# Patient Record
Sex: FEMALE | Race: WHITE | NOT HISPANIC OR LATINO | Employment: OTHER | URBAN - METROPOLITAN AREA
[De-identification: names, ages, dates, MRNs, and addresses within clinical notes are randomized per-mention and may not be internally consistent; named-entity substitution may affect disease eponyms.]

---

## 2020-02-01 ENCOUNTER — HOSPITAL ENCOUNTER (EMERGENCY)
Facility: HOSPITAL | Age: 85
Discharge: HOME | End: 2020-02-01
Attending: EMERGENCY MEDICINE
Payer: COMMERCIAL

## 2020-02-01 ENCOUNTER — APPOINTMENT (EMERGENCY)
Dept: RADIOLOGY | Facility: HOSPITAL | Age: 85
End: 2020-02-01
Payer: COMMERCIAL

## 2020-02-01 VITALS
BODY MASS INDEX: 19.14 KG/M2 | HEART RATE: 81 BPM | RESPIRATION RATE: 24 BRPM | DIASTOLIC BLOOD PRESSURE: 65 MMHG | SYSTOLIC BLOOD PRESSURE: 145 MMHG | WEIGHT: 108 LBS | HEIGHT: 63 IN | OXYGEN SATURATION: 98 %

## 2020-02-01 DIAGNOSIS — R07.89 ATYPICAL CHEST PAIN: Primary | ICD-10-CM

## 2020-02-01 LAB
ALBUMIN SERPL BCP-MCNC: 3.6 G/DL (ref 3.5–5)
ALP SERPL-CCNC: 58 U/L (ref 46–116)
ALT SERPL W P-5'-P-CCNC: 28 U/L (ref 12–78)
ANION GAP SERPL CALCULATED.3IONS-SCNC: 9 MMOL/L (ref 4–13)
AST SERPL W P-5'-P-CCNC: 24 U/L (ref 5–45)
ATRIAL RATE: 78 BPM
BASOPHILS # BLD AUTO: 0.04 THOUSANDS/ΜL (ref 0–0.1)
BASOPHILS NFR BLD AUTO: 1 % (ref 0–1)
BILIRUB SERPL-MCNC: 0.4 MG/DL (ref 0.2–1)
BUN SERPL-MCNC: 31 MG/DL (ref 5–25)
CALCIUM SERPL-MCNC: 8.8 MG/DL (ref 8.3–10.1)
CHLORIDE SERPL-SCNC: 107 MMOL/L (ref 100–108)
CO2 SERPL-SCNC: 27 MMOL/L (ref 21–32)
CREAT SERPL-MCNC: 0.88 MG/DL (ref 0.6–1.3)
EOSINOPHIL # BLD AUTO: 0.08 THOUSAND/ΜL (ref 0–0.61)
EOSINOPHIL NFR BLD AUTO: 1 % (ref 0–6)
ERYTHROCYTE [DISTWIDTH] IN BLOOD BY AUTOMATED COUNT: 12.9 % (ref 11.6–15.1)
GFR SERPL CREATININE-BSD FRML MDRD: 60 ML/MIN/1.73SQ M
GLUCOSE SERPL-MCNC: 105 MG/DL (ref 65–140)
HCT VFR BLD AUTO: 38.7 % (ref 34.8–46.1)
HGB BLD-MCNC: 12.5 G/DL (ref 11.5–15.4)
IMM GRANULOCYTES # BLD AUTO: 0.03 THOUSAND/UL (ref 0–0.2)
IMM GRANULOCYTES NFR BLD AUTO: 0 % (ref 0–2)
LYMPHOCYTES # BLD AUTO: 2.17 THOUSANDS/ΜL (ref 0.6–4.47)
LYMPHOCYTES NFR BLD AUTO: 28 % (ref 14–44)
MCH RBC QN AUTO: 30.9 PG (ref 26.8–34.3)
MCHC RBC AUTO-ENTMCNC: 32.3 G/DL (ref 31.4–37.4)
MCV RBC AUTO: 96 FL (ref 82–98)
MONOCYTES # BLD AUTO: 0.69 THOUSAND/ΜL (ref 0.17–1.22)
MONOCYTES NFR BLD AUTO: 9 % (ref 4–12)
NEUTROPHILS # BLD AUTO: 4.73 THOUSANDS/ΜL (ref 1.85–7.62)
NEUTS SEG NFR BLD AUTO: 61 % (ref 43–75)
NRBC BLD AUTO-RTO: 0 /100 WBCS
P AXIS: 90 DEGREES
PLATELET # BLD AUTO: 190 THOUSANDS/UL (ref 149–390)
PMV BLD AUTO: 8.9 FL (ref 8.9–12.7)
POTASSIUM SERPL-SCNC: 4.2 MMOL/L (ref 3.5–5.3)
PR INTERVAL: 224 MS
PROT SERPL-MCNC: 7 G/DL (ref 6.4–8.2)
QRS AXIS: 87 DEGREES
QRSD INTERVAL: 126 MS
QT INTERVAL: 408 MS
QTC INTERVAL: 465 MS
RBC # BLD AUTO: 4.05 MILLION/UL (ref 3.81–5.12)
SODIUM SERPL-SCNC: 143 MMOL/L (ref 136–145)
T WAVE AXIS: 75 DEGREES
TROPONIN I SERPL-MCNC: <0.02 NG/ML
VENTRICULAR RATE: 78 BPM
WBC # BLD AUTO: 7.74 THOUSAND/UL (ref 4.31–10.16)

## 2020-02-01 PROCEDURE — 93010 ELECTROCARDIOGRAM REPORT: CPT | Performed by: INTERNAL MEDICINE

## 2020-02-01 PROCEDURE — 99285 EMERGENCY DEPT VISIT HI MDM: CPT

## 2020-02-01 PROCEDURE — 93005 ELECTROCARDIOGRAM TRACING: CPT

## 2020-02-01 PROCEDURE — 84484 ASSAY OF TROPONIN QUANT: CPT | Performed by: PHYSICIAN ASSISTANT

## 2020-02-01 PROCEDURE — 71046 X-RAY EXAM CHEST 2 VIEWS: CPT

## 2020-02-01 PROCEDURE — 36415 COLL VENOUS BLD VENIPUNCTURE: CPT | Performed by: PHYSICIAN ASSISTANT

## 2020-02-01 PROCEDURE — 85025 COMPLETE CBC W/AUTO DIFF WBC: CPT | Performed by: PHYSICIAN ASSISTANT

## 2020-02-01 PROCEDURE — 99285 EMERGENCY DEPT VISIT HI MDM: CPT | Performed by: PHYSICIAN ASSISTANT

## 2020-02-01 PROCEDURE — 80053 COMPREHEN METABOLIC PANEL: CPT | Performed by: PHYSICIAN ASSISTANT

## 2020-02-01 RX ORDER — PRIMIDONE 50 MG/1
TABLET ORAL 4 TIMES DAILY
COMMUNITY

## 2020-02-01 RX ORDER — TEMAZEPAM 15 MG/1
15 CAPSULE ORAL
COMMUNITY

## 2020-02-01 RX ORDER — ASPIRIN 81 MG/1
324 TABLET, CHEWABLE ORAL ONCE
Status: COMPLETED | OUTPATIENT
Start: 2020-02-01 | End: 2020-02-01

## 2020-02-01 RX ORDER — ASPIRIN 325 MG
325 TABLET ORAL ONCE
Status: DISCONTINUED | OUTPATIENT
Start: 2020-02-01 | End: 2020-02-01

## 2020-02-01 RX ORDER — LEVOTHYROXINE SODIUM 0.03 MG/1
25 TABLET ORAL DAILY
COMMUNITY

## 2020-02-01 RX ADMIN — ASPIRIN 81 MG 324 MG: 81 TABLET ORAL at 09:40

## 2020-02-01 NOTE — ED NOTES
Pt verbalized understanding of d/c instructions   Ambulated out of ED with daughter towards waiting room      Balwinder Reynolds RN  02/01/20 9037

## 2020-02-01 NOTE — ED PROVIDER NOTES
History  Chief Complaint   Patient presents with    Chest Pain     pt presents to ED c/o chest tightness, right shoulder, and back pain she rates 10/10  Also c/o SOB upon exertion and being unable to catch her breath   Shortness of Breath     17-year-old female with history of hypothyroidism presents emergency department for evaluation chest tightness and shortness of breath beginning last night  The patient is somewhat tangential in vague about the details, at 1 point indicates that she has had this pain for several days, and another point indicating the pain began this morning  She also relays a history of chronic right shoulder and neck pain after a  Shoulder joint replacement x 5 years  She is difficult to redirect regards to discussing her right shoulder pain  Apparently she called her daughter this morning and indicated that she was having left-sided chest tightness radiating to the left arm as well as difficulty taking a deep breath  Symptoms have not worsened since onset  She denies associated fevers, chills, sweats, new neck pain nausea, vomiting, or abdominal pain  She has no history of hypertension or hyperlipidemia, and denies any known family history of cardiovascular disease  Prior to Admission Medications   Prescriptions Last Dose Informant Patient Reported? Taking? Multiple Vitamins-Minerals (CENTRUM SILVER PO)   Yes Yes   Sig: Take by mouth   b complex vitamins tablet   Yes Yes   Sig: Take 1 tablet by mouth daily   levothyroxine 25 mcg tablet   Yes Yes   Sig: Take 25 mcg by mouth daily   primidone (MYSOLINE) 50 mg tablet   Yes Yes   Sig: Take by mouth 4 (four) times a day   temazepam (RESTORIL) 15 mg capsule   Yes Yes   Sig: Take 15 mg by mouth daily at bedtime as needed for sleep      Facility-Administered Medications: None       Past Medical History:   Diagnosis Date    Anxiety     Disease of thyroid gland        History reviewed  No pertinent surgical history      History reviewed  No pertinent family history  I have reviewed and agree with the history as documented  Social History     Tobacco Use    Smoking status: Never Smoker    Smokeless tobacco: Never Used   Substance Use Topics    Alcohol use: Never     Frequency: Never    Drug use: Never        Review of Systems   Constitutional: Negative for chills, diaphoresis and fever  Eyes: Negative for visual disturbance  Respiratory: Positive for chest tightness and shortness of breath  Negative for cough  Cardiovascular: Negative for chest pain, palpitations and leg swelling  Gastrointestinal: Negative for abdominal pain, diarrhea, nausea and vomiting  Genitourinary: Negative for dysuria, flank pain and frequency  Musculoskeletal: Negative for arthralgias and myalgias  Skin: Negative for color change, rash and wound  Allergic/Immunologic: Negative for immunocompromised state  Neurological: Negative for dizziness and light-headedness  Hematological: Does not bruise/bleed easily  Psychiatric/Behavioral: Negative for confusion  The patient is not nervous/anxious  Physical Exam  Physical Exam   Constitutional: She is oriented to person, place, and time  She appears well-developed and well-nourished  No distress  HENT:   Head: Normocephalic and atraumatic  Mouth/Throat: Oropharynx is clear and moist    Eyes: Pupils are equal, round, and reactive to light  No scleral icterus  Neck: No JVD present  Cardiovascular: Normal rate and regular rhythm  Exam reveals no gallop and no friction rub  No murmur heard  Pulses:       Radial pulses are 2+ on the right side, and 2+ on the left side  Pulmonary/Chest: No respiratory distress  She has no wheezes  She has no rhonchi  She has no rales  No reproducible chest wall tenderness to palpation, no bony deformities or ecchymosis   Abdominal: Soft  Bowel sounds are normal  She exhibits no distension and no mass  There is no tenderness   There is no rebound and no guarding  Musculoskeletal: She exhibits no edema  Right lower leg: She exhibits no edema  Left lower leg: She exhibits no edema  Limited range of motion of the right shoulder, reportedly chronic  Range of motion of the left shoulder has no affect on her chest tightness or shortness of breath   Neurological: She is alert and oriented to person, place, and time  Skin: Skin is warm and dry  Capillary refill takes less than 2 seconds  She is not diaphoretic  No pallor  Psychiatric: She has a normal mood and affect  Her behavior is normal    Vitals reviewed        Vital Signs  ED Triage Vitals   Temp Pulse Respirations Blood Pressure SpO2   -- 02/01/20 0934 02/01/20 0934 02/01/20 0934 02/01/20 0934    81 (!) 24 145/65 98 %      Temp src Heart Rate Source Patient Position - Orthostatic VS BP Location FiO2 (%)   -- 02/01/20 0934 -- -- --    Monitor         Pain Score       02/01/20 1016       6           Vitals:    02/01/20 0934   BP: 145/65   Pulse: 81         Visual Acuity  Visual Acuity      Most Recent Value   L Pupil Size (mm)  3   R Pupil Size (mm)  3          ED Medications  Medications   aspirin chewable tablet 324 mg (324 mg Oral Given 2/1/20 0940)       Diagnostic Studies  Results Reviewed     Procedure Component Value Units Date/Time    Troponin I [[id number]]  (Normal) Collected:  02/01/20 0934    Lab Status:  Final result Specimen:  Blood from Arm, Left Updated:  02/01/20 1002     Troponin I <0 02 ng/mL     Comprehensive metabolic panel [[id number]]  (Abnormal) Collected:  02/01/20 0934    Lab Status:  Final result Specimen:  Blood from Arm, Left Updated:  02/01/20 1000     Sodium 143 mmol/L      Potassium 4 2 mmol/L      Chloride 107 mmol/L      CO2 27 mmol/L      ANION GAP 9 mmol/L      BUN 31 mg/dL      Creatinine 0 88 mg/dL      Glucose 105 mg/dL      Calcium 8 8 mg/dL      AST 24 U/L      ALT 28 U/L      Alkaline Phosphatase 58 U/L      Total Protein 7 0 g/dL      Albumin 3 6 g/dL      Total Bilirubin 0 40 mg/dL      eGFR 60 ml/min/1 73sq m     Narrative:       National Kidney Disease Foundation guidelines for Chronic Kidney Disease (CKD):     Stage 1 with normal or high GFR (GFR > 90 mL/min/1 73 square meters)    Stage 2 Mild CKD (GFR = 60-89 mL/min/1 73 square meters)    Stage 3A Moderate CKD (GFR = 45-59 mL/min/1 73 square meters)    Stage 3B Moderate CKD (GFR = 30-44 mL/min/1 73 square meters)    Stage 4 Severe CKD (GFR = 15-29 mL/min/1 73 square meters)    Stage 5 End Stage CKD (GFR <15 mL/min/1 73 square meters)  Note: GFR calculation is accurate only with a steady state creatinine    CBC and differential [[id number]] Collected:  02/01/20 0934    Lab Status:  Final result Specimen:  Blood from Arm, Left Updated:  02/01/20 0942     WBC 7 74 Thousand/uL      RBC 4 05 Million/uL      Hemoglobin 12 5 g/dL      Hematocrit 38 7 %      MCV 96 fL      MCH 30 9 pg      MCHC 32 3 g/dL      RDW 12 9 %      MPV 8 9 fL      Platelets 587 Thousands/uL      nRBC 0 /100 WBCs      Neutrophils Relative 61 %      Immat GRANS % 0 %      Lymphocytes Relative 28 %      Monocytes Relative 9 %      Eosinophils Relative 1 %      Basophils Relative 1 %      Neutrophils Absolute 4 73 Thousands/µL      Immature Grans Absolute 0 03 Thousand/uL      Lymphocytes Absolute 2 17 Thousands/µL      Monocytes Absolute 0 69 Thousand/µL      Eosinophils Absolute 0 08 Thousand/µL      Basophils Absolute 0 04 Thousands/µL                  XR chest 2 views   ED Interpretation by Fang Posey PA-C (02/01 1004)   No acute cardiopulmonary disease                 Procedures  ECG 12 Lead Documentation Only  Date/Time: 2/1/2020 9:40 AM  Performed by: Fang Posey PA-C  Authorized by: Fang Posey PA-C     Indications / Diagnosis:  Chest tightness  ECG reviewed by me, the ED Provider: yes    Patient location:  ED  Previous ECG:     Previous ECG:  Unavailable  Interpretation:     Interpretation: non-specific    Rate:     ECG rate:  78    ECG rate assessment: normal    Rhythm:     Rhythm: sinus rhythm    Ectopy:     Ectopy: none    QRS:     QRS axis:  Normal    QRS intervals: Wide  Conduction:     Conduction: abnormal      Abnormal conduction: complete RBBB and 1st degree    ST segments:     ST segments:  Normal  T waves:     T waves: normal               ED Course         HEART Risk Score      Most Recent Value   History  1 Filed at: 02/01/2020 1004   ECG  0 Filed at: 02/01/2020 1004   Age  2 Filed at: 02/01/2020 1004   Risk Factors  0 Filed at: 02/01/2020 1004   Troponin  0 Filed at: 02/01/2020 1004   Heart Score Risk Calculator   History  1 Filed at: 02/01/2020 1004   ECG  0 Filed at: 02/01/2020 1004   Age  2 Filed at: 02/01/2020 1004   Risk Factors  0 Filed at: 02/01/2020 1004   Troponin  0 Filed at: 02/01/2020 1004   HEART Score  3 Filed at: 02/01/2020 1004   HEART Score  3 Filed at: 02/01/2020 1004                            MDM  Number of Diagnoses or Management Options  Atypical chest pain: new and requires workup  Diagnosis management comments: Labs are reassuring an EKG is nonischemic  The patient's symptoms are quite vague and sound primarily mechanical in nature  She actually has no significant risk factors for coronary artery disease, with exception of her age  She remained stable throughout emergency department stay    Will discharge to outpatient stress test for further quantification of this pain       Amount and/or Complexity of Data Reviewed  Clinical lab tests: ordered and reviewed  Tests in the radiology section of CPT®: ordered and reviewed  Tests in the medicine section of CPT®: ordered and reviewed  Review and summarize past medical records: yes  Independent visualization of images, tracings, or specimens: yes          Disposition  Final diagnoses:   Atypical chest pain     Time reflects when diagnosis was documented in both MDM as applicable and the Disposition within this note     Time User Action Codes Description Comment    2/1/2020 10:11 AM Miguelina Hull Add [R07 [id number]] Atypical chest pain       ED Disposition     ED Disposition Condition Date/Time Comment    Discharge Stable Sat Feb 1, 2020 10:11 AM Elle discharge to home/self care  Follow-up Information     Follow up With Specialties Details Why Contact Info    Nichole Fox MD Family Medicine In 3 days for ER follow up [address]  [address]            Patient's Medications   Discharge Prescriptions    No medications on file     Outpatient Discharge Orders   Echo stress test w contrast if indicated   Standing Status: Future Standing Exp   Date: 02/01/24       ED Provider  Electronically Signed by           Erica Huerta PA-C  02/01/20 1017

## 2020-02-03 ENCOUNTER — TRANSCRIBE ORDERS (OUTPATIENT)
Dept: ADMINISTRATIVE | Facility: HOSPITAL | Age: 85
End: 2020-02-03

## 2020-02-03 DIAGNOSIS — R07.9 CHEST PAIN, UNSPECIFIED TYPE: Primary | ICD-10-CM

## 2020-02-04 ENCOUNTER — HOSPITAL ENCOUNTER (OUTPATIENT)
Dept: NON INVASIVE DIAGNOSTICS | Age: 85
Discharge: HOME | End: 2020-02-04

## 2020-02-04 DIAGNOSIS — R07.9 CHEST PAIN, UNSPECIFIED TYPE: ICD-10-CM

## 2020-02-07 ENCOUNTER — HOSPITAL ENCOUNTER (OUTPATIENT)
Dept: NON INVASIVE DIAGNOSTICS | Age: 85
Discharge: HOME | End: 2020-02-07
Payer: COMMERCIAL

## 2020-02-07 ENCOUNTER — HOSPITAL ENCOUNTER (OUTPATIENT)
Dept: NON INVASIVE DIAGNOSTICS | Age: 85
End: 2020-02-07
Payer: COMMERCIAL

## 2020-02-07 LAB
CHEST PAIN STATEMENT: NORMAL
MAX DIASTOLIC BP: 70 MMHG
MAX HEART RATE: 85 BPM
MAX PREDICTED HEART RATE: 135 BPM
MAX. SYSTOLIC BP: 100 MMHG
PROTOCOL NAME: NORMAL
REASON FOR TERMINATION: NORMAL
TARGET HR FORMULA: NORMAL
TEST INDICATION: NORMAL
TIME IN EXERCISE PHASE: NORMAL

## 2020-02-07 PROCEDURE — 93017 CV STRESS TEST TRACING ONLY: CPT

## 2020-02-07 PROCEDURE — 78452 HT MUSCLE IMAGE SPECT MULT: CPT

## 2020-02-07 PROCEDURE — 78452 HT MUSCLE IMAGE SPECT MULT: CPT | Performed by: INTERNAL MEDICINE

## 2020-02-07 PROCEDURE — 93016 CV STRESS TEST SUPVJ ONLY: CPT | Performed by: INTERNAL MEDICINE

## 2020-02-07 PROCEDURE — A9502 TC99M TETROFOSMIN: HCPCS

## 2020-02-07 PROCEDURE — 93018 CV STRESS TEST I&R ONLY: CPT | Performed by: INTERNAL MEDICINE

## 2020-02-07 RX ADMIN — REGADENOSON 0.4 MG: 0.08 INJECTION, SOLUTION INTRAVENOUS at 10:47

## 2021-03-18 ENCOUNTER — TELEPHONE (OUTPATIENT)
Dept: FAMILY MEDICINE CLINIC | Facility: CLINIC | Age: 86
End: 2021-03-18

## 2021-03-18 ENCOUNTER — HOSPITAL ENCOUNTER (EMERGENCY)
Facility: HOSPITAL | Age: 86
Discharge: HOME | End: 2021-03-18
Attending: EMERGENCY MEDICINE | Admitting: EMERGENCY MEDICINE
Payer: COMMERCIAL

## 2021-03-18 ENCOUNTER — APPOINTMENT (EMERGENCY)
Dept: RADIOLOGY | Facility: HOSPITAL | Age: 86
End: 2021-03-18
Attending: EMERGENCY MEDICINE
Payer: COMMERCIAL

## 2021-03-18 ENCOUNTER — TELEMEDICINE (OUTPATIENT)
Dept: INTERNAL MEDICINE CLINIC | Facility: CLINIC | Age: 86
End: 2021-03-18
Payer: COMMERCIAL

## 2021-03-18 VITALS
BODY MASS INDEX: 18.97 KG/M2 | WEIGHT: 103.06 LBS | OXYGEN SATURATION: 99 % | HEART RATE: 84 BPM | RESPIRATION RATE: 18 BRPM | HEIGHT: 62 IN | TEMPERATURE: 97.8 F | DIASTOLIC BLOOD PRESSURE: 65 MMHG | SYSTOLIC BLOOD PRESSURE: 141 MMHG

## 2021-03-18 DIAGNOSIS — R55 NEAR SYNCOPE: ICD-10-CM

## 2021-03-18 DIAGNOSIS — U07.1 COVID-19: Primary | ICD-10-CM

## 2021-03-18 DIAGNOSIS — U07.1 COVID-19 VIRUS INFECTION: Primary | ICD-10-CM

## 2021-03-18 LAB
ANION GAP SERPL CALCULATED.3IONS-SCNC: 10 MMOL/L (ref 4–13)
BASOPHILS # BLD AUTO: 0.04 THOUSANDS/ΜL (ref 0–0.1)
BASOPHILS NFR BLD AUTO: 1 % (ref 0–1)
BUN SERPL-MCNC: 21 MG/DL (ref 5–25)
CALCIUM SERPL-MCNC: 9 MG/DL (ref 8.3–10.1)
CHLORIDE SERPL-SCNC: 106 MMOL/L (ref 100–108)
CO2 SERPL-SCNC: 26 MMOL/L (ref 21–32)
CREAT SERPL-MCNC: 0.9 MG/DL (ref 0.6–1.3)
EOSINOPHIL # BLD AUTO: 0.02 THOUSAND/ΜL (ref 0–0.61)
EOSINOPHIL NFR BLD AUTO: 1 % (ref 0–6)
ERYTHROCYTE [DISTWIDTH] IN BLOOD BY AUTOMATED COUNT: 13.2 % (ref 11.6–15.1)
FLUAV RNA RESP QL NAA+PROBE: NEGATIVE
FLUBV RNA RESP QL NAA+PROBE: NEGATIVE
GFR SERPL CREATININE-BSD FRML MDRD: 58 ML/MIN/1.73SQ M
GLUCOSE SERPL-MCNC: 85 MG/DL (ref 65–140)
HCT VFR BLD AUTO: 42 % (ref 34.8–46.1)
HGB BLD-MCNC: 13.6 G/DL (ref 11.5–15.4)
IMM GRANULOCYTES # BLD AUTO: 0.01 THOUSAND/UL (ref 0–0.2)
IMM GRANULOCYTES NFR BLD AUTO: 0 % (ref 0–2)
LYMPHOCYTES # BLD AUTO: 1.33 THOUSANDS/ΜL (ref 0.6–4.47)
LYMPHOCYTES NFR BLD AUTO: 32 % (ref 14–44)
MCH RBC QN AUTO: 31.4 PG (ref 26.8–34.3)
MCHC RBC AUTO-ENTMCNC: 32.4 G/DL (ref 31.4–37.4)
MCV RBC AUTO: 97 FL (ref 82–98)
MONOCYTES # BLD AUTO: 0.87 THOUSAND/ΜL (ref 0.17–1.22)
MONOCYTES NFR BLD AUTO: 21 % (ref 4–12)
NEUTROPHILS # BLD AUTO: 1.9 THOUSANDS/ΜL (ref 1.85–7.62)
NEUTS SEG NFR BLD AUTO: 45 % (ref 43–75)
NRBC BLD AUTO-RTO: 0 /100 WBCS
NT-PROBNP SERPL-MCNC: 331 PG/ML
PLATELET # BLD AUTO: 169 THOUSANDS/UL (ref 149–390)
PMV BLD AUTO: 8.6 FL (ref 8.9–12.7)
POTASSIUM SERPL-SCNC: 4 MMOL/L (ref 3.5–5.3)
RBC # BLD AUTO: 4.33 MILLION/UL (ref 3.81–5.12)
RSV RNA RESP QL NAA+PROBE: NEGATIVE
SARS-COV-2 RNA RESP QL NAA+PROBE: POSITIVE
SODIUM SERPL-SCNC: 142 MMOL/L (ref 136–145)
TROPONIN I SERPL-MCNC: <0.02 NG/ML
WBC # BLD AUTO: 4.17 THOUSAND/UL (ref 4.31–10.16)

## 2021-03-18 PROCEDURE — 1036F TOBACCO NON-USER: CPT | Performed by: INTERNAL MEDICINE

## 2021-03-18 PROCEDURE — 1160F RVW MEDS BY RX/DR IN RCRD: CPT | Performed by: INTERNAL MEDICINE

## 2021-03-18 PROCEDURE — 80048 BASIC METABOLIC PNL TOTAL CA: CPT | Performed by: EMERGENCY MEDICINE

## 2021-03-18 PROCEDURE — 0241U HB NFCT DS VIR RESP RNA 4 TRGT: CPT | Performed by: EMERGENCY MEDICINE

## 2021-03-18 PROCEDURE — 36415 COLL VENOUS BLD VENIPUNCTURE: CPT | Performed by: EMERGENCY MEDICINE

## 2021-03-18 PROCEDURE — 83880 ASSAY OF NATRIURETIC PEPTIDE: CPT | Performed by: EMERGENCY MEDICINE

## 2021-03-18 PROCEDURE — 99284 EMERGENCY DEPT VISIT MOD MDM: CPT | Performed by: EMERGENCY MEDICINE

## 2021-03-18 PROCEDURE — 84484 ASSAY OF TROPONIN QUANT: CPT | Performed by: EMERGENCY MEDICINE

## 2021-03-18 PROCEDURE — 99213 OFFICE O/P EST LOW 20 MIN: CPT | Performed by: INTERNAL MEDICINE

## 2021-03-18 PROCEDURE — 85025 COMPLETE CBC W/AUTO DIFF WBC: CPT | Performed by: EMERGENCY MEDICINE

## 2021-03-18 PROCEDURE — 99284 EMERGENCY DEPT VISIT MOD MDM: CPT

## 2021-03-18 PROCEDURE — 71045 X-RAY EXAM CHEST 1 VIEW: CPT

## 2021-03-18 PROCEDURE — 93005 ELECTROCARDIOGRAM TRACING: CPT

## 2021-03-18 NOTE — PROGRESS NOTES
COVID-19 Virtual Visit     Assessment/Plan:    Problem List Items Addressed This Visit     None      Visit Diagnoses     COVID-19 virus infection    -  Primary         Disposition:     I recommended continued isolation until at least 24 hours have passed since recovery defined as resolution of fever without the use of fever-reducing medications AND improvement in COVID symptoms AND 10 days have passed since onset of symptoms (or 10 days have passed since date of first positive viral diagnostic test for asymptomatic patients)  Samaria Stokes is stable and able to manage at home at this time per donna and RINA  We discussed monoclonal ab therapy and a fax was sent to RINA email address on file with information on the Regeneron product and regarding [facility] infusion center  The daughter and RINA would like to think about the treatment and get back to me by tomorrow afternoon  They are aware that Aide's symptoms could get worse and that there is not way to tell at this time who has more severe symptoms and who does not get worse  They are aware of the 7 day window for treatment with this infusion therapy  All questions answered/addressed  Patient meets criteria for Casirivimab/Imdevimab infusion  They were counseled in regards to risks, benefits, and side effects of this infusion  Casirivimab and imdevimab are investigational medicines used to treat mild to moderate symptoms of COVID-19 in non-hospitalized adults and adolescents (15years of age and older who weigh at least 80 pounds (40 kg)), and who are at high risk for developing severe COVID-19 symptoms or the need for hospitalization  Casirivimab and imdevimab are investigational because they are still being studied  There is limited information known about the safety and effectiveness of using casirivimab and imdevimab to treat people with COVID-19       The FDA has authorized the emergency use of casirivimab and imdevimab for the treatment of COVID-19 under an Emergency Use Authorization (EUA)  Possible side effects of casirivimab and imdevimab: Allergic reactions can happen during and after infusion with casirivimab and imdevimab  Possible reactions include: fever, chills, nausea, headache, shortness of breath, low blood pressure, wheezing, swelling of your lips, face, or throat, rash including hives, itching, muscle aches, and dizziness  The side effects of getting any medicine by vein may include brief pain, bleeding, bruising of the skin, soreness, swelling, and possible infection at the infusion site  These are not all the possible side effects of casirivimab and imdevimab  Not a lot of people have been given casirivimab and imdevimab  Serious and unexpected side effects may happen  Casirivimab and imdevimab are still being studied so it is possible that all of the risks are not known at this time  It is possible that casirivimab and imdevimab could interfere with your body's own ability to fight off a future infection of SARS-CoV-2  Similarly, casirivimab and imdevimab may reduce your body's immune response to a vaccine for SARS-CoV-2  Specific studies have not been conducted to address these possible risks  Emergency Use Authorization:    The [facility] FDA has made casirivimab and imdevimab available under an emergency access mechanism called an EUA  The EUA is supported by a  of Health and Human Service (HHS) declaration that circumstances exist to justify the emergency use of drugs and biological products during the COVID-19 pandemic  Casirivimab and imdevimab have not undergone the same type of review as an FDA-approved or cleared product  The FDA may issue an EUA when certain criteria are met, which includes that there are no adequate, approved, available alternatives   In addition, the FDA decision is based on the totality of scientific evidence available showing that it is reasonable to believe that the product meets certain criteria for safety, performance, and labeling and may be effective in treatment of patients during the COVID-19 pandemic  All of these criteria must be met to allow for the product to be used in the treatment of patients during the COVID-19 pandemic  The EUA for casirivimab and imdevimab is in effect for the duration of the COVID-19 declaration justifying emergency use of these products, unless terminated or revoked (after which the products may no longer be used)  Regarding COVID-19 Vaccination:    Currently there is no data or safety or efficacy of COVID-19 vaccination in persons who received monoclonal antibodies as part of COVID-19 treatment  Treatment should be deferred for at least 90 days to avoid interference of the treatment with vaccine-induced immune responses (this is based on estimated half-life of therapies and evidence suggesting reinfection is uncommon within 90 days of initial infection)  The patient consents to proceed with casirivimab and imdevimab infusion  I have spent 20 minutes directly with the patient  Greater than 50% of this time was spent in counseling/coordination of care regarding: prognosis, risks and benefits of treatment options, instructions for management, patient and family education and impressions  Encounter provider Lonnie Valenzuela DO    Provider located at [address] [place] [address]  Via [address]  [phone]    Recent Visits  No visits were found meeting these conditions  Showing recent visits within past 7 days and meeting all other requirements     Today's Visits  Date Type Provider Dept   03/18/21 Telemedicine Tristan Romero [facility]   Showing today's visits and meeting all other requirements     Future Appointments  No visits were found meeting these conditions     Showing future appointments within next 150 days and meeting all other requirements      This virtual check-in was done via [facility] and patient was informed that this is a secure, HIPAA-compliant platform  She agrees to proceed  Patient agrees to participate in a virtual check in via telephone or video visit instead of presenting to the office to address urgent/immediate medical needs  Patient is aware this is a billable service  After connecting through [facility], the patient was identified by name and date of birth  Ozzie Scott was informed that this was a telemedicine visit and that the exam was being conducted confidentially over secure lines  My office door was closed  No one else was in the room  Ozzie Scott acknowledged consent and understanding of privacy and security of the telemedicine visit  I informed the patient that I have reviewed her record in Epic and presented the opportunity for her to ask any questions regarding the visit today  The patient agreed to participate  Subjective:   Ozzie Scott is a 80 y o  female who has been screened for COVID-19  Symptom change since last report: unchanged  Patient's symptoms include fatigue, cough and headache  Patient denies fever, shortness of breath and diarrhea  Emerald Azevedo has been staying home and has isolated themselves in her home  She is taking care to not share personal items and is cleaning all surfaces that are touched often, like counters, tabletops, and doorknobs using household cleaning sprays or wipes  She is wearing a mask when she leaves her room  Date of symptom onset: 3/16/2021  Date of positive COVID-19 PCR: 3/18/2021    Emerald Azevedo is part of COVID-19  program(PCP out of network) and is COVID-19 PCR positive from today, interested in monoclonal ab therapy  Discussion over MS Teams occurred with daugther and son-in-law regarding this therapy  They went to see Emerald Azevedo yesterday and she c/o dizziness and low BP(which she has chronically) and a slight cough/headache  She was feeling tired/fatigue as well  They took her to the ER and she was dx'd as COVID-19 PCR positive and discharged to home  Rebecca Mattehws lives on her own and daughter/RINA live nearby and keep a close monitor on her  Daughter and RINA just rec'd their 2nd vaccine dose last week  They were advised to contact their PCP due to COVID-19 exposure & not 2 weeks post 2nd vaccine dose  We discussed monoclonal ab therapy(Regeneron) and that it is not FDA approved but used under emergency use authorization by the FDA  We discussed possible side effects of monoclonal ab therapy and its risks/benefits, including that there may be adverse effects from the monoclonal ab therapy that are not known & could occur  Rebecca Matthews is doing well at this point per family and stable at home   she is in isolation at this time per CDC guidelines  ROS Otherwise negative, no other complaints  Lab Results   Component Value Date    SARSCOV2 Positive (A) 03/18/2021     Past Medical History:   Diagnosis Date    Anxiety     Disease of thyroid gland      History reviewed  No pertinent surgical history  Current Outpatient Medications   Medication Sig Dispense Refill    b complex vitamins tablet Take 1 tablet by mouth daily      levothyroxine 25 mcg tablet Take 25 mcg by mouth daily      Multiple Vitamins-Minerals (CENTRUM SILVER PO) Take by mouth      primidone (MYSOLINE) 50 mg tablet Take by mouth 4 (four) times a day      temazepam (RESTORIL) 15 mg capsule Take 15 mg by mouth daily at bedtime as needed for sleep      Turmeric (QC TUMERIC COMPLEX PO) Take by mouth       No current facility-administered medications for this visit  No Known Allergies    Review of Systems   Constitutional: Positive for fatigue  Negative for fever  Respiratory: Positive for cough  Negative for shortness of breath  Gastrointestinal: Negative for diarrhea  Allergic/Immunologic: Negative for immunocompromised state     Neurological: Positive for dizziness (chronic, unchanged) and headaches  Objective: There were no vitals filed for this visit  Physical exam unable to be completed as discussion held with daughter and son-in-law on patient's behalf    VIRTUAL VISIT [facility] acknowledges that she has consented to an online visit or consultation  She understands that the online visit is based solely on information provided by her, and that, in the absence of a face-to-face physical evaluation by the physician, the diagnosis she receives is both limited and provisional in terms of accuracy and completeness  This is not intended to replace a full medical face-to-face evaluation by the physician  Karissaide understands and accepts these terms

## 2021-03-18 NOTE — ED PROVIDER NOTES
History  Chief Complaint   Patient presents with    Dizziness     Patient presents to the ER with report of being dizzy/light-headed when standing  66-year-old female with a history of low blood pressure presents for evaluation of lightheadedness that started yesterday  Patient denies associated chest pain or shortness of breath but states as soon she stands up, she started to feel lightheaded  Patient did not actually lose consciousness but symptoms became worse today which prompted her to present to the emergency department  Patient states she is asymptomatic as long as she is lying in the stretcher  Denies fever but states she started with a cough yesterday  Prior to Admission Medications   Prescriptions Last Dose Informant Patient Reported? Taking? Multiple Vitamins-Minerals (CENTRUM SILVER PO) 3/18/2021 at Unknown time  Yes Yes   Sig: Take by mouth   Turmeric (QC TUMERIC COMPLEX PO)   Yes Yes   Sig: Take by mouth   b complex vitamins tablet 3/18/2021 at Unknown time  Yes Yes   Sig: Take 1 tablet by mouth daily   levothyroxine 25 mcg tablet 3/18/2021 at Unknown time  Yes Yes   Sig: Take 25 mcg by mouth daily   primidone (MYSOLINE) 50 mg tablet 3/17/2021 at Unknown time  Yes Yes   Sig: Take by mouth 4 (four) times a day   temazepam (RESTORIL) 15 mg capsule Not Taking at Unknown time  Yes No   Sig: Take 15 mg by mouth daily at bedtime as needed for sleep      Facility-Administered Medications: None       Past Medical History:   Diagnosis Date    Anxiety     Disease of thyroid gland        History reviewed  No pertinent surgical history  History reviewed  No pertinent family history  I have reviewed and agree with the history as documented      E-Cigarette/Vaping     E-Cigarette/Vaping Substances     Social History     Tobacco Use    Smoking status: Never Smoker    Smokeless tobacco: Never Used   Substance Use Topics    Alcohol use: Never     Frequency: Never    Drug use: Never Review of Systems   Constitutional: Negative for fever  Respiratory: Positive for cough  Neurological: Positive for light-headedness  All other systems reviewed and are negative  Physical Exam  Physical Exam  Vitals signs and nursing note reviewed  Constitutional:       Appearance: She is well-developed  HENT:      Head: Normocephalic and atraumatic  Right Ear: External ear normal       Left Ear: External ear normal       Nose: Nose normal    Eyes:      General: No scleral icterus  Neck:      Musculoskeletal: Normal range of motion  Cardiovascular:      Rate and Rhythm: Normal rate  Pulmonary:      Effort: Pulmonary effort is normal  No respiratory distress  Abdominal:      General: There is no distension  Musculoskeletal: Normal range of motion  General: No deformity  Skin:     Findings: No rash  Neurological:      General: No focal deficit present  Mental Status: She is alert and oriented to person, place, and time     Psychiatric:         Mood and Affect: Mood normal          Vital Signs  ED Triage Vitals [03/18/21 1008]   Temperature Pulse Respirations Blood Pressure SpO2   97 8 °F (36 6 °C) 84 18 141/65 99 %      Temp Source Heart Rate Source Patient Position - Orthostatic VS BP Location FiO2 (%)   Oral Monitor Lying Left arm --      Pain Score       No Pain           Vitals:    03/18/21 1008   BP: 141/65   Pulse: 84   Patient Position - Orthostatic VS: Lying         Visual Acuity  Visual Acuity      Most Recent Value   R Pupil Size (mm)  3          ED Medications  Medications - No data to display    Diagnostic Studies  Results Reviewed     Procedure Component Value Units Date/Time    COVID19, Influenza A/B, RSV PCR, [facility] [[id number]]  (Abnormal) Collected: 03/18/21 1034    Lab Status: Final result Specimen: Nares from Nasopharyngeal Swab Updated: 03/18/21 1152     SARS-CoV-2 Positive     INFLUENZA A PCR Negative     INFLUENZA B PCR Negative     RSV PCR Negative    Narrative: This test has been authorized by FDA under an EUA (Emergency Use Assay) for use by authorized laboratories  Clinical caution and judgement should be used with the interpretation of these results with consideration of the clinical impression and other laboratory testing  Testing reported as "Positive" or "Negative" has been proven to be accurate according to standard laboratory validation requirements  All testing is performed with control materials showing appropriate reactivity at standard intervals      Basic metabolic panel [[id number]] Collected: 03/18/21 1032    Lab Status: Final result Specimen: Blood from Arm, Left Updated: 03/18/21 1111     Sodium 142 mmol/L      Potassium 4 0 mmol/L      Chloride 106 mmol/L      CO2 26 mmol/L      ANION GAP 10 mmol/L      BUN 21 mg/dL      Creatinine 0 90 mg/dL      Glucose 85 mg/dL      Calcium 9 0 mg/dL      eGFR 58 ml/min/1 73sq m     Narrative:      [place] guidelines for Chronic Kidney Disease (CKD):     Stage 1 with normal or high GFR (GFR > 90 mL/min/1 73 square meters)    Stage 2 Mild CKD (GFR = 60-89 mL/min/1 73 square meters)    Stage 3A Moderate CKD (GFR = 45-59 mL/min/1 73 square meters)    Stage 3B Moderate CKD (GFR = 30-44 mL/min/1 73 square meters)    Stage 4 Severe CKD (GFR = 15-29 mL/min/1 73 square meters)    Stage 5 End Stage CKD (GFR <15 mL/min/1 73 square meters)  Note: GFR calculation is accurate only with a steady state creatinine    NT-BNP PRO [[id number]]  (Normal) Collected: 03/18/21 1032    Lab Status: Final result Specimen: Blood from Arm, Left Updated: 03/18/21 1111     NT-proBNP 331 pg/mL     Troponin I [[id number]]  (Normal) Collected: 03/18/21 1032    Lab Status: Final result Specimen: Blood from Arm, Left Updated: 03/18/21 1108     Troponin I <0 02 ng/mL     CBC and differential [[id number]]  (Abnormal) Collected: 03/18/21 1032    Lab Status: Final result Specimen: Blood from Arm, Left Updated: 03/18/21 1046     WBC 4 17 Thousand/uL      RBC 4 33 Million/uL      Hemoglobin 13 6 g/dL      Hematocrit 42 0 %      MCV 97 fL      MCH 31 4 pg      MCHC 32 4 g/dL      RDW 13 2 %      MPV 8 6 fL      Platelets 341 Thousands/uL      nRBC 0 /100 WBCs      Neutrophils Relative 45 %      Immat GRANS % 0 %      Lymphocytes Relative 32 %      Monocytes Relative 21 %      Eosinophils Relative 1 %      Basophils Relative 1 %      Neutrophils Absolute 1 90 Thousands/µL      Immature Grans Absolute 0 01 Thousand/uL      Lymphocytes Absolute 1 33 Thousands/µL      Monocytes Absolute 0 87 Thousand/µL      Eosinophils Absolute 0 02 Thousand/µL      Basophils Absolute 0 04 Thousands/µL                  X-ray chest 1 view portable   Final Result by Jelly Dunbar MD (03/18 1040)      No acute cardiopulmonary disease  Workstation performed: [id number]                    Procedures  Procedures         ED Course                                           MDM  Number of Diagnoses or Management Options  COVID-19:   Near syncope:   Diagnosis management comments: 77-year-old female presenting with near syncope  Will obtain labs, EKG, chest x-ray and COVID testing  Discussed all results with patient  Sent message for monoclonal antibodys  Patient will follow up with PCP  RTED discussed  Disposition  Final diagnoses:   Near syncope   COVID-19     Time reflects when diagnosis was documented in both MDM as applicable and the Disposition within this note     Time User Action Codes Description Comment    3/18/2021 12:21 PM Geovanna Wolfe Add [R55] Near syncope     3/18/2021 12:21 PM Geovanna Wolfe Add [U07 1] COVID-19     3/18/2021 12:21 PM Geovanna Wolfe Modify [R55] Near syncope     3/18/2021 12:21  [address] [U07 1] COVID-19       ED Disposition     ED Disposition Condition Date/Time Comment    Discharge Stable Thu Mar 18, 2021 12:21 PM [facility] discharge to home/self care  Follow-up Information     Follow up With Specialties Details Why Contact Info Additional Information    Diamond Pendleton MD Family Medicine In 3 days  [address]        [address] Emergency Department Emergency Medicine  If symptoms worsen [address] [place], [address]  [phone] [address] Emergency Department, [address] [place], [place], [facility] [address]          Discharge Medication List as of 3/18/2021 12:21 PM      CONTINUE these medications which have NOT CHANGED    Details   b complex vitamins tablet Take 1 tablet by mouth daily, Historical Med      levothyroxine 25 mcg tablet Take 25 mcg by mouth daily, Historical Med      Multiple Vitamins-Minerals (CENTRUM SILVER PO) Take by mouth, Historical Med      primidone (MYSOLINE) 50 mg tablet Take by mouth 4 (four) times a day, Historical Med      temazepam (RESTORIL) 15 mg capsule Take 15 mg by mouth daily at bedtime as needed for sleep, Historical Med      Turmeric (QC TUMERIC COMPLEX PO) Take by mouth, Historical Med           No discharge procedures on file      PDMP Review     None          ED Provider  Electronically Signed by           Liliane Robertson DO  03/18/21 4445

## 2021-03-18 NOTE — TELEPHONE ENCOUNTER
----- Message from Kati Ramos DO sent at 3/18/2021 12:20 PM EDT -----  Regarding: High risk covid  Monoclonal ab candidate please advise

## 2021-03-19 LAB
ATRIAL RATE: 70 BPM
P AXIS: 86 DEGREES
PR INTERVAL: 216 MS
QRS AXIS: 81 DEGREES
QRSD INTERVAL: 126 MS
QT INTERVAL: 408 MS
QTC INTERVAL: 440 MS
T WAVE AXIS: 69 DEGREES
VENTRICULAR RATE: 70 BPM

## 2021-03-19 PROCEDURE — 93010 ELECTROCARDIOGRAM REPORT: CPT | Performed by: INTERNAL MEDICINE

## 2021-03-29 ENCOUNTER — TELEPHONE (OUTPATIENT)
Dept: INTERNAL MEDICINE CLINIC | Facility: CLINIC | Age: 86
End: 2021-03-29

## 2021-03-29 NOTE — TELEPHONE ENCOUNTER
----- Message from Loren Rich DO sent at 3/27/2021  1:00 PM EDT -----  Hi    Please contact patient's daughter    I spoke with family a little over a week ago about monoclonal ab therapy for Petra Oas as she is COVID-19 positive    I didn't hear back from them and want to make sure they/Aide are doing okay    Thank you

## 2021-04-14 ENCOUNTER — TRANSCRIBE ORDERS (OUTPATIENT)
Dept: ADMINISTRATIVE | Facility: HOSPITAL | Age: 86
End: 2021-04-14

## 2021-04-14 DIAGNOSIS — R42 DIZZINESS: Primary | ICD-10-CM

## 2021-04-14 DIAGNOSIS — H53.8 BLURRED VISION: ICD-10-CM

## 2021-04-20 ENCOUNTER — HOSPITAL ENCOUNTER (OUTPATIENT)
Dept: CT IMAGING | Facility: HOSPITAL | Age: 86
Discharge: HOME | End: 2021-04-20
Payer: COMMERCIAL

## 2021-04-20 DIAGNOSIS — R42 DIZZINESS: ICD-10-CM

## 2021-04-20 DIAGNOSIS — H53.8 BLURRED VISION: ICD-10-CM

## 2021-04-20 PROCEDURE — 70450 CT HEAD/BRAIN W/O DYE: CPT

## 2021-04-20 PROCEDURE — G1004 CDSM NDSC: HCPCS

## 2021-10-03 ENCOUNTER — HOSPITAL ENCOUNTER (EMERGENCY)
Facility: HOSPITAL | Age: 86
Discharge: HOME | End: 2021-10-03
Attending: EMERGENCY MEDICINE
Payer: COMMERCIAL

## 2021-10-03 ENCOUNTER — APPOINTMENT (EMERGENCY)
Dept: RADIOLOGY | Facility: HOSPITAL | Age: 86
End: 2021-10-03
Payer: COMMERCIAL

## 2021-10-03 VITALS
OXYGEN SATURATION: 97 % | RESPIRATION RATE: 18 BRPM | DIASTOLIC BLOOD PRESSURE: 79 MMHG | HEART RATE: 69 BPM | WEIGHT: 103 LBS | TEMPERATURE: 97.4 F | BODY MASS INDEX: 18.95 KG/M2 | SYSTOLIC BLOOD PRESSURE: 163 MMHG | HEIGHT: 62 IN

## 2021-10-03 DIAGNOSIS — S46.212A: Primary | ICD-10-CM

## 2021-10-03 PROCEDURE — 99284 EMERGENCY DEPT VISIT MOD MDM: CPT | Performed by: PHYSICIAN ASSISTANT

## 2021-10-03 PROCEDURE — 73030 X-RAY EXAM OF SHOULDER: CPT

## 2021-10-03 PROCEDURE — 73060 X-RAY EXAM OF HUMERUS: CPT

## 2021-10-03 PROCEDURE — 99283 EMERGENCY DEPT VISIT LOW MDM: CPT

## 2021-10-03 RX ORDER — ACETAMINOPHEN 325 MG/1
650 TABLET ORAL ONCE
Status: COMPLETED | OUTPATIENT
Start: 2021-10-03 | End: 2021-10-03

## 2021-10-03 RX ORDER — LIDOCAINE 50 MG/G
1 PATCH TOPICAL DAILY
Qty: 5 PATCH | Refills: 0 | Status: SHIPPED | OUTPATIENT
Start: 2021-10-03 | End: 2021-10-08

## 2021-10-03 RX ORDER — LIDOCAINE 50 MG/G
1 PATCH TOPICAL ONCE
Status: DISCONTINUED | OUTPATIENT
Start: 2021-10-03 | End: 2021-10-03 | Stop reason: HOSPADM

## 2021-10-03 RX ADMIN — ACETAMINOPHEN 650 MG: 325 TABLET, FILM COATED ORAL at 13:09

## 2021-10-03 RX ADMIN — LIDOCAINE 5% 1 PATCH: 700 PATCH TOPICAL at 14:11

## 2021-10-05 ENCOUNTER — OFFICE VISIT (OUTPATIENT)
Dept: OBGYN CLINIC | Facility: CLINIC | Age: 86
End: 2021-10-05
Payer: COMMERCIAL

## 2021-10-05 VITALS
BODY MASS INDEX: 18.95 KG/M2 | DIASTOLIC BLOOD PRESSURE: 70 MMHG | SYSTOLIC BLOOD PRESSURE: 118 MMHG | WEIGHT: 103 LBS | HEIGHT: 62 IN

## 2021-10-05 DIAGNOSIS — S46.212A RUPTURE OF LEFT PROXIMAL BICEPS TENDON, INITIAL ENCOUNTER: Primary | ICD-10-CM

## 2021-10-05 PROCEDURE — 99203 OFFICE O/P NEW LOW 30 MIN: CPT | Performed by: PHYSICIAN ASSISTANT

## 2021-10-05 PROCEDURE — 1160F RVW MEDS BY RX/DR IN RCRD: CPT | Performed by: PHYSICIAN ASSISTANT

## 2021-10-05 PROCEDURE — 1036F TOBACCO NON-USER: CPT | Performed by: PHYSICIAN ASSISTANT

## 2022-09-20 ENCOUNTER — APPOINTMENT (OUTPATIENT)
Dept: RADIOLOGY | Facility: HOSPITAL | Age: 87
End: 2022-09-20
Payer: COMMERCIAL

## 2022-09-20 ENCOUNTER — HOSPITAL ENCOUNTER (EMERGENCY)
Facility: HOSPITAL | Age: 87
Discharge: HOME | End: 2022-09-20
Attending: EMERGENCY MEDICINE
Payer: COMMERCIAL

## 2022-09-20 VITALS
OXYGEN SATURATION: 99 % | BODY MASS INDEX: 18.58 KG/M2 | RESPIRATION RATE: 19 BRPM | SYSTOLIC BLOOD PRESSURE: 108 MMHG | DIASTOLIC BLOOD PRESSURE: 56 MMHG | HEIGHT: 62 IN | TEMPERATURE: 97.9 F | HEART RATE: 61 BPM | WEIGHT: 101 LBS

## 2022-09-20 DIAGNOSIS — R42 LIGHTHEADEDNESS: Primary | ICD-10-CM

## 2022-09-20 LAB
ANION GAP SERPL CALCULATED.3IONS-SCNC: 9 MMOL/L (ref 4–13)
BASOPHILS # BLD AUTO: 0.05 THOUSANDS/ΜL (ref 0–0.1)
BASOPHILS NFR BLD AUTO: 1 % (ref 0–1)
BUN SERPL-MCNC: 26 MG/DL (ref 5–25)
CALCIUM SERPL-MCNC: 9.1 MG/DL (ref 8.3–10.1)
CARDIAC TROPONIN I PNL SERPL HS: 5 NG/L
CHLORIDE SERPL-SCNC: 102 MMOL/L (ref 96–108)
CO2 SERPL-SCNC: 26 MMOL/L (ref 21–32)
CREAT SERPL-MCNC: 1.07 MG/DL (ref 0.6–1.3)
EOSINOPHIL # BLD AUTO: 0.09 THOUSAND/ΜL (ref 0–0.61)
EOSINOPHIL NFR BLD AUTO: 2 % (ref 0–6)
ERYTHROCYTE [DISTWIDTH] IN BLOOD BY AUTOMATED COUNT: 12.5 % (ref 11.6–15.1)
GFR SERPL CREATININE-BSD FRML MDRD: 46 ML/MIN/1.73SQ M
GLUCOSE SERPL-MCNC: 94 MG/DL (ref 65–140)
HCT VFR BLD AUTO: 35.9 % (ref 34.8–46.1)
HGB BLD-MCNC: 11.9 G/DL (ref 11.5–15.4)
IMM GRANULOCYTES # BLD AUTO: 0.02 THOUSAND/UL (ref 0–0.2)
IMM GRANULOCYTES NFR BLD AUTO: 0 % (ref 0–2)
LYMPHOCYTES # BLD AUTO: 1.74 THOUSANDS/ΜL (ref 0.6–4.47)
LYMPHOCYTES NFR BLD AUTO: 30 % (ref 14–44)
MCH RBC QN AUTO: 31.6 PG (ref 26.8–34.3)
MCHC RBC AUTO-ENTMCNC: 33.1 G/DL (ref 31.4–37.4)
MCV RBC AUTO: 95 FL (ref 82–98)
MONOCYTES # BLD AUTO: 0.7 THOUSAND/ΜL (ref 0.17–1.22)
MONOCYTES NFR BLD AUTO: 12 % (ref 4–12)
NEUTROPHILS # BLD AUTO: 3.28 THOUSANDS/ΜL (ref 1.85–7.62)
NEUTS SEG NFR BLD AUTO: 55 % (ref 43–75)
NRBC BLD AUTO-RTO: 0 /100 WBCS
PLATELET # BLD AUTO: 203 THOUSANDS/UL (ref 149–390)
PMV BLD AUTO: 8.4 FL (ref 8.9–12.7)
POTASSIUM SERPL-SCNC: 4.2 MMOL/L (ref 3.5–5.3)
RBC # BLD AUTO: 3.77 MILLION/UL (ref 3.81–5.12)
SODIUM SERPL-SCNC: 137 MMOL/L (ref 135–147)
WBC # BLD AUTO: 5.88 THOUSAND/UL (ref 4.31–10.16)

## 2022-09-20 PROCEDURE — 99284 EMERGENCY DEPT VISIT MOD MDM: CPT

## 2022-09-20 PROCEDURE — 85025 COMPLETE CBC W/AUTO DIFF WBC: CPT | Performed by: EMERGENCY MEDICINE

## 2022-09-20 PROCEDURE — 36415 COLL VENOUS BLD VENIPUNCTURE: CPT | Performed by: EMERGENCY MEDICINE

## 2022-09-20 PROCEDURE — 71045 X-RAY EXAM CHEST 1 VIEW: CPT

## 2022-09-20 PROCEDURE — 84484 ASSAY OF TROPONIN QUANT: CPT | Performed by: EMERGENCY MEDICINE

## 2022-09-20 PROCEDURE — 80048 BASIC METABOLIC PNL TOTAL CA: CPT | Performed by: EMERGENCY MEDICINE

## 2022-09-20 PROCEDURE — 99284 EMERGENCY DEPT VISIT MOD MDM: CPT | Performed by: EMERGENCY MEDICINE

## 2022-09-20 PROCEDURE — 93005 ELECTROCARDIOGRAM TRACING: CPT

## 2022-09-21 LAB
ATRIAL RATE: 63 BPM
P AXIS: 80 DEGREES
PR INTERVAL: 238 MS
QRS AXIS: 83 DEGREES
QRSD INTERVAL: 136 MS
QT INTERVAL: 428 MS
QTC INTERVAL: 437 MS
T WAVE AXIS: 77 DEGREES
VENTRICULAR RATE: 63 BPM

## 2022-09-21 PROCEDURE — 93010 ELECTROCARDIOGRAM REPORT: CPT | Performed by: INTERNAL MEDICINE

## 2022-09-21 NOTE — ED PROVIDER NOTES
History  Chief Complaint   Patient presents with    Dizziness     Patient reports to ED complaining of episodes of lightheadedness, dizziness and blurred vision for months, but worse today  Patient reports shooting pains up her neck during these episodes  Patient complains of right shoulder pain  45-year-old female history of chronic right shoulder and neck pain presents for evaluation of exacerbation of that pain which subsequently caused her to feel lightheaded for a brief moment  Patient states symptoms are now fully resolved including her shoulder and neck pain and she otherwise feels well  Patient was out shopping when symptoms started and she had to sit down to rest   Denies associated chest pain, shortness of breath, nausea, vomiting  Prior to Admission Medications   Prescriptions Last Dose Informant Patient Reported? Taking? Multiple Vitamins-Minerals (CENTRUM SILVER PO)  Self Yes No   Sig: Take by mouth   Turmeric (QC TUMERIC COMPLEX PO)  Self Yes No   Sig: Take by mouth   b complex vitamins tablet  Self Yes No   Sig: Take 1 tablet by mouth daily   levothyroxine 25 mcg tablet  Self Yes No   Sig: Take 25 mcg by mouth daily   lidocaine (Lidoderm) 5 %  Self No No   Sig: Apply 1 patch topically daily for 5 days Remove & Discard patch within 12 hours or as directed by MD   primidone (MYSOLINE) 50 mg tablet  Self Yes No   Sig: Take by mouth 4 (four) times a day   temazepam (RESTORIL) 15 mg capsule  Self Yes No   Sig: Take 15 mg by mouth daily at bedtime as needed for sleep      Facility-Administered Medications: None       Past Medical History:   Diagnosis Date    Anxiety     Disease of thyroid gland        Past Surgical History:   Procedure Laterality Date    SHOULDER SURGERY         History reviewed  No pertinent family history  I have reviewed and agree with the history as documented      E-Cigarette/Vaping    E-Cigarette Use Never User      E-Cigarette/Vaping Substances     Social History     Tobacco Use    Smoking status: Never Smoker    Smokeless tobacco: Never Used   Vaping Use    Vaping Use: Never used   Substance Use Topics    Alcohol use: Never    Drug use: Never       Review of Systems   Constitutional: Negative for fever  Neurological: Positive for light-headedness  All other systems reviewed and are negative  Physical Exam  Physical Exam  Vitals and nursing note reviewed  Constitutional:       Appearance: She is well-developed  HENT:      Head: Normocephalic and atraumatic  Right Ear: External ear normal       Left Ear: External ear normal       Nose: Nose normal    Eyes:      General: No scleral icterus  Cardiovascular:      Rate and Rhythm: Normal rate  Pulmonary:      Effort: Pulmonary effort is normal  No respiratory distress  Abdominal:      General: There is no distension  Musculoskeletal:         General: No deformity  Normal range of motion  Cervical back: Normal range of motion  Comments: 5/5 strength of bilateral upper and lower extremities   Skin:     Findings: No rash  Neurological:      General: No focal deficit present  Mental Status: She is alert and oriented to person, place, and time     Psychiatric:         Mood and Affect: Mood normal          Vital Signs  ED Triage Vitals [09/20/22 1831]   Temperature Pulse Respirations Blood Pressure SpO2   97 9 °F (36 6 °C) 79 18 108/56 98 %      Temp Source Heart Rate Source Patient Position - Orthostatic VS BP Location FiO2 (%)   Oral Monitor Sitting Left arm --      Pain Score       10 - Worst Possible Pain           Vitals:    09/20/22 1831 09/20/22 2000   BP: 108/56    Pulse: 79 61   Patient Position - Orthostatic VS: Sitting          Visual Acuity      ED Medications  Medications - No data to display    Diagnostic Studies  Results Reviewed     Procedure Component Value Units Date/Time    HS Troponin 0hr (reflex protocol) [[id number]]  (Normal) Collected: 09/20/22 1904    Lab Status: Final result Specimen: Blood from Arm, Left Updated: 09/20/22 1935     hs TnI 0hr 5 ng/L     Basic metabolic panel [[id number]]  (Abnormal) Collected: 09/20/22 1904    Lab Status: Final result Specimen: Blood from Arm, Left Updated: 09/20/22 1922     Sodium 137 mmol/L      Potassium 4 2 mmol/L      Chloride 102 mmol/L      CO2 26 mmol/L      ANION GAP 9 mmol/L      BUN 26 mg/dL      Creatinine 1 07 mg/dL      Glucose 94 mg/dL      Calcium 9 1 mg/dL      eGFR 46 ml/min/1 73sq m     Narrative:      [place] guidelines for Chronic Kidney Disease (CKD):     Stage 1 with normal or high GFR (GFR > 90 mL/min/1 73 square meters)    Stage 2 Mild CKD (GFR = 60-89 mL/min/1 73 square meters)    Stage 3A Moderate CKD (GFR = 45-59 mL/min/1 73 square meters)    Stage 3B Moderate CKD (GFR = 30-44 mL/min/1 73 square meters)    Stage 4 Severe CKD (GFR = 15-29 mL/min/1 73 square meters)    Stage 5 End Stage CKD (GFR <15 mL/min/1 73 square meters)  Note: GFR calculation is accurate only with a steady state creatinine    CBC and differential [[id number]]  (Abnormal) Collected: 09/20/22 1904    Lab Status: Final result Specimen: Blood from Arm, Left Updated: 09/20/22 1911     WBC 5 88 Thousand/uL      RBC 3 77 Million/uL      Hemoglobin 11 9 g/dL      Hematocrit 35 9 %      MCV 95 fL      MCH 31 6 pg      MCHC 33 1 g/dL      RDW 12 5 %      MPV 8 4 fL      Platelets 945 Thousands/uL      nRBC 0 /100 WBCs      Neutrophils Relative 55 %      Immat GRANS % 0 %      Lymphocytes Relative 30 %      Monocytes Relative 12 %      Eosinophils Relative 2 %      Basophils Relative 1 %      Neutrophils Absolute 3 28 Thousands/µL      Immature Grans Absolute 0 02 Thousand/uL      Lymphocytes Absolute 1 74 Thousands/µL      Monocytes Absolute 0 70 Thousand/µL      Eosinophils Absolute 0 09 Thousand/µL      Basophils Absolute 0 05 Thousands/µL                  X-ray chest 1 view portable   Final Result by Saurabh Trinh James Cardenas MD (09/21 1849)      No acute cardiopulmonary disease  Findings are stable            Workstation performed: [id number]                    Procedures  Procedures         ED Course             HEART Risk Score    Flowsheet Row Most Recent Value   Heart Score Risk Calculator    History 0 Filed at: 09/21/2022 1908   ECG 0 Filed at: 09/21/2022 1908   Age 2 Filed at: 09/21/2022 1908   Risk Factors 1 Filed at: 09/21/2022 1908   Troponin 0 Filed at: 09/21/2022 1908   HEART Score 3 Filed at: 09/21/2022 1908                                      MDM  Number of Diagnoses or Management Options  Lightheadedness: new and requires workup  Diagnosis management comments:   26-year-old female presenting with lightheadedness which is now resolved  Obtain EKG, labs to assess for presyncopal symptoms  Upon reassessment, patient has remained asymptomatic and feels well at this time  Discussed all results with patient  Follow up with primary care provider  Amount and/or Complexity of Data Reviewed  Clinical lab tests: ordered and reviewed  Tests in the radiology section of CPT®: ordered and reviewed  Tests in the medicine section of CPT®: reviewed and ordered  Decide to obtain previous medical records or to obtain history from someone other than the patient: yes  Review and summarize past medical records: yes        Disposition  Final diagnoses:   Lightheadedness     Time reflects when diagnosis was documented in both MDM as applicable and the Disposition within this note     Time User Action Codes Description Comment    9/20/2022  7:49 PM Jesus Lora Add [R42] [address] [place]       ED Disposition     ED Disposition   Discharge    Condition   Stable    Date/Time   Tue Sep 20, 2022  7:49 PM    Comment   [facility] discharge to home/self care                 Follow-up Information     Follow up With Specialties Details Why Contact Info Additional Information    Cammie Ni MD Family Medicine   [address] [facility] [id number]  [address] [facility] Emergency Department Emergency Medicine  If symptoms worsen [address] [place],[address] [address]  [address] [facility] Emergency Department, [address] [place], [address]          Discharge Medication List as of 9/20/2022  7:59 PM      CONTINUE these medications which have NOT CHANGED    Details   b complex vitamins tablet Take 1 tablet by mouth daily, Historical Med      levothyroxine 25 mcg tablet Take 25 mcg by mouth daily, Historical Med      lidocaine (Lidoderm) 5 % Apply 1 patch topically daily for 5 days Remove & Discard patch within 12 hours or as directed by MD, Starting Sun 10/3/2021, Until Fri 10/8/2021, Normal      Multiple Vitamins-Minerals (CENTRUM SILVER PO) Take by mouth, Historical Med      primidone (MYSOLINE) 50 mg tablet Take by mouth 4 (four) times a day, Historical Med      temazepam (RESTORIL) 15 mg capsule Take 15 mg by mouth daily at bedtime as needed for sleep, Historical Med      Turmeric (QC TUMERIC COMPLEX PO) Take by mouth, Historical Med             No discharge procedures on file      PDMP Review     None          ED Provider  Electronically Signed by           Dolores Garcia DO  09/21/22 2009

## 2023-11-29 ENCOUNTER — CONSULT (OUTPATIENT)
Dept: PAIN MEDICINE | Facility: CLINIC | Age: 88
End: 2023-11-29
Payer: COMMERCIAL

## 2023-11-29 ENCOUNTER — TELEPHONE (OUTPATIENT)
Age: 88
End: 2023-11-29

## 2023-11-29 VITALS
SYSTOLIC BLOOD PRESSURE: 150 MMHG | WEIGHT: 105 LBS | TEMPERATURE: 97.9 F | BODY MASS INDEX: 17.93 KG/M2 | DIASTOLIC BLOOD PRESSURE: 86 MMHG | HEIGHT: 64 IN

## 2023-11-29 DIAGNOSIS — M47.812 CERVICAL SPONDYLOSIS: Primary | ICD-10-CM

## 2023-11-29 DIAGNOSIS — M54.2 NECK PAIN: ICD-10-CM

## 2023-11-29 DIAGNOSIS — M48.02 CERVICAL SPINAL STENOSIS: ICD-10-CM

## 2023-11-29 PROBLEM — M19.019 LOCALIZED, PRIMARY OSTEOARTHRITIS OF SHOULDER REGION: Status: ACTIVE | Noted: 2023-11-29

## 2023-11-29 PROBLEM — M75.120 FULL THICKNESS ROTATOR CUFF TEAR: Status: ACTIVE | Noted: 2023-11-29

## 2023-11-29 PROCEDURE — 99204 OFFICE O/P NEW MOD 45 MIN: CPT | Performed by: ANESTHESIOLOGY

## 2023-11-29 NOTE — PROGRESS NOTES
Assessment  1. Cervical spondylosis    2. Neck pain    3. Cervical spinal stenosis        Plan    Aide's neck pain persists despite time, relative rest, activity modification and therapy. Based on the patient's symptoms and examination, I suspect that her pain is being generated by the cervical facet joints. The facet joints are only one of several possible cervical pain generators. The patient has been experiencing moderate to severe axial spine pain that is causing functional deficit. The pain has been present for at least 3 months and is not improving with conservative care. I will schedule the patient for diagnostic cervical medial branch blockade using a double block paradigm. If the patient receives significant pain relief of appropriate duration with lidocaine 2%, we will confirm with bupivacaine 0.25%. If the patient demonstrates appropriate response to medial branch blockade we will schedule for radiofrequency ablation of the blocked nerves to provide long-term pain relief. In the office today, we reviewed the nature of the patient's pathology in depth using diagrams and models. I discussed the approach we would use for the medial branch block and provided literature for home review. The patient understands the risks associated with the procedure i and the patient will follow-up if further interested. My impressions and treatment recommendations were discussed in detail with the patient who verbalized understanding and had no further questions. Discharge instructions were provided. I personally saw and examined the patient and I agree with the above discussed plan of care. This note is created using dictation transcription. It may contain typographical errors, grammatical errors, improperly dictated words, background noise and other errors.     Orders Placed This Encounter   Procedures    FL spine and pain procedure     Standing Status:   Future     Standing Expiration Date:   11/29/2027 Order Specific Question:   Reason for Exam:     Answer:   right C4,5,6 MBB with 2% lidocaine     Order Specific Question:   Anticoagulant hold needed? Answer:   no     No orders of the defined types were placed in this encounter. History of Present Illness    Niles Gibson is a 80 y.o. female who presents in accompaniment of her granddaughter. She is a woman who underwent cervical epidural steroid injections reporting mostly neck pain 10 out of 10. She has been undergoing chiropractic treatment. Pain persists which significant impairs with her daily activities. Is constant described as shooting achy sharp and cutting with subjective weakness. Lying down decreases symptoms with sitting walking and bending aggravates her pain. Physical therapy chiropractic treatment provided moderate relief as stated acupuncture. I have personally reviewed and/or updated the patient's past medical history, past surgical history, family history, social history, current medications, allergies, and vital signs today. Review of Systems   Constitutional:  Negative for fever and unexpected weight change. HENT:  Negative for trouble swallowing. Eyes:  Negative for visual disturbance. Respiratory:  Negative for shortness of breath and wheezing. Cardiovascular:  Positive for palpitations and leg swelling. Negative for chest pain. Gastrointestinal:  Negative for constipation, diarrhea, nausea and vomiting. Endocrine: Negative for cold intolerance, heat intolerance and polydipsia. Genitourinary:  Negative for difficulty urinating and frequency. Musculoskeletal:  Positive for arthralgias and myalgias. Negative for gait problem and joint swelling. Skin:  Negative for rash. Neurological:  Positive for dizziness and numbness. Negative for seizures, syncope, weakness and headaches. Hematological:  Does not bruise/bleed easily. Psychiatric/Behavioral:  Negative for dysphoric mood.     All other systems reviewed and are negative. Patient Active Problem List   Diagnosis    Full thickness rotator cuff tear    Localized, primary osteoarthritis of shoulder region       Past Medical History:   Diagnosis Date    Anxiety     Disease of thyroid gland        Past Surgical History:   Procedure Laterality Date    SHOULDER SURGERY         History reviewed. No pertinent family history. Social History     Occupational History    Not on file   Tobacco Use    Smoking status: Never    Smokeless tobacco: Never   Vaping Use    Vaping Use: Never used   Substance and Sexual Activity    Alcohol use: Never    Drug use: Never    Sexual activity: Not on file       Current Outpatient Medications on File Prior to Visit   Medication Sig    levothyroxine 25 mcg tablet Take 25 mcg by mouth daily    Multiple Vitamins-Minerals (CENTRUM SILVER PO) Take by mouth    Turmeric (QC TUMERIC COMPLEX PO) Take by mouth    b complex vitamins tablet Take 1 tablet by mouth daily (Patient not taking: Reported on 11/29/2023)    lidocaine (Lidoderm) 5 % Apply 1 patch topically daily for 5 days Remove & Discard patch within 12 hours or as directed by MD    primidone (MYSOLINE) 50 mg tablet Take by mouth 4 (four) times a day (Patient not taking: Reported on 11/29/2023)    temazepam (RESTORIL) 15 mg capsule Take 15 mg by mouth daily at bedtime as needed for sleep (Patient not taking: Reported on 11/29/2023)     No current facility-administered medications on file prior to visit. Allergies   Allergen Reactions    Hylan G-F 20 Other (See Comments)    Influenza Virus Vaccine Other (See Comments)       Physical Exam    /86 (BP Location: Left arm, Patient Position: Sitting, Cuff Size: Standard)   Temp 97.9 °F (36.6 °C)   Ht 5' 4" (1.626 m)   Wt 47.6 kg (105 lb)   BMI 18.02 kg/m²     Constitutional: normal, well developed, well nourished, alert, in no distress and non-toxic and no overt pain behavior.   Eyes: anicteric  HEENT: grossly intact  Neck: supple, symmetric, trachea midline and no masses   Pulmonary:even and unlabored  Cardiovascular:No edema or pitting edema present  Skin:Normal without rashes or lesions and well hydrated  Psychiatric:Mood and affect appropriate  Neurologic:Cranial Nerves II-XII grossly intact  Musculoskeletal:normal,  Inspection: Normal station and gait. Skin intact without erythema. No sensory loss. There is no atrophy. Palpation: There is tenderness to palpation overlying the right cervical paraspinals, cervical facet joints. There is no tenderness over the upper trapezius muscles bilateral. No shoulder tenderness  Motor/Strength: Equal strength in the bilateral upper extremities  Reflexes: equal and symmetric in the upper limbs. Sensation: Sensation intact to light touch and pinprick in the upper limbs. Maneuvers: Positive facet loading on the right    Imaging  MRI Cervical Spine @ [facility] 10-28-22  Impression:  Multilevel discongenic, uncinate, and facet degenerative changes, as described. No intrinsic cervical cord signal alteration. Multilevel subtle degenerative anterolisthesis. C2-3: Mild central canal narrowing. Mild bilateral foraminal stenosis. C3-4: Moderate central canal stenosis. Subtle cord compression. Moderate right and mild left foraminal stenosis. C4-5: Advanced right foraminal stenosis. C5-6: Effacement of subarachnoid space with moderate central canal stenosis and mild cord compression. Advanced right foraminal stenosis. C6-7: Moderate left foraminal stenosis. LEFT SHOULDER @  10-3-21     INDICATION:   injury. COMPARISON:  None     VIEWS:  XR SHOULDER 2+ VW LEFT   Images: 3     FINDINGS:     There is no acute fracture or dislocation. The humeral head is elevated with respect to the glenoid concerning for rotator cuff arthropathy. Mild osteoarthritis of the glenohumeral and acromioclavicular joints. No lytic or blastic osseous lesion.      Soft tissues are unremarkable. IMPRESSION:     No acute osseous abnormality. Degenerative changes. Suspected rotator cuff arthropathy. LEFT HUMERUS @ 10-3-21     INDICATION:   injury. COMPARISON:  None     VIEWS:  XR HUMERUS LEFT   Images: 2     FINDINGS:     There is no acute fracture or dislocation. Degenerative changes at the glenohumeral joint. No lytic or blastic osseous lesion. Soft tissues are unremarkable. IMPRESSION:     No acute osseous abnormality.

## 2023-11-29 NOTE — TELEPHONE ENCOUNTER
Caller: Helena Khan    Doctor: Lucius Steen    Reason for call: Pt is calling to schedule a procedure    Call back#: [phone]

## 2023-12-01 NOTE — TELEPHONE ENCOUNTER
PRE OP INSTRUCTIONS:           Hold medication  x _ full days prior, last dose on _           Patient advised to hold medication from Date till their appointment at that time instructions to restart will be given. Patient stated verbal understanding. Aware that nursing may/will call to review hold dates as well. -If you are on prescription blood thinners, you may have to hold the medication for several days before the procedure. Please call the office to discuss medication holds at [phone].    -Do not eat or drink ONE HOUR prior to your procedure. If you are diabetic, may follow regular breakfast/lunch schedule and take usual    diabetic medications.  -Lumbar( low back) procedure, please wear comfortable slacks/pants.  -Cervical (neck) procedure, please wear a shirt/blouse that is easy to remove.  -A  is required to take you home form your procedure.  -Continue all to take prescribed medication the day of your procedure, including blood pressure medications.  -If you are prescribe antibiotics, have an active infection or have an open wound, please contact the office at [phone].  -Please refrain from any vaccinations two weeks before and two weeks after injection.  -Insurance authorization received in not a guarantee of payment per your insurance company's authorization disclaimer and it is    your responsibility to verify your benefits. -If you have any questions about the instructions, please call me at [phone]          -PATIENT INSTRUCTED TO STOP ALL NSAID'S 4 DAYS PRIOR TO PROCEDURE            EXCEPT FOR IBUPROFEN IT CAN BE TAKEN UP TO 24 HOURS PRIOR TO PROCEDURE. MBB PRE OP INSTRUCTIONS:             Please do not eat or drink 1 hour prior to the procedure. If you are not feeling well, have any kind of infection or are placed on antibiotics for any reason, please call the office,           we will have to reschedule your procedure. Patient on ABX procedure canceled till off ABX and S/S free for 48 hours          You will need a , 18 years or older. Reviewed instructions: , NPO 1 hour prior, loose-fitting/comfortable clothes, if ill/fever/infx/abx to call and reschedule. Also pain level at leat 5/10 if it is not please call and talk to nurse [phone]. Please continue to take any long acting pain medication as prescribed. Refrain from PRN, as-needed pain meds 6h prior and 6-8 hours after anything you would buy over the counter at a store. It is recommended that you try to continue with your normal activities of daily living to see if the medial branch block is helping. You will be sent home with a pain diary that you will need to return to us either by dropping it off, mailing it in, faxing it or you can           upload it to your my chart for the doctor to review to allow us to assess the next steps in your treatment. Patient called back and scheduled for Procedure    All pre procedure instructions were given to patient   Nothing to eat or drink for 1 hour prior  Loose fitting clothing   PATIENT INSTRUCTED TO STOP ALL NSAID'S 4 DAYS PRIOR TO PROCEDURE EXCEPT FOR IBUPROFEN IT CAN BE TAKEN UP TO 24 HOURS PRIOR TO PROCEDURE.    DENIES ANTICOAG'S OR ASA   Denies Antibx   Needs    Patient instructed to contact our office if becomes sick or gets put on any ANTIBIOTIC or has any active infections   Refrain from any vaccines 2 weeks before & 2 weeks after  Insurance auth received but is not a guarantee of payment per your insurance company's authorization disclaimer and it is your responsibility to verify your benefits   COVID -19 screening complete

## 2023-12-01 NOTE — TELEPHONE ENCOUNTER
Caller: Cristóbal     Doctor: Dr Donnie Engel    Reason for call: Returning call from  per patient     Call back#: [phone]

## 2023-12-01 NOTE — TELEPHONE ENCOUNTER
Caller: peggy    Doctor: Albino Casey    Reason for call: shirley the grand daughter would like a call back.     Call back#: [phone]

## 2023-12-19 ENCOUNTER — HOSPITAL ENCOUNTER (OUTPATIENT)
Dept: RADIOLOGY | Facility: CLINIC | Age: 88
Discharge: HOME | End: 2023-12-19

## 2023-12-19 VITALS — TEMPERATURE: 97.5 F | DIASTOLIC BLOOD PRESSURE: 60 MMHG | RESPIRATION RATE: 18 BRPM | SYSTOLIC BLOOD PRESSURE: 138 MMHG

## 2023-12-19 DIAGNOSIS — M47.812 CERVICAL SPONDYLOSIS: ICD-10-CM

## 2023-12-19 NOTE — H&P
History of Present Illness: The patient is a 89 y.o. female who presents with complaints of neck pain.    Past Medical History:   Diagnosis Date    Anxiety     Disease of thyroid gland        Past Surgical History:   Procedure Laterality Date    SHOULDER SURGERY           Current Outpatient Medications:     b complex vitamins tablet, Take 1 tablet by mouth daily (Patient not taking: Reported on 11/29/2023), Disp: , Rfl:     levothyroxine 25 mcg tablet, Take 25 mcg by mouth daily, Disp: , Rfl:     lidocaine (Lidoderm) 5 %, Apply 1 patch topically daily for 5 days Remove & Discard patch within 12 hours or as directed by MD, Disp: 5 patch, Rfl: 0    Multiple Vitamins-Minerals (CENTRUM SILVER PO), Take by mouth, Disp: , Rfl:     primidone (MYSOLINE) 50 mg tablet, Take by mouth 4 (four) times a day (Patient not taking: Reported on 11/29/2023), Disp: , Rfl:     temazepam (RESTORIL) 15 mg capsule, Take 15 mg by mouth daily at bedtime as needed for sleep (Patient not taking: Reported on 11/29/2023), Disp: , Rfl:     Turmeric (QC TUMERIC COMPLEX PO), Take by mouth, Disp: , Rfl:     Current Facility-Administered Medications:     lidocaine (PF) (XYLOCAINE-MPF) 2 % injection 3 mL, 3 mL, Perineural, Once, Abel Garrett DO    Allergies   Allergen Reactions    Hylan G-F 20 Other (See Comments)    Influenza Virus Vaccine Other (See Comments)       Physical Exam:   General: Awake, Alert, Oriented x 3, Mood and affect appropriate  Respiratory: Respirations even and unlabored  Cardiovascular: Peripheral pulses intact; no edema  Musculoskeletal Exam: Pain with cervical extension    ASA Score: II         Assessment:   1. Cervical spondylosis        Plan: right C4,5,6 MBB with 2% lidocaine

## 2024-04-02 ENCOUNTER — OFFICE VISIT (OUTPATIENT)
Dept: PAIN MEDICINE | Facility: CLINIC | Age: 89
End: 2024-04-02
Payer: COMMERCIAL

## 2024-04-02 ENCOUNTER — APPOINTMENT (OUTPATIENT)
Dept: RADIOLOGY | Facility: CLINIC | Age: 89
End: 2024-04-02
Payer: COMMERCIAL

## 2024-04-02 VITALS
HEIGHT: 64 IN | WEIGHT: 104.4 LBS | TEMPERATURE: 97.7 F | SYSTOLIC BLOOD PRESSURE: 120 MMHG | DIASTOLIC BLOOD PRESSURE: 80 MMHG | BODY MASS INDEX: 17.83 KG/M2

## 2024-04-02 DIAGNOSIS — M25.511 CHRONIC RIGHT SHOULDER PAIN: ICD-10-CM

## 2024-04-02 DIAGNOSIS — G89.29 CHRONIC RIGHT SHOULDER PAIN: ICD-10-CM

## 2024-04-02 DIAGNOSIS — M47.812 CERVICAL SPONDYLOSIS: ICD-10-CM

## 2024-04-02 DIAGNOSIS — M54.12 CERVICAL RADICULOPATHY: ICD-10-CM

## 2024-04-02 DIAGNOSIS — M48.02 CERVICAL SPINAL STENOSIS: Primary | ICD-10-CM

## 2024-04-02 PROCEDURE — 73030 X-RAY EXAM OF SHOULDER: CPT

## 2024-04-02 PROCEDURE — 99214 OFFICE O/P EST MOD 30 MIN: CPT | Performed by: NURSE PRACTITIONER

## 2024-04-02 PROCEDURE — G2211 COMPLEX E/M VISIT ADD ON: HCPCS | Performed by: NURSE PRACTITIONER

## 2024-04-02 RX ORDER — GABAPENTIN 100 MG/1
100 CAPSULE ORAL
Qty: 30 CAPSULE | Refills: 0 | Status: SHIPPED | OUTPATIENT
Start: 2024-04-02

## 2024-04-02 NOTE — PROGRESS NOTES
Assessment:  1. Cervical spinal stenosis    2. Chronic right shoulder pain    3. Cervical radiculopathy    4. Cervical spondylosis        Plan:  The patient is a 89 y.o. female last seen on 11/29/2023 who presents for a follow up office visit in regards to chronic pain secondary to neck pain, cervical spondylosis, cervical stenosis, and cervical radiculopathy.  The patient presents today with ongoing neck and right arm pain.  The pain does not always occur together.  Some days that the arm pain is worse.  She has not had any imaging of the right shoulder.  Therefore I will order an x-ray.  I will contact her with the results.    MRI cervical spine was reviewed.  She does have moderate central stenosis at C5-C6 causing advanced right foraminal stenosis.  She had underwent 3 cervical epidural steroid injections at [facility] which provided no relief.  To help with the neuropathic pain, I will start her on gabapentin 100 mg 1 tablet at bedtime.  Side effects were reviewed with the patient which include drowsiness, dizziness, blurred vision, and peripheral edema.  She was instructed to contact the office in 1 week with an update on her pain symptoms.  Patient does have stage III kidney disease so creatinine clearance will need to be calculated to find max dosing,  prior to increasing it.    We will hold off on the right C4-C6 medial branch block injection to see how she does with the gabapentin, and to see what the x-ray of the right shoulder shows.      The patient will follow-up in 12 weeks for medication prescription refill and reevaluation. The patient was advised to contact the office should their symptoms worsen in the interim. The patient was agreeable and verbalized an understanding.        History of Present Illness:    The patient is a 89 y.o. female last seen on 11/29/2023 who presents for a follow up office visit in regards to chronic pain secondary to neck pain, cervical spondylosis, cervical  stenosis, and cervical radiculopathy.  Her last office visit was December 19, 2023 in which she was scheduled for a right C4-C6 medial branch block injection.  The procedure was canceled due to the patient arriving with no pain.  Patient presents today with ongoing pain that is located in the neck and radiates down into the right shoulder and bicep.  The pain is constant and described as dull aching sharp and shooting.  She states the pain improves when sitting she often tries to immobilize her right arm to stop the pain to stop the pain. She is rating her pain a 10/10 on the numeric rating scale.    She currently takes Advil as needed     She had underwent a cervical epidural steroid injection in November 2022 and April 2023 at [facility] and a right C6-C7 transforaminal epidural steroid injection December 2022.  Her granddaughter states the injections provided minimal pain relief    I have personally reviewed and/or updated the patient's past medical history, past surgical history, family history, social history, current medications, allergies, and vital signs today.       Review of Systems:    Review of Systems   Respiratory:  Positive for shortness of breath.    Cardiovascular:  Positive for chest pain.   Gastrointestinal:  Negative for constipation, diarrhea, nausea and vomiting.   Musculoskeletal:  Positive for gait problem, joint swelling (joint stiffness) and neck pain. Negative for arthralgias and myalgias.        Decreased ROM  RUE Pain   Skin:  Negative for rash.   Neurological:  Positive for weakness. Negative for dizziness and seizures.   All other systems reviewed and are negative.        Past Medical History:   Diagnosis Date    Anxiety     Disease of thyroid gland        Past Surgical History:   Procedure Laterality Date    SHOULDER SURGERY         History reviewed. No pertinent family history.    Social History     Occupational History    Not on file   Tobacco Use    Smoking status: Never     "Smokeless tobacco: Never   Vaping Use    Vaping status: Never Used   Substance and Sexual Activity    Alcohol use: Not Currently    Drug use: Never    Sexual activity: Not Currently         Current Outpatient Medications:     gabapentin (NEURONTIN) 100 mg capsule, Take 1 capsule (100 mg total) by mouth daily at bedtime, Disp: 30 capsule, Rfl: 0    levothyroxine 25 mcg tablet, Take 25 mcg by mouth daily, Disp: , Rfl:     Multiple Vitamins-Minerals (CENTRUM SILVER PO), Take by mouth, Disp: , Rfl:     Turmeric (QC TUMERIC COMPLEX PO), Take by mouth, Disp: , Rfl:     b complex vitamins tablet, Take 1 tablet by mouth daily (Patient not taking: Reported on 11/29/2023), Disp: , Rfl:     lidocaine (Lidoderm) 5 %, Apply 1 patch topically daily for 5 days Remove & Discard patch within 12 hours or as directed by MD, Disp: 5 patch, Rfl: 0    primidone (MYSOLINE) 50 mg tablet, Take by mouth 4 (four) times a day (Patient not taking: Reported on 11/29/2023), Disp: , Rfl:     temazepam (RESTORIL) 15 mg capsule, Take 15 mg by mouth daily at bedtime as needed for sleep (Patient not taking: Reported on 11/29/2023), Disp: , Rfl:     Allergies   Allergen Reactions    Hylan G-F 20 Other (See Comments)    Influenza Virus Vaccine Other (See Comments)       Physical Exam:    /80 (BP Location: Left arm, Patient Position: Sitting, Cuff Size: Adult)   Temp 97.7 °F (36.5 °C)   Ht 5' 4\" (1.626 m)   Wt 47.4 kg (104 lb 6.4 oz)   BMI 17.92 kg/m²     Constitutional:normal, well developed, well nourished, alert, in no distress and non-toxic and no overt pain behavior.  Eyes:anicteric  HEENT:grossly intact  Neck:supple, symmetric, trachea midline and no masses   Pulmonary:even and unlabored  Cardiovascular:No edema or pitting edema present  Skin:Normal without rashes or lesions and well hydrated  Psychiatric:Mood and affect appropriate  Neurologic:Cranial Nerves II-XII grossly intact  Musculoskeletal:normal    Right Shoulder " Exam    Appearance:  Normal with no swelling or bruising and no obvious joint deformity  Palpation/Tenderness:  Tenderness right AC joint   Active Range of Motion:  Flexion: Minimally limited and Abduction: Minimally limited  Special Tests:  Empty Can Test:  negative  Neer's Impingement Test:  positive     Imaging    MRI cervical spine [place] October 28, 2022    Straightening of the cervical lordosis.  Degenerative disc space narrowing at multiple levels.  Most severe at C3-C4.  Also advanced at C5-C6.  Moderate to advanced at C2-C3 and C4-C5.  Mild narrowing at C6/C7.    C2-C3 mild to moderate generalized annular bulge.  Slight effacement of the subarachnoid space with mild central canal narrowing when combined with ligamentum flavum hypertrophy.  No cord compression.  Mild facet and uncinate hypertrophy contributing to mild bilateral foraminal stenosis.    C3-C4 mild to moderate diffuse broad based disc osteophyte complex combined ligamentum flavum hypertrophy with secondary effacement of subarachnoid space and moderate central canal stenosis.  Subtle cord compression.  Uncinate and facet hypertrophy contributing to moderate right and mild left foraminal stenosis.    C4-C5 no focal disc protrusion.  No significant central canal stenosis.  Prominent right facet hypertrophy and associated uncinate hypertrophy contributing to advanced right foraminal stenosis.  Left neural foramen is relatively patent.    C5-C6 moderate diffuse generalized broad-based disc osteophyte complex combined with ligamentum flavum hypertrophy and subtle anterior listhesis contributing to effacement of the subarachnoid space with moderate central canal stenosis and mild cord compression.  Right greater than left facet hypertrophy as well as uncinate hypertrophy with advanced right foraminal stenosis.  Left neural foramen are patent.    C6/C7 medial annular bulge.  Slight effacement of the subarachnoid space without significant central  canal stenosis.  Mild facet and uncinate hypertrophy contributing to moderate left foraminal stenosis.  Mild right foramen is patent.    C7-T1 mild annular bulge.  No significant central canal or foraminal stenosis.    Impression    Multilevel discogenic uncinate and facet degenerative changes as described.    No intrinsic cervical cord signal alterations.    Multilevel subtle degenerative anterolisthesis.    C2-C3 mild central canal narrowing, bilateral foraminal stenosis.    C3-C4 moderate central canal stenosis.  Subtle cord compression.  Moderate right and mild left foraminal stenosis.    C4-C5 advanced right foraminal stenosis.    C5-C6 effacement of the subarachnoid space with moderate central canal stenosis and mild cord compression.  Advanced right foraminal stenosis.    C6/C7 moderate left foraminal stenosis.    No orders to display         Orders Placed This Encounter   Procedures    XR shoulder 2+ vw right

## 2024-04-04 ENCOUNTER — TELEPHONE (OUTPATIENT)
Dept: PAIN MEDICINE | Facility: CLINIC | Age: 89
End: 2024-04-04

## 2024-04-04 ENCOUNTER — TELEPHONE (OUTPATIENT)
Age: 89
End: 2024-04-04

## 2024-04-04 DIAGNOSIS — M47.812 ARTHROPATHY OF CERVICAL FACET JOINT: Primary | ICD-10-CM

## 2024-04-04 NOTE — TELEPHONE ENCOUNTER
----- Message from MAXINE Marcos sent at 4/4/2024  7:18 AM EDT -----  Please let patient know right shoulder xray showed that the hardware from the shoulder replacement is intact. So her arm pain is most likely coming from her neck

## 2024-04-04 NOTE — TELEPHONE ENCOUNTER
Aware. If refuses to continue gabapentin, we can try Lyrica 25 mg 1 tab at night.  She can also rescheduled MBB, but the patient needs to have pain and it will not help her arm pain (which was the main issue when I saw her)

## 2024-04-04 NOTE — TELEPHONE ENCOUNTER
Caller: pt daughter shirley     Doctor: dr choudhury    Reason for call: she would like to speak with nurse in regards to recent script as well as next steps of treatment     Call back#: [phone]

## 2024-04-04 NOTE — TELEPHONE ENCOUNTER
Caller: Agustina (Grandchild)     Doctor:     Reason for call: Patient going to stop the gabapentin today and would like to schedule the injection     Call back#: [phone]

## 2024-04-04 NOTE — TELEPHONE ENCOUNTER
LIZZY    S/w shirley per medical communication consent on file. Per shirley, pt started gabapentin last night with dizziness and drowsiness this morning as well as improved pain. Advised Shirley, se's may improve as the medication builds up in the pt's system. The timing of the medication can also be adjusted. Advised Shirley, if the pt would like to continue gabapentin, please cb if the se's do not improve / get worse in the next 7 days.     Advised Shirley of x ray results - shoulder hardware is intact. Arm pain is likely coming from the pt's neck.     Explained MBB / RFA as a series of diagnostic tests which are not designed to provide prolonged relief but rather, identify the origin of the pain. Pt will come in to the procedure suite, pain should be 5+ at the time of the procedure. Pt will be given a pain diary after the mbb to log the amount of relief after the procedure. The pain diary will be reviewed and if it is positive, a confirmatory mbb will be scheduled and the process will be repeated. If the mbb is positive again, the pt will go on for RFA which is a procedure that uses electricty to put a small blister on the nerve root. This blister creates scar tissue and helps to block / slow the pain signals.     Shirley verbalized understanding. Stated that she will discuss gabapentin with her grandmother and cb if she refuses to continue the medication.

## 2024-04-25 ENCOUNTER — HOSPITAL ENCOUNTER (EMERGENCY)
Facility: HOSPITAL | Age: 89
Discharge: HOME | End: 2024-04-25
Attending: EMERGENCY MEDICINE
Payer: COMMERCIAL

## 2024-04-25 ENCOUNTER — APPOINTMENT (EMERGENCY)
Dept: RADIOLOGY | Facility: HOSPITAL | Age: 89
End: 2024-04-25
Payer: COMMERCIAL

## 2024-04-25 VITALS
TEMPERATURE: 97.8 F | DIASTOLIC BLOOD PRESSURE: 62 MMHG | OXYGEN SATURATION: 97 % | SYSTOLIC BLOOD PRESSURE: 140 MMHG | HEART RATE: 73 BPM | RESPIRATION RATE: 17 BRPM

## 2024-04-25 DIAGNOSIS — R06.09 EXERTIONAL DYSPNEA: ICD-10-CM

## 2024-04-25 DIAGNOSIS — R42 INTERMITTENT LIGHTHEADEDNESS: Primary | ICD-10-CM

## 2024-04-25 DIAGNOSIS — E87.1 HYPONATREMIA: ICD-10-CM

## 2024-04-25 LAB
2HR DELTA HS TROPONIN: 0 NG/L
ALBUMIN SERPL BCP-MCNC: 4.2 G/DL (ref 3.5–5)
ALP SERPL-CCNC: 42 U/L (ref 34–104)
ALT SERPL W P-5'-P-CCNC: 17 U/L (ref 7–52)
ANION GAP SERPL CALCULATED.3IONS-SCNC: 8 MMOL/L (ref 4–13)
AST SERPL W P-5'-P-CCNC: 24 U/L (ref 13–39)
BACTERIA UR QL AUTO: NORMAL /HPF
BASOPHILS # BLD AUTO: 0.04 THOUSANDS/ÂΜL (ref 0–0.1)
BASOPHILS NFR BLD AUTO: 1 % (ref 0–1)
BILIRUB SERPL-MCNC: 0.68 MG/DL (ref 0.2–1)
BILIRUB UR QL STRIP: NEGATIVE
BUN SERPL-MCNC: 26 MG/DL (ref 5–25)
CALCIUM SERPL-MCNC: 9.1 MG/DL (ref 8.4–10.2)
CARDIAC TROPONIN I PNL SERPL HS: 6 NG/L
CARDIAC TROPONIN I PNL SERPL HS: 6 NG/L
CHLORIDE SERPL-SCNC: 99 MMOL/L (ref 96–108)
CLARITY UR: CLEAR
CO2 SERPL-SCNC: 26 MMOL/L (ref 21–32)
COLOR UR: YELLOW
CREAT SERPL-MCNC: 1.02 MG/DL (ref 0.6–1.3)
EOSINOPHIL # BLD AUTO: 0.02 THOUSAND/ÂΜL (ref 0–0.61)
EOSINOPHIL NFR BLD AUTO: 0 % (ref 0–6)
ERYTHROCYTE [DISTWIDTH] IN BLOOD BY AUTOMATED COUNT: 12.4 % (ref 11.6–15.1)
GFR SERPL CREATININE-BSD FRML MDRD: 48 ML/MIN/1.73SQ M
GLUCOSE SERPL-MCNC: 103 MG/DL (ref 65–140)
GLUCOSE UR STRIP-MCNC: NEGATIVE MG/DL
HCT VFR BLD AUTO: 36.5 % (ref 34.8–46.1)
HGB BLD-MCNC: 12.2 G/DL (ref 11.5–15.4)
HGB UR QL STRIP.AUTO: NEGATIVE
IMM GRANULOCYTES # BLD AUTO: 0.05 THOUSAND/UL (ref 0–0.2)
IMM GRANULOCYTES NFR BLD AUTO: 1 % (ref 0–2)
KETONES UR STRIP-MCNC: NEGATIVE MG/DL
LEUKOCYTE ESTERASE UR QL STRIP: ABNORMAL
LIPASE SERPL-CCNC: 23 U/L (ref 11–82)
LYMPHOCYTES # BLD AUTO: 1.28 THOUSANDS/ÂΜL (ref 0.6–4.47)
LYMPHOCYTES NFR BLD AUTO: 19 % (ref 14–44)
MCH RBC QN AUTO: 31.1 PG (ref 26.8–34.3)
MCHC RBC AUTO-ENTMCNC: 33.4 G/DL (ref 31.4–37.4)
MCV RBC AUTO: 93 FL (ref 82–98)
MONOCYTES # BLD AUTO: 0.59 THOUSAND/ÂΜL (ref 0.17–1.22)
MONOCYTES NFR BLD AUTO: 9 % (ref 4–12)
NEUTROPHILS # BLD AUTO: 4.89 THOUSANDS/ÂΜL (ref 1.85–7.62)
NEUTS SEG NFR BLD AUTO: 70 % (ref 43–75)
NITRITE UR QL STRIP: NEGATIVE
NON-SQ EPI CELLS URNS QL MICRO: NORMAL /HPF
NRBC BLD AUTO-RTO: 0 /100 WBCS
PH UR STRIP.AUTO: 5 [PH]
PLATELET # BLD AUTO: 184 THOUSANDS/UL (ref 149–390)
PMV BLD AUTO: 8.3 FL (ref 8.9–12.7)
POTASSIUM SERPL-SCNC: 4.2 MMOL/L (ref 3.5–5.3)
PROT SERPL-MCNC: 6.7 G/DL (ref 6.4–8.4)
PROT UR STRIP-MCNC: NEGATIVE MG/DL
RBC # BLD AUTO: 3.92 MILLION/UL (ref 3.81–5.12)
RBC #/AREA URNS AUTO: NORMAL /HPF
SODIUM SERPL-SCNC: 133 MMOL/L (ref 135–147)
SP GR UR STRIP.AUTO: 1.02 (ref 1–1.03)
UROBILINOGEN UR STRIP-ACNC: <2 MG/DL
WBC # BLD AUTO: 6.87 THOUSAND/UL (ref 4.31–10.16)
WBC #/AREA URNS AUTO: NORMAL /HPF

## 2024-04-25 PROCEDURE — 85025 COMPLETE CBC W/AUTO DIFF WBC: CPT | Performed by: EMERGENCY MEDICINE

## 2024-04-25 PROCEDURE — 80053 COMPREHEN METABOLIC PANEL: CPT | Performed by: EMERGENCY MEDICINE

## 2024-04-25 PROCEDURE — 99283 EMERGENCY DEPT VISIT LOW MDM: CPT

## 2024-04-25 PROCEDURE — 99285 EMERGENCY DEPT VISIT HI MDM: CPT | Performed by: EMERGENCY MEDICINE

## 2024-04-25 PROCEDURE — 81001 URINALYSIS AUTO W/SCOPE: CPT | Performed by: EMERGENCY MEDICINE

## 2024-04-25 PROCEDURE — 36415 COLL VENOUS BLD VENIPUNCTURE: CPT

## 2024-04-25 PROCEDURE — 71045 X-RAY EXAM CHEST 1 VIEW: CPT

## 2024-04-25 PROCEDURE — 84484 ASSAY OF TROPONIN QUANT: CPT | Performed by: EMERGENCY MEDICINE

## 2024-04-25 PROCEDURE — 83690 ASSAY OF LIPASE: CPT | Performed by: EMERGENCY MEDICINE

## 2024-04-25 PROCEDURE — 96360 HYDRATION IV INFUSION INIT: CPT

## 2024-04-25 RX ADMIN — SODIUM CHLORIDE 500 ML: 0.9 INJECTION, SOLUTION INTRAVENOUS at 12:49

## 2024-04-25 NOTE — ED PROVIDER NOTES
History  Chief Complaint   Patient presents with    Abnormal Lab     Pt had blood work done yesterday and it showed she had a sodium of 127. Per family pt has increased confusion over the last few months, pt reports feeling dizzy when she stands up. Pt reports increase swelling in her legs and sob with exertion.      89 year old female presents for evaluation of hyponatremia on outpatient labs.  Patient was found to have a sodium of 127.  She states she drank nine 8 oz cups of water prior to having the labs performed.  Patient states she has had issues with shortness of breath on exertion as well as lightheadedness after eating and when standing up.  Her daughter has been concerned with signs of increasing confusion.  Patient has an appointment with her primary care provider for next Wednesday.           Prior to Admission Medications   Prescriptions Last Dose Informant Patient Reported? Taking?   Multiple Vitamins-Minerals (CENTRUM SILVER PO) 4/25/2024 Self Yes Yes   Sig: Take by mouth   Turmeric (QC TUMERIC COMPLEX PO) 4/25/2024 Self Yes Yes   Sig: Take by mouth   b complex vitamins tablet  Self Yes No   Sig: Take 1 tablet by mouth daily   Patient not taking: Reported on 11/29/2023   gabapentin (NEURONTIN) 100 mg capsule Not Taking  No No   Sig: Take 1 capsule (100 mg total) by mouth daily at bedtime   Patient not taking: Reported on 4/25/2024   levothyroxine 25 mcg tablet 4/25/2024 Self Yes Yes   Sig: Take 25 mcg by mouth daily   lidocaine (Lidoderm) 5 %  Self No No   Sig: Apply 1 patch topically daily for 5 days Remove & Discard patch within 12 hours or as directed by MD   primidone (MYSOLINE) 50 mg tablet  Self Yes No   Sig: Take by mouth 4 (four) times a day   Patient not taking: Reported on 11/29/2023   temazepam (RESTORIL) 15 mg capsule  Self Yes No   Sig: Take 15 mg by mouth daily at bedtime as needed for sleep   Patient not taking: Reported on 11/29/2023      Facility-Administered Medications: None        Past Medical History:   Diagnosis Date    Anxiety     Disease of thyroid gland        Past Surgical History:   Procedure Laterality Date    SHOULDER SURGERY         History reviewed. No pertinent family history.  I have reviewed and agree with the history as documented.    E-Cigarette/Vaping    E-Cigarette Use Never User      E-Cigarette/Vaping Substances    Nicotine No     THC No     CBD No     Flavoring No     Other No     Unknown No      Social History     Tobacco Use    Smoking status: Never    Smokeless tobacco: Never   Vaping Use    Vaping status: Never Used   Substance Use Topics    Alcohol use: Not Currently    Drug use: Never       Review of Systems    Physical Exam  Physical Exam  Vitals and nursing note reviewed.   HENT:      Head: Normocephalic and atraumatic.   Cardiovascular:      Rate and Rhythm: Normal rate and regular rhythm.      Pulses: Normal pulses.   Pulmonary:      Effort: Pulmonary effort is normal. No respiratory distress.   Abdominal:      General: There is no distension.      Palpations: Abdomen is soft.      Tenderness: There is no abdominal tenderness.   Musculoskeletal:      Right lower leg: No edema.      Left lower leg: No edema.   Skin:     General: Skin is warm and dry.   Neurological:      Mental Status: She is alert.         Vital Signs  ED Triage Vitals   Temperature Pulse Respirations Blood Pressure SpO2   04/25/24 1105 04/25/24 1103 04/25/24 1103 04/25/24 1103 04/25/24 1103   97.8 °F (36.6 °C) 71 18 104/53 100 %      Temp Source Heart Rate Source Patient Position - Orthostatic VS BP Location FiO2 (%)   04/25/24 1105 04/25/24 1415 04/25/24 1415 04/25/24 1415 --   Temporal Monitor Lying Left arm       Pain Score       04/25/24 1415       No Pain           Vitals:    04/25/24 1103 04/25/24 1415   BP: 104/53 140/62   Pulse: 71 73   Patient Position - Orthostatic VS:  Lying         Visual Acuity      ED Medications  Medications   sodium chloride 0.9 % bolus 500 mL (0 mL  Intravenous Stopped 4/25/24 1335)       Diagnostic Studies  Results Reviewed       Procedure Component Value Units Date/Time    HS Troponin I 2hr [[id number]]  (Normal) Collected: 04/25/24 1416    Lab Status: Final result Specimen: Blood from Arm, Left Updated: 04/25/24 1448     hs TnI 2hr 6 ng/L      Delta 2hr hsTnI 0 ng/L     Urine Microscopic [[id number]]  (Normal) Collected: 04/25/24 1134    Lab Status: Final result Specimen: Urine, Clean Catch Updated: 04/25/24 1158     RBC, UA None Seen /hpf      WBC, UA 0-1 /hpf      Epithelial Cells Occasional /hpf      Bacteria, UA None Seen /hpf     UA w Reflex to Microscopic w Reflex to Culture [[id number]]  (Abnormal) Collected: 04/25/24 1134    Lab Status: Final result Specimen: Urine, Clean Catch Updated: 04/25/24 1154     Color, UA Yellow     Clarity, UA Clear     Specific Gravity, UA 1.025     pH, UA 5.0     Leukocytes, UA Trace     Nitrite, UA Negative     Protein, UA Negative mg/dl      Glucose, UA Negative mg/dl      Ketones, UA Negative mg/dl      Urobilinogen, UA <2.0 mg/dl      Bilirubin, UA Negative     Occult Blood, UA Negative    HS Troponin 0hr (reflex protocol) [[id number]]  (Normal) Collected: 04/25/24 1109    Lab Status: Final result Specimen: Blood from Arm, Left Updated: 04/25/24 1137     hs TnI 0hr 6 ng/L     Comprehensive metabolic panel [[id number]]  (Abnormal) Collected: 04/25/24 1109    Lab Status: Final result Specimen: Blood from Arm, Left Updated: 04/25/24 1133     Sodium 133 mmol/L      Potassium 4.2 mmol/L      Chloride 99 mmol/L      CO2 26 mmol/L      ANION GAP 8 mmol/L      BUN 26 mg/dL      Creatinine 1.02 mg/dL      Glucose 103 mg/dL      Calcium 9.1 mg/dL      AST 24 U/L      ALT 17 U/L      Alkaline Phosphatase 42 U/L      Total Protein 6.7 g/dL      Albumin 4.2 g/dL      Total Bilirubin 0.68 mg/dL      eGFR 48 ml/min/1.73sq m     Narrative:      National Kidney Disease Foundation guidelines for Chronic Kidney Disease (CKD):     Stage 1  with normal or high GFR (GFR > 90 mL/min/1.73 square meters)    Stage 2 Mild CKD (GFR = 60-89 mL/min/1.73 square meters)    Stage 3A Moderate CKD (GFR = 45-59 mL/min/1.73 square meters)    Stage 3B Moderate CKD (GFR = 30-44 mL/min/1.73 square meters)    Stage 4 Severe CKD (GFR = 15-29 mL/min/1.73 square meters)    Stage 5 End Stage CKD (GFR <15 mL/min/1.73 square meters)  Note: GFR calculation is accurate only with a steady state creatinine    Lipase [[id number]]  (Normal) Collected: 04/25/24 1109    Lab Status: Final result Specimen: Blood from Arm, Left Updated: 04/25/24 1133     Lipase 23 u/L     CBC and differential [[id number]]  (Abnormal) Collected: 04/25/24 1109    Lab Status: Final result Specimen: Blood from Arm, Left Updated: 04/25/24 1115     WBC 6.87 Thousand/uL      RBC 3.92 Million/uL      Hemoglobin 12.2 g/dL      Hematocrit 36.5 %      MCV 93 fL      MCH 31.1 pg      MCHC 33.4 g/dL      RDW 12.4 %      MPV 8.3 fL      Platelets 184 Thousands/uL      nRBC 0 /100 WBCs      Segmented % 70 %      Immature Grans % 1 %      Lymphocytes % 19 %      Monocytes % 9 %      Eosinophils Relative 0 %      Basophils Relative 1 %      Absolute Neutrophils 4.89 Thousands/µL      Absolute Immature Grans 0.05 Thousand/uL      Absolute Lymphocytes 1.28 Thousands/µL      Absolute Monocytes 0.59 Thousand/µL      Eosinophils Absolute 0.02 Thousand/µL      Basophils Absolute 0.04 Thousands/µL                    XR chest 1 view portable   ED Interpretation by Opal Shetty MD (04/25 6883)   No acute pulmonary pathology                 Procedures  ECG 12 Lead Documentation Only    Date/Time: 4/25/2024 11:10 AM    Performed by: Opal Shetty MD  Authorized by: Opal Shetty MD    Indications / Diagnosis:  Intermittent lightheadedness  ECG reviewed by me, the ED Provider: yes    Patient location:  ED  Previous ECG:     Previous ECG:  Unavailable  Interpretation:     Interpretation: abnormal     Rate:     ECG rate:  68    ECG rate assessment: normal    Rhythm:     Rhythm: sinus rhythm    Ectopy:     Ectopy: none    QRS:     QRS axis:  Normal    QRS intervals:  Normal  Conduction:     Conduction: abnormal      Abnormal conduction: complete RBBB and 1st degree    ST segments:     ST segments:  Normal  T waves:     T waves: inverted      Inverted:  V2           ED Course             HEART Risk Score      Flowsheet Row Most Recent Value   Heart Score Risk Calculator    History 0  [no chest pain] Filed at: 04/25/2024 1303   ECG 1 Filed at: 04/25/2024 1303   Age 2 Filed at: 04/25/2024 1303   Risk Factors 0 Filed at: 04/25/2024 1303   Troponin 0 Filed at: 04/25/2024 1303   HEART Score 3 Filed at: 04/25/2024 1303                                        Medical Decision Making  89 year old female presents for evaluation of hyponatremia, intermittent lightheadedness, exertional dyspnea and increasing confusion.  EKG with 1st degree AV block and RBBB noted to be present on prior EKG on 9/20/22.  No acute ischemic changes or arrhythmia.  HEART score 3.  Repeat sodium 133 in the ED today.  Suspect significant water intake prior to labs yesterday likely contributed to her hyponatremia.  PCP follow up.  Return precautions provided.    Amount and/or Complexity of Data Reviewed  Labs: ordered.  Radiology: ordered and independent interpretation performed.             Disposition  Final diagnoses:   Intermittent lightheadedness   Hyponatremia   Exertional dyspnea     Time reflects when diagnosis was documented in both MDM as applicable and the Disposition within this note       Time User Action Codes Description Comment    4/25/2024  2:56 PM Opal Shetty [R42] Intermittent lightheadedness     4/25/2024  2:56 PM Opal Shetty Add [E87.1] Hyponatremia     4/25/2024  2:57 PM Opal Sehtty Add [R06.09] Exertional dyspnea           ED Disposition       ED Disposition   Discharge    Condition   Stable     Date/Time   Thu Apr 25, 2024 1457    Comment   Aide Yanes discharge to home/self care.                   Follow-up Information       Follow up With Specialties Details Why Contact Info Additional Information    MAXINE Hall Nurse Practitioner Schedule an appointment as soon as possible for a visit in 4 days for re-evaluation [address] [place] [address]  [phone]        [facility] Emergency Department Emergency Medicine Go to  If symptoms worsen [address] [facility] [address] [phone] [facility] Emergency Department, [address] [place], [place] [address]            Patient's Medications   Discharge Prescriptions    No medications on file       No discharge procedures on file.    PDMP Review       None            ED Provider  Electronically Signed by             Opal Shetty MD  04/25/24 2353

## 2024-04-29 ENCOUNTER — HOSPITAL ENCOUNTER (OUTPATIENT)
Dept: RADIOLOGY | Facility: CLINIC | Age: 89
Discharge: HOME | End: 2024-04-29
Payer: COMMERCIAL

## 2024-04-29 VITALS
HEART RATE: 57 BPM | RESPIRATION RATE: 16 BRPM | SYSTOLIC BLOOD PRESSURE: 138 MMHG | DIASTOLIC BLOOD PRESSURE: 63 MMHG | OXYGEN SATURATION: 92 %

## 2024-04-29 DIAGNOSIS — M47.812 ARTHROPATHY OF CERVICAL FACET JOINT: ICD-10-CM

## 2024-04-29 PROCEDURE — 64490 INJ PARAVERT F JNT C/T 1 LEV: CPT | Performed by: ANESTHESIOLOGY

## 2024-04-29 PROCEDURE — 64491 INJ PARAVERT F JNT C/T 2 LEV: CPT | Performed by: ANESTHESIOLOGY

## 2024-04-29 RX ADMIN — LIDOCAINE HYDROCHLORIDE 3 ML: 20 INJECTION, SOLUTION EPIDURAL; INFILTRATION; INTRACAUDAL at 13:48

## 2024-04-29 NOTE — DISCHARGE INSTRUCTIONS

## 2024-04-29 NOTE — H&P
History of Present Illness: The patient is a 89 y.o. female who presents with complaints of neck pain.    Past Medical History:   Diagnosis Date    Anxiety     Disease of thyroid gland        Past Surgical History:   Procedure Laterality Date    SHOULDER SURGERY           Current Outpatient Medications:     b complex vitamins tablet, Take 1 tablet by mouth daily (Patient not taking: Reported on 11/29/2023), Disp: , Rfl:     gabapentin (NEURONTIN) 100 mg capsule, Take 1 capsule (100 mg total) by mouth daily at bedtime (Patient not taking: Reported on 4/25/2024), Disp: 30 capsule, Rfl: 0    levothyroxine 25 mcg tablet, Take 25 mcg by mouth daily, Disp: , Rfl:     lidocaine (Lidoderm) 5 %, Apply 1 patch topically daily for 5 days Remove & Discard patch within 12 hours or as directed by MD, Disp: 5 patch, Rfl: 0    Multiple Vitamins-Minerals (CENTRUM SILVER PO), Take by mouth, Disp: , Rfl:     primidone (MYSOLINE) 50 mg tablet, Take by mouth 4 (four) times a day (Patient not taking: Reported on 11/29/2023), Disp: , Rfl:     temazepam (RESTORIL) 15 mg capsule, Take 15 mg by mouth daily at bedtime as needed for sleep (Patient not taking: Reported on 11/29/2023), Disp: , Rfl:     Turmeric (QC TUMERIC COMPLEX PO), Take by mouth, Disp: , Rfl:     Current Facility-Administered Medications:     lidocaine (PF) (XYLOCAINE-MPF) 2 % injection 3 mL, 3 mL, Perineural, Once, Abel Garrett,     Allergies   Allergen Reactions    Hylan G-F 20 Other (See Comments)    Influenza Virus Vaccine Other (See Comments)       Physical Exam:   General: Awake, Alert, Oriented x 3, Mood and affect appropriate  Respiratory: Respirations even and unlabored  Cardiovascular: Peripheral pulses intact; no edema  Musculoskeletal Exam: Pain with cervical extension    ASA Score: II         Assessment:   1. Arthropathy of cervical facet joint        Plan: right C4,5,6 MBB with 2% lidocaine

## 2024-05-15 ENCOUNTER — TELEPHONE (OUTPATIENT)
Dept: CARDIOLOGY CLINIC | Facility: CLINIC | Age: 89
End: 2024-05-15

## 2024-05-15 NOTE — TELEPHONE ENCOUNTER
Pt needs a prior authorization for ECHO,     Request is scanned into media and is attached to this encounter.

## 2024-05-17 ENCOUNTER — HOSPITAL ENCOUNTER (INPATIENT)
Facility: HOSPITAL | Age: 89
LOS: 2 days | Discharge: HOME | DRG: 312 | End: 2024-05-20
Attending: EMERGENCY MEDICINE | Admitting: STUDENT IN AN ORGANIZED HEALTH CARE EDUCATION/TRAINING PROGRAM
Payer: COMMERCIAL

## 2024-05-17 ENCOUNTER — APPOINTMENT (EMERGENCY)
Dept: CT IMAGING | Facility: HOSPITAL | Age: 89
DRG: 312 | End: 2024-05-17
Payer: COMMERCIAL

## 2024-05-17 ENCOUNTER — APPOINTMENT (EMERGENCY)
Dept: RADIOLOGY | Facility: HOSPITAL | Age: 89
DRG: 312 | End: 2024-05-17
Payer: COMMERCIAL

## 2024-05-17 DIAGNOSIS — R51.9 HEADACHE: ICD-10-CM

## 2024-05-17 DIAGNOSIS — R40.4 UNRESPONSIVE EPISODE: Primary | ICD-10-CM

## 2024-05-17 DIAGNOSIS — R06.09 DOE (DYSPNEA ON EXERTION): ICD-10-CM

## 2024-05-17 DIAGNOSIS — H53.8 BLURRY VISION, BILATERAL: ICD-10-CM

## 2024-05-17 DIAGNOSIS — R55 SYNCOPE, UNSPECIFIED SYNCOPE TYPE: ICD-10-CM

## 2024-05-17 LAB
ALBUMIN SERPL BCP-MCNC: 4.1 G/DL (ref 3.5–5)
ALP SERPL-CCNC: 49 U/L (ref 34–104)
ALT SERPL W P-5'-P-CCNC: 15 U/L (ref 7–52)
ANION GAP SERPL CALCULATED.3IONS-SCNC: 6 MMOL/L (ref 4–13)
APTT PPP: 31 SECONDS (ref 23–37)
AST SERPL W P-5'-P-CCNC: 22 U/L (ref 13–39)
BASOPHILS # BLD AUTO: 0.05 THOUSANDS/ÂΜL (ref 0–0.1)
BASOPHILS NFR BLD AUTO: 1 % (ref 0–1)
BILIRUB SERPL-MCNC: 0.55 MG/DL (ref 0.2–1)
BUN SERPL-MCNC: 26 MG/DL (ref 5–25)
CALCIUM SERPL-MCNC: 9.3 MG/DL (ref 8.4–10.2)
CARDIAC TROPONIN I PNL SERPL HS: 6 NG/L
CHLORIDE SERPL-SCNC: 100 MMOL/L (ref 96–108)
CO2 SERPL-SCNC: 28 MMOL/L (ref 21–32)
CREAT SERPL-MCNC: 0.88 MG/DL (ref 0.6–1.3)
EOSINOPHIL # BLD AUTO: 0.06 THOUSAND/ÂΜL (ref 0–0.61)
EOSINOPHIL NFR BLD AUTO: 1 % (ref 0–6)
ERYTHROCYTE [DISTWIDTH] IN BLOOD BY AUTOMATED COUNT: 12.7 % (ref 11.6–15.1)
FLUAV RNA RESP QL NAA+PROBE: NEGATIVE
FLUBV RNA RESP QL NAA+PROBE: NEGATIVE
GFR SERPL CREATININE-BSD FRML MDRD: 58 ML/MIN/1.73SQ M
GLUCOSE SERPL-MCNC: 84 MG/DL (ref 65–140)
HCT VFR BLD AUTO: 35.5 % (ref 34.8–46.1)
HGB BLD-MCNC: 11.8 G/DL (ref 11.5–15.4)
IMM GRANULOCYTES # BLD AUTO: 0.06 THOUSAND/UL (ref 0–0.2)
IMM GRANULOCYTES NFR BLD AUTO: 1 % (ref 0–2)
INR PPP: 1.03 (ref 0.84–1.19)
LYMPHOCYTES # BLD AUTO: 1.06 THOUSANDS/ÂΜL (ref 0.6–4.47)
LYMPHOCYTES NFR BLD AUTO: 14 % (ref 14–44)
MCH RBC QN AUTO: 30.6 PG (ref 26.8–34.3)
MCHC RBC AUTO-ENTMCNC: 33.2 G/DL (ref 31.4–37.4)
MCV RBC AUTO: 92 FL (ref 82–98)
MONOCYTES # BLD AUTO: 0.73 THOUSAND/ÂΜL (ref 0.17–1.22)
MONOCYTES NFR BLD AUTO: 9 % (ref 4–12)
NEUTROPHILS # BLD AUTO: 5.86 THOUSANDS/ÂΜL (ref 1.85–7.62)
NEUTS SEG NFR BLD AUTO: 74 % (ref 43–75)
NRBC BLD AUTO-RTO: 0 /100 WBCS
PLATELET # BLD AUTO: 198 THOUSANDS/UL (ref 149–390)
PMV BLD AUTO: 8.2 FL (ref 8.9–12.7)
POTASSIUM SERPL-SCNC: 3.7 MMOL/L (ref 3.5–5.3)
PROT SERPL-MCNC: 6.6 G/DL (ref 6.4–8.4)
PROTHROMBIN TIME: 13.9 SECONDS (ref 11.6–14.5)
RBC # BLD AUTO: 3.85 MILLION/UL (ref 3.81–5.12)
RSV RNA RESP QL NAA+PROBE: NEGATIVE
SARS-COV-2 RNA RESP QL NAA+PROBE: NEGATIVE
SODIUM SERPL-SCNC: 134 MMOL/L (ref 135–147)
WBC # BLD AUTO: 7.82 THOUSAND/UL (ref 4.31–10.16)

## 2024-05-17 PROCEDURE — 85025 COMPLETE CBC W/AUTO DIFF WBC: CPT | Performed by: EMERGENCY MEDICINE

## 2024-05-17 PROCEDURE — 36415 COLL VENOUS BLD VENIPUNCTURE: CPT | Performed by: EMERGENCY MEDICINE

## 2024-05-17 PROCEDURE — 99285 EMERGENCY DEPT VISIT HI MDM: CPT | Performed by: EMERGENCY MEDICINE

## 2024-05-17 PROCEDURE — 80053 COMPREHEN METABOLIC PANEL: CPT | Performed by: EMERGENCY MEDICINE

## 2024-05-17 PROCEDURE — 70450 CT HEAD/BRAIN W/O DYE: CPT

## 2024-05-17 PROCEDURE — 85730 THROMBOPLASTIN TIME PARTIAL: CPT | Performed by: EMERGENCY MEDICINE

## 2024-05-17 PROCEDURE — 71046 X-RAY EXAM CHEST 2 VIEWS: CPT

## 2024-05-17 PROCEDURE — 84484 ASSAY OF TROPONIN QUANT: CPT | Performed by: EMERGENCY MEDICINE

## 2024-05-17 PROCEDURE — 99284 EMERGENCY DEPT VISIT MOD MDM: CPT

## 2024-05-17 PROCEDURE — 85610 PROTHROMBIN TIME: CPT | Performed by: EMERGENCY MEDICINE

## 2024-05-17 PROCEDURE — 0241U HB NFCT DS VIR RESP RNA 4 TRGT: CPT | Performed by: EMERGENCY MEDICINE

## 2024-05-17 PROCEDURE — 93005 ELECTROCARDIOGRAM TRACING: CPT

## 2024-05-17 RX ORDER — ACETAMINOPHEN 325 MG/1
650 TABLET ORAL ONCE
Status: COMPLETED | OUTPATIENT
Start: 2024-05-17 | End: 2024-05-17

## 2024-05-17 RX ADMIN — ACETAMINOPHEN 650 MG: 325 TABLET, FILM COATED ORAL at 23:24

## 2024-05-18 PROBLEM — R06.09 DOE (DYSPNEA ON EXERTION): Status: ACTIVE | Noted: 2024-05-18

## 2024-05-18 PROBLEM — R55 SYNCOPE: Status: ACTIVE | Noted: 2024-05-18

## 2024-05-18 PROBLEM — E03.9 HYPOTHYROIDISM: Status: ACTIVE | Noted: 2024-05-18

## 2024-05-18 LAB
2HR DELTA HS TROPONIN: 0 NG/L
ANION GAP SERPL CALCULATED.3IONS-SCNC: 7 MMOL/L (ref 4–13)
BUN SERPL-MCNC: 29 MG/DL (ref 5–25)
CALCIUM SERPL-MCNC: 9.5 MG/DL (ref 8.4–10.2)
CARDIAC TROPONIN I PNL SERPL HS: 6 NG/L
CHLORIDE SERPL-SCNC: 101 MMOL/L (ref 96–108)
CO2 SERPL-SCNC: 26 MMOL/L (ref 21–32)
CREAT SERPL-MCNC: 0.86 MG/DL (ref 0.6–1.3)
ERYTHROCYTE [DISTWIDTH] IN BLOOD BY AUTOMATED COUNT: 12.6 % (ref 11.6–15.1)
GFR SERPL CREATININE-BSD FRML MDRD: 60 ML/MIN/1.73SQ M
GLUCOSE SERPL-MCNC: 95 MG/DL (ref 65–140)
HCT VFR BLD AUTO: 34.2 % (ref 34.8–46.1)
HGB BLD-MCNC: 11.2 G/DL (ref 11.5–15.4)
MAGNESIUM SERPL-MCNC: 2 MG/DL (ref 1.9–2.7)
MCH RBC QN AUTO: 30.4 PG (ref 26.8–34.3)
MCHC RBC AUTO-ENTMCNC: 32.7 G/DL (ref 31.4–37.4)
MCV RBC AUTO: 93 FL (ref 82–98)
PHOSPHATE SERPL-MCNC: 3.9 MG/DL (ref 2.3–4.1)
PLATELET # BLD AUTO: 204 THOUSANDS/UL (ref 149–390)
PMV BLD AUTO: 8.3 FL (ref 8.9–12.7)
POTASSIUM SERPL-SCNC: 3.9 MMOL/L (ref 3.5–5.3)
RBC # BLD AUTO: 3.68 MILLION/UL (ref 3.81–5.12)
SODIUM SERPL-SCNC: 134 MMOL/L (ref 135–147)
WBC # BLD AUTO: 7.9 THOUSAND/UL (ref 4.31–10.16)

## 2024-05-18 PROCEDURE — 84484 ASSAY OF TROPONIN QUANT: CPT | Performed by: EMERGENCY MEDICINE

## 2024-05-18 PROCEDURE — 99223 1ST HOSP IP/OBS HIGH 75: CPT | Performed by: STUDENT IN AN ORGANIZED HEALTH CARE EDUCATION/TRAINING PROGRAM

## 2024-05-18 PROCEDURE — 83735 ASSAY OF MAGNESIUM: CPT | Performed by: STUDENT IN AN ORGANIZED HEALTH CARE EDUCATION/TRAINING PROGRAM

## 2024-05-18 PROCEDURE — 99222 1ST HOSP IP/OBS MODERATE 55: CPT | Performed by: INTERNAL MEDICINE

## 2024-05-18 PROCEDURE — 85027 COMPLETE CBC AUTOMATED: CPT | Performed by: STUDENT IN AN ORGANIZED HEALTH CARE EDUCATION/TRAINING PROGRAM

## 2024-05-18 PROCEDURE — 36415 COLL VENOUS BLD VENIPUNCTURE: CPT | Performed by: EMERGENCY MEDICINE

## 2024-05-18 PROCEDURE — 80048 BASIC METABOLIC PNL TOTAL CA: CPT | Performed by: STUDENT IN AN ORGANIZED HEALTH CARE EDUCATION/TRAINING PROGRAM

## 2024-05-18 PROCEDURE — 84100 ASSAY OF PHOSPHORUS: CPT | Performed by: STUDENT IN AN ORGANIZED HEALTH CARE EDUCATION/TRAINING PROGRAM

## 2024-05-18 RX ORDER — SODIUM CHLORIDE, SODIUM GLUCONATE, SODIUM ACETATE, POTASSIUM CHLORIDE, MAGNESIUM CHLORIDE, SODIUM PHOSPHATE, DIBASIC, AND POTASSIUM PHOSPHATE .53; .5; .37; .037; .03; .012; .00082 G/100ML; G/100ML; G/100ML; G/100ML; G/100ML; G/100ML; G/100ML
75 INJECTION, SOLUTION INTRAVENOUS CONTINUOUS
Status: DISCONTINUED | OUTPATIENT
Start: 2024-05-18 | End: 2024-05-18

## 2024-05-18 RX ORDER — LEVOTHYROXINE SODIUM 0.03 MG/1
25 TABLET ORAL
Status: DISCONTINUED | OUTPATIENT
Start: 2024-05-18 | End: 2024-05-20 | Stop reason: HOSPADM

## 2024-05-18 RX ORDER — ACETAMINOPHEN 325 MG/1
650 TABLET ORAL EVERY 6 HOURS PRN
Status: DISCONTINUED | OUTPATIENT
Start: 2024-05-18 | End: 2024-05-20 | Stop reason: HOSPADM

## 2024-05-18 RX ORDER — MAGNESIUM HYDROXIDE/ALUMINUM HYDROXICE/SIMETHICONE 120; 1200; 1200 MG/30ML; MG/30ML; MG/30ML
30 SUSPENSION ORAL EVERY 6 HOURS PRN
Status: DISCONTINUED | OUTPATIENT
Start: 2024-05-18 | End: 2024-05-20 | Stop reason: HOSPADM

## 2024-05-18 RX ORDER — ONDANSETRON 2 MG/ML
4 INJECTION INTRAMUSCULAR; INTRAVENOUS EVERY 6 HOURS PRN
Status: DISCONTINUED | OUTPATIENT
Start: 2024-05-18 | End: 2024-05-20 | Stop reason: HOSPADM

## 2024-05-18 RX ORDER — SENNOSIDES 8.6 MG
1 TABLET ORAL
Status: DISCONTINUED | OUTPATIENT
Start: 2024-05-18 | End: 2024-05-20 | Stop reason: HOSPADM

## 2024-05-18 RX ORDER — ENOXAPARIN SODIUM 100 MG/ML
30 INJECTION SUBCUTANEOUS DAILY
Status: DISCONTINUED | OUTPATIENT
Start: 2024-05-18 | End: 2024-05-20 | Stop reason: HOSPADM

## 2024-05-18 RX ADMIN — ACETAMINOPHEN 650 MG: 325 TABLET, FILM COATED ORAL at 21:36

## 2024-05-18 RX ADMIN — SODIUM CHLORIDE, SODIUM GLUCONATE, SODIUM ACETATE, POTASSIUM CHLORIDE, MAGNESIUM CHLORIDE, SODIUM PHOSPHATE, DIBASIC, AND POTASSIUM PHOSPHATE 75 ML/HR: .53; .5; .37; .037; .03; .012; .00082 INJECTION, SOLUTION INTRAVENOUS at 02:32

## 2024-05-18 RX ADMIN — LEVOTHYROXINE SODIUM 25 MCG: 25 TABLET ORAL at 05:25

## 2024-05-18 RX ADMIN — ENOXAPARIN SODIUM 30 MG: 30 INJECTION SUBCUTANEOUS at 07:50

## 2024-05-18 NOTE — UTILIZATION REVIEW
"Initial Clinical Review    Admission: Date/Time/Statement:   Admission Orders (From admission, onward)       Ordered        05/18/24 1726  INPATIENT ADMISSION  Once            05/18/24 0031  Place in Observation  Once                          Orders Placed This Encounter   Procedures    Place in Observation     Standing Status:   Standing     Number of Occurrences:   1     Order Specific Question:   Level of Care     Answer:   Med Surg [16]    INPATIENT ADMISSION     Standing Status:   Standing     Number of Occurrences:   1     Order Specific Question:   Level of Care     Answer:   Med Surg [16]     Order Specific Question:   Estimated length of stay     Answer:   More than 2 Midnights     Order Specific Question:   Certification     Answer:   I certify that inpatient services are medically necessary for this patient for a duration of greater than two midnights. See H&P and MD Progress Notes for additional information about the patient's course of treatment.     ED Arrival Information       Expected   -    Arrival   5/17/2024 20:48    Acuity   Urgent              Means of arrival   Ambulance    Escorted by   [facility]    Service   Hospitalist    Admission type   Emergency              Arrival complaint   dizziness             Chief Complaint   Patient presents with    Blurred Vision       Initial Presentation: 89 y.o. female  to ER from restaurant via EMS:  describes it as her vision going blurry and feeling near syncopal while eating in restaurant -  witness described event as  pt \"went gray and lost her color her eyes got wide and she was unresponsive for 45 seconds \"  Reports intermittent dyspnea on exertion with stairs over the last month -  Scheduled for outpatient echo next month    5/17   IN ER,  c/o mild frontal HA.  CTh unremarkable, labs unrevealing.  EXAM -  oriented, no focal deficits.  Ambulates steadily and independently   normal breath sounds.  EKG - nsr / normal QTC    5/18      Admit  OBS " Status,  MS  Level of care  for  syncopal workup.   Cont telemetry,  ck echo. Consult cardiology .  Scd's b/l LE.   Orthostatic bp q shift;  I/O q shift;  OOB as tolerated; reg diet     Date: 5/18      Day 2: CHANGED to INPATIENT   STATUS  ,  MS  level of care   Anticipated Length of Stay/Certification Statement:   >2 midnights     5/18  CARDIOLOGY CONSULT:   etiology is unclear but concerned about cardiac etiology as patient does have conduction abnormality on her EKG.  continue telemetry monitoring to assess for evidence of sinus pause or AV block. Likely would benefit from extended monitoring as an outpatient. Will check an echocardiogram to assess LV function and valvular structure and function         ED Triage Vitals   Temperature Pulse Respirations Blood Pressure SpO2   05/17/24 2052 05/17/24 2052 05/17/24 2052 05/17/24 2052 05/17/24 2052   97.8 °F (36.6 °C) 71 18 124/58 100 %      Temp Source Heart Rate Source Patient Position - Orthostatic VS BP Location FiO2 (%)   05/17/24 2052 05/17/24 2052 05/17/24 2300 05/17/24 2330 --   Temporal Monitor Sitting Right arm       Pain Score       05/17/24 2324       No Pain          Wt Readings from Last 1 Encounters:   05/18/24 47.3 kg (104 lb 3.2 oz)     Additional Vital Signs:   05/18/24 15:16:10 98.1 °F (36.7 °C) 65 16 149/71 97 97 % None (Room air) Lying   05/18/24 07:08:45 97.6 °F (36.4 °C) 57 18 106/68 81 97 % None (Room air) Lying   05/18/24 0103 -- -- -- -- -- -- None (Room air) --   05/18/24 0100 -- -- -- -- -- -- None (Room air) --   05/18/24 0000 -- 64 18 118/56 81 95 % None (Room air) Sitting   05/17/24 2330 -- 66 18 133/63 91 93 % None (Room air) Sitting   05/17/24 2300 -- 78 17 125/62 87 96 % None (Room air) Sitting     Pertinent Labs/Diagnostic Test Results:     5/17  EKG   SR ,  wide QRS  (RBBB)        CT head without contrast   Final Result by Yariel Morrison MD (05/18 0005)      Stable mild senescent changes without acute intracranial abnormalities.                Workstation performed: [id number]         XR chest 2 views   Final Result by Airam Crow MD (05/18 1021)      No acute cardiopulmonary disease.               Workstation performed: [id number]           Results from last 7 days   Lab Units 05/17/24 2206   SARS-COV-2  Negative     Results from last 7 days   Lab Units 05/18/24  0209 05/17/24  2206   WBC Thousand/uL 7.90 7.82   HEMOGLOBIN g/dL 11.2* 11.8   HEMATOCRIT % 34.2* 35.5   PLATELETS Thousands/uL 204 198   TOTAL NEUT ABS Thousands/µL  --  5.86         Results from last 7 days   Lab Units 05/18/24  0209 05/17/24  2206   SODIUM mmol/L 134* 134*   POTASSIUM mmol/L 3.9 3.7   CHLORIDE mmol/L 101 100   CO2 mmol/L 26 28   ANION GAP mmol/L 7 6   BUN mg/dL 29* 26*   CREATININE mg/dL 0.86 0.88   EGFR ml/min/1.73sq m 60 58   CALCIUM mg/dL 9.5 9.3   MAGNESIUM mg/dL 2.0  --    PHOSPHORUS mg/dL 3.9  --      Results from last 7 days   Lab Units 05/17/24  2206   AST U/L 22   ALT U/L 15   ALK PHOS U/L 49   TOTAL PROTEIN g/dL 6.6   ALBUMIN g/dL 4.1   TOTAL BILIRUBIN mg/dL 0.55         Results from last 7 days   Lab Units 05/18/24  0209 05/17/24  2206   GLUCOSE RANDOM mg/dL 95 84     Results from last 7 days   Lab Units 05/18/24  0033 05/17/24  2206   HS TNI 0HR ng/L  --  6   HS TNI 2HR ng/L 6  --    HSTNI D2 ng/L 0  --          Results from last 7 days   Lab Units 05/17/24  2206   PROTIME seconds 13.9   INR  1.03   PTT seconds 31         Results from last 7 days   Lab Units 05/17/24  2206   INFLUENZA A PCR  Negative   INFLUENZA B PCR  Negative   RSV PCR  Negative   ED Treatment:   Medication Administration from 05/17/2024 2048 to 05/18/2024 0058         Date/Time Order Dose Route Action     05/17/2024 2324 EDT acetaminophen (TYLENOL) tablet 650 mg 650 mg Oral Given          Past Medical History:   Diagnosis Date    Anxiety     Disease of thyroid gland    Admitting Diagnosis: Dizziness [R42]  Unresponsive episode [R40.4]  Blurry vision, bilateral  [H53.8]  Headache [R51.9]  Age/Sex: 89 y.o. female  Admission Orders:   see above   Scheduled Medications:  enoxaparin, 30 mg, Subcutaneous, Daily  levothyroxine, 25 mcg, Oral, Early Morning      Continuous IV Infusions:     PRN Meds:  acetaminophen, 650 mg, Oral, Q6H PRN  aluminum-magnesium hydroxide-simethicone, 30 mL, Oral, Q6H PRN  ondansetron, 4 mg, Intravenous, Q6H PRN  senna, 1 tablet, Oral, HS PRN        IP CONSULT TO CARDIOLOGY    Network Utilization Review Department  ATTENTION: Please call with any questions or concerns to [phone] and carefully listen to the prompts so that you are directed to the right person. All voicemails are confidential.   For Discharge needs, contact Care Management DC Support Team at [phone] opt. 2  Send all requests for admission clinical reviews, approved or denied determinations and any other requests to dedicated fax number below belonging to the campus where the patient is receiving treatment. List of dedicated fax numbers for the Facilities:  FACILITY NAME UR FAX NUMBER   ADMISSION DENIALS (Administrative/Medical Necessity) [phone]   DISCHARGE SUPPORT TEAM (NETWORK) [phone]   PARENT CHILD HEALTH (Maternity/NICU/Pediatrics) [phone]   [facility] [phone]   [facility] [phone]   [facility] [phone]   [facility] [phone]   [facility] [phone]   [facility] [phone]   [facility] [phone]   [facility] [phone]   [facility] [phone]   [facility] [phone]   [facility] [phone]   [facility] [phone]

## 2024-05-18 NOTE — ASSESSMENT & PLAN NOTE
Was sitting eating with a friend when she suddenly experienced blurred vision and then her color changed to gray and she became unresponsive for 45 seconds, no seizure-like activity witnessed  CTh unremarkable, labs unrevealing  Concern for cardiac etiology, monitor on telemetry  Check echo  If any abnormality and above-consult cardiology

## 2024-05-18 NOTE — ASSESSMENT & PLAN NOTE
Was sitting eating with a friend when she suddenly experienced blurred vision and then her color changed to gray and she became unresponsive for 45 seconds, no seizure-like activity witnessed  CTh unremarkable, labs unrevealing  Concern for cardiac etiology, monitor on telemetry  Discussed with cardio continue to monitor on tele and obtain echo  Pt was orthostatic will give x1 albumin and order compression socks

## 2024-05-18 NOTE — CONSULTS
Consultation - Cardiology   Aide Yanes 89 y.o. female MRN: [id number]  Unit/Bed#: -01 Encounter: [id number]      Assessment:  Principal Problem:    Syncope  Active Problems:    Hypothyroidism    CHICAS (dyspnea on exertion)      Plan:  89-year-old presenting with episode of syncope.  Etiology is unclear but concerned about cardiac etiology as patient does have conduction abnormality on her EKG.  Favor continue telemetry monitoring to assess for evidence of sinus pause or AV block.  Patient on no rate control therapy.  Likely would benefit from extended monitoring as an outpatient.  Will check an echocardiogram to assess LV function and valvular structure and function.  Will follow patient with you.  Thank you for this consultation.    History of Present Illness   Physician Requesting Consult: Neetu Cannon MD  Reason for Consult / Principal Problem: Syncope  HPI: Aide Yanes is a 89 y.o. year old female who presents with syncope.  Patient was eating with a friend when her vision suddenly became blurry.  She went on to become unresponsive per witness for about 45 seconds.  She has had no issues since.  Pharmacologic nuclear stress test 2020 demonstrated no evidence of ischemia.  EKG on presentation demonstrated NSR with first-degree AVB and RBBB with no significant change compared to prior tracing except [place] interval is longer now.  Troponins were negative.  Head CT demonstrated no acute disease.  Chest x-ray with no acute disease.    Consults    Review of Systems:  Review of Systems - Negative     Historical Information   Past Medical History:   Diagnosis Date    Anxiety     Disease of thyroid gland      Past Surgical History:   Procedure Laterality Date     SECTION      SHOULDER SURGERY       Social History     Substance and Sexual Activity   Alcohol Use Not Currently     Social History     Substance and Sexual Activity   Drug Use Never     Social History     Tobacco Use   Smoking Status  "Never   Smokeless Tobacco Never     Family History: non-contributory    Meds/Allergies   all current active meds have been reviewed  Allergies   Allergen Reactions    Hylan G-F 20 Other (See Comments)    Influenza Virus Vaccine Other (See Comments)       Objective   Vitals: Blood pressure 106/68, pulse 57, temperature 97.6 °F (36.4 °C), temperature source Oral, resp. rate 18, height 5' 4\" (1.626 m), weight 47.3 kg (104 lb 3.2 oz), SpO2 97%., Body mass index is 17.89 kg/m².,   Orthostatic Blood Pressures      Flowsheet Row Most Recent Value   Blood Pressure 106/68 filed at 05/18/2024 0708   Patient Position - Orthostatic VS Lying filed at 05/18/2024 0708              Intake/Output Summary (Last 24 hours) at 5/18/2024 1052  Last data filed at 5/18/2024 0232  Gross per 24 hour   Intake 240 ml   Output --   Net 240 ml       Invasive Devices       Peripheral Intravenous Line  Duration             Peripheral IV 05/18/24 Right;Ventral (anterior) Forearm <1 day                    Physical Exam  Vitals reviewed.   Constitutional:       Appearance: She is well-developed.   HENT:      Head: Normocephalic and atraumatic.   Eyes:      Conjunctiva/sclera: Conjunctivae normal.      Pupils: Pupils are equal, round, and reactive to light.   Cardiovascular:      Rate and Rhythm: Normal rate.      Heart sounds: Normal heart sounds.   Pulmonary:      Effort: Pulmonary effort is normal.      Breath sounds: Normal breath sounds.   Abdominal:      General: Bowel sounds are normal.      Palpations: Abdomen is soft.   Skin:     General: Skin is warm and dry.   Neurological:      Mental Status: She is alert and oriented to person, place, and time.         Lab Results:   Admission on 05/17/2024   Component Date Value    Ventricular Rate 05/17/2024 67     Atrial Rate 05/17/2024 67     HI Interval 05/17/2024 270     QRSD Interval 05/17/2024 126     QT Interval 05/17/2024 430     QTC Interval 05/17/2024 454     P Axis 05/17/2024 97     QRS Axis " 05/17/2024 70     T Wave [place] 05/17/2024 66     WBC 05/17/2024 7.82     RBC 05/17/2024 3.85     Hemoglobin 05/17/2024 11.8     Hematocrit 05/17/2024 35.5     MCV 05/17/2024 92     MCH 05/17/2024 30.6     MCHC 05/17/2024 33.2     RDW 05/17/2024 12.7     MPV 05/17/2024 8.2 (L)     Platelets 05/17/2024 198     nRBC 05/17/2024 0     Segmented % 05/17/2024 74     Immature Grans % 05/17/2024 1     Lymphocytes % 05/17/2024 14     Monocytes % 05/17/2024 9     Eosinophils Relative 05/17/2024 1     Basophils Relative 05/17/2024 1     Absolute Neutrophils 05/17/2024 5.86     Absolute Immature Grans 05/17/2024 0.06     Absolute Lymphocytes 05/17/2024 1.06     Absolute Monocytes 05/17/2024 0.73     Eosinophils Absolute 05/17/2024 0.06     Basophils Absolute 05/17/2024 0.05     Sodium 05/17/2024 134 (L)     Potassium 05/17/2024 3.7     Chloride 05/17/2024 100     CO2 05/17/2024 28     ANION GAP 05/17/2024 6     BUN 05/17/2024 26 (H)     Creatinine 05/17/2024 0.88     Glucose 05/17/2024 84     Calcium 05/17/2024 9.3     AST 05/17/2024 22     ALT 05/17/2024 15     Alkaline Phosphatase 05/17/2024 49     Total Protein 05/17/2024 6.6     Albumin 05/17/2024 4.1     Total Bilirubin 05/17/2024 0.55     eGFR 05/17/2024 58     Protime 05/17/2024 13.9     INR 05/17/2024 1.03     PTT 05/17/2024 31     hs TnI 0hr 05/17/2024 6     SARS-CoV-2 05/17/2024 Negative     INFLUENZA A PCR 05/17/2024 Negative     INFLUENZA B PCR 05/17/2024 Negative     RSV PCR 05/17/2024 Negative     hs TnI 2hr 05/18/2024 6     Delta 2hr hsTnI 05/18/2024 0     Sodium 05/18/2024 134 (L)     Potassium 05/18/2024 3.9     Chloride 05/18/2024 101     CO2 05/18/2024 26     ANION GAP 05/18/2024 7     BUN 05/18/2024 29 (H)     Creatinine 05/18/2024 0.86     Glucose 05/18/2024 95     Calcium 05/18/2024 9.5     eGFR 05/18/2024 60     WBC 05/18/2024 7.90     RBC 05/18/2024 3.68 (L)     Hemoglobin 05/18/2024 11.2 (L)     Hematocrit 05/18/2024 34.2 (L)     MCV 05/18/2024 93     MCH  05/18/2024 30.4     MCHC 05/18/2024 32.7     RDW 05/18/2024 12.6     Platelets 05/18/2024 204     MPV 05/18/2024 8.3 (L)     Magnesium 05/18/2024 2.0     Phosphorus 05/18/2024 3.9        Imaging: I have personally reviewed pertinent reports.    XR chest 2 views    Result Date: 5/18/2024  Narrative: XR CHEST PA & LATERAL DUAL ENERGY SUBTRACTION. INDICATION:  syncope. COMPARISON: CXR 4/25/2024 and 9/20/2022. FINDINGS: Clear lungs. No pneumothorax or pleural effusion. Calcified breast implants. Unremarkable cardiomediastinal silhouette. Bones are unremarkable for age. Reverse right shoulder arthroplasty. Unremarkable upper abdomen.     Impression: No acute cardiopulmonary disease. Workstation performed: [id number]     CT head without contrast    Result Date: 5/18/2024  Narrative: CT BRAIN - WITHOUT CONTRAST INDICATION:   unresponsive episode. COMPARISON: 4/20/2021. TECHNIQUE:  CT examination of the brain was performed.  Multiplanar 2D reformatted images were created from the source data. Radiation dose length product (DLP) for this visit:  806.73 mGy-cm .  This examination, like all CT scans performed in the [facility], was performed utilizing techniques to minimize radiation dose exposure, including the use of iterative  reconstruction and automated exposure control. IMAGE QUALITY:  Diagnostic. FINDINGS: PARENCHYMA: Decreased attenuation is noted in periventricular and subcortical white matter demonstrating an appearance that is statistically most likely to represent mild microangiopathic change; this appearance is similar when compared to most recent prior examination. No CT signs of acute infarction.  No intracranial mass, mass effect or midline shift.  No acute parenchymal hemorrhage. VENTRICLES AND EXTRA-AXIAL SPACES: Mild diffuse central and bifrontal predominant cortical volume loss/atrophy is again seen. The cerebral sulci and cisterns are otherwise normal in size and configuration for  chronological age.. VISUALIZED ORBITS: Normal visualized orbits. PARANASAL SINUSES: Normal visualized paranasal sinuses. CALVARIUM AND EXTRACRANIAL SOFT TISSUES: Unremarkable.     Impression: Stable mild senescent changes without acute intracranial abnormalities. Workstation performed: [id number]     FL spine and pain procedure    Result Date: 4/29/2024  Narrative: Procedure: Diagnostic Cervical Medial Branch Block of right C4, C5, and C6. Medical Necessity: Intractable, refractory, neck pain. Diagnosis: Cervical spondylosis and neck pain Indication for Fluoroscopy: This procedure requires accurate needle placement that can not be performed percutaneously without fluoroscopic guidance. Procedure Note: After fully informed written consent was obtained and procedure risks reviewed with the patient, the patient was brought into the fluroscopy suit Room and placed prone on the fluoroscopy table. A Chloraprep was performed and the area surrounded by sterile drapes. Fluoroscopy unit was positioned over the patient and images of the spine (AP views) obtained. The entry sites for the above noted levels median branch were determined.. At each level a 25 gauge 3.5 inch spinal needle was placed at the level of the median branch to the facet under fluoroscopic guidance. This was performed by determining needle position based on aligning the needle point with the waist of each cervical vertebrae under tunnel vision. A dose of 2% lidocaine was administered at each level. The needles at each level were withdrawn following administration of the medication. There was no significant bleeding at the site. The needle was then withdrawn, the neck cleansed, and a band-aid placed. Complications: None. Disposition: Vital signs stable. Motor function was intact. The discharge instruction sheet was given to the patient. The patient was discharged home with a  and with instructions to call immediately if there are any complications.  Patient was given a pain diary to determine if pain is facet in origin. Once the diary is returned we will consider next appropriate course of treatment. Estimated blood loss: Minimal No specimens were taken     XR chest 1 view portable    Result Date: 4/25/2024  Narrative: XR CHEST PORTABLE INDICATION: exertional dyspnea. COMPARISON: Chest radiograph dated 9/20/2022. FINDINGS: No focal airspace consolidation, pleural effusion, signs of congestion, or pneumothorax. Cardiomediastinal silhouette is unremarkable. Left shoulder prosthesis again noted.     Impression: No acute cardiopulmonary disease. Workstation performed: [id number]       EKG: Personally reviewed by Rickey Perez MD     Counseling / Coordination of Care  Total floor / unit time spent today 30 minutes.  Greater than 50% of total time was spent with the patient and / or family counseling and / or coordination of care.

## 2024-05-18 NOTE — ASSESSMENT & PLAN NOTE
Reports intermittent dyspnea on exertion with stairs over the last month  Scheduled for outpatient echo next month, will obtain while inpatient  Exam is unremarkable on admit  Echo pending

## 2024-05-18 NOTE — H&P
"[facility]  H&P  Name: Aide Yanes 89 y.o. female I MRN: [id number]  Unit/Bed#: -01 I Date of Admission: 5/17/2024   Date of Service: 5/18/2024 I Hospital Day: 0      Assessment & Plan   * Syncope  Assessment & Plan  Was sitting eating with a friend when she suddenly experienced blurred vision and then her color changed to gray and she became unresponsive for 45 seconds, no seizure-like activity witnessed  CTh unremarkable, labs unrevealing  Concern for cardiac etiology, monitor on telemetry  Check echo  If any abnormality and above-consult cardiology    CHICAS (dyspnea on exertion)  Assessment & Plan  Reports intermittent dyspnea on exertion with stairs over the last month  Scheduled for outpatient echo next month, will obtain while inpatient  Exam is unremarkable on admit    Hypothyroidism  Assessment & Plan  Continue home Synthroid 25 mcg daily           VTE Pharmacologic Prophylaxis: VTE Score: 3 Moderate Risk (Score 3-4) - Pharmacological DVT Prophylaxis Ordered: enoxaparin (Lovenox).  Code Status: Level 1 - Full Code   Discussion with family: Patient declined call to .     Anticipated Length of Stay: Patient will be admitted on an observation basis with an anticipated length of stay of less than 2 midnights secondary to syncope.    Chief Complaint: \" I guess I passed out\"    History of Present Illness:  Aide Yanes is a 89 y.o. female with a PMH of hypothyroidism who presents with syncope just prior to arrival to the ED.  Patient reports she was sitting eating with her friend when her vision suddenly became very blurry.  Denies any associated lightheadedness, chest pain, or palpitations.  Reports she felt dyspneic, however had attributed this to feeling anxious about her vision being blurry.  Per ED documentation, friend reported that patient's color then became gray and she became unresponsive for 45 seconds.  Developed mild frontal headache after coming " to, this resolved with Tylenol in the ED.  She also endorses occasional dyspnea on exertion over the last 1 month.  Does admit to occasional chest tightness, which she describes as occurring on the right side of her chest when her right arm (that she states is her bad arm) feels sore or tight as well.  No abdominal pain, nausea, vomiting, or recent illness otherwise.    Review of Systems:  Review of Systems   Constitutional:  Negative for fever.   HENT:  Negative for congestion.    Eyes:  Positive for visual disturbance.   Respiratory:  Positive for chest tightness and shortness of breath. Negative for cough.    Cardiovascular:  Negative for palpitations and leg swelling.   Gastrointestinal:  Negative for abdominal pain, constipation, diarrhea, nausea and vomiting.   Genitourinary:  Negative for difficulty urinating.   Musculoskeletal:  Negative for gait problem.   Neurological:  Positive for syncope. Negative for speech difficulty, weakness and light-headedness.   All other systems reviewed and are negative.      Past Medical and Surgical History:   Past Medical History:   Diagnosis Date    Anxiety     Disease of thyroid gland        Past Surgical History:   Procedure Laterality Date     SECTION      SHOULDER SURGERY         Meds/Allergies:  Prior to Admission medications    Medication Sig Start Date End Date Taking? Authorizing Provider   levothyroxine 25 mcg tablet Take 25 mcg by mouth daily   Yes Historical Provider, MD   Multiple Vitamins-Minerals (CENTRUM SILVER PO) Take by mouth   Yes Historical Provider, MD   Turmeric (QC TUMERIC COMPLEX PO) Take by mouth   Yes Historical Provider, MD   b complex vitamins tablet Take 1 tablet by mouth daily  Patient not taking: Reported on 2023  Historical Provider, MD   gabapentin (NEURONTIN) 100 mg capsule Take 1 capsule (100 mg total) by mouth daily at bedtime  Patient not taking: Reported on 2024  MAXINE Marcos  "  lidocaine (Lidoderm) 5 % Apply 1 patch topically daily for 5 days Remove & Discard patch within 12 hours or as directed by MD 10/3/21 5/18/24  Marylin Green PA-C   primidone (MYSOLINE) 50 mg tablet Take by mouth 4 (four) times a day  Patient not taking: Reported on 11/29/2023 5/18/24  Historical Provider, MD   temazepam (RESTORIL) 15 mg capsule Take 15 mg by mouth daily at bedtime as needed for sleep  Patient not taking: Reported on 11/29/2023 5/18/24  Historical Provider, MD HERNANDEZ have reviewed home medications with patient personally.    Allergies:   Allergies   Allergen Reactions    Hylan G-F 20 Other (See Comments)    Influenza Virus Vaccine Other (See Comments)       Social History:  Marital Status:    Occupation: Retired  Patient Pre-hospital Living Situation: Home, Alone  Patient Pre-hospital Level of Mobility: walks  Patient Pre-hospital Diet Restrictions: None  Substance Use History:   Social History     Substance and Sexual Activity   Alcohol Use Not Currently     Social History     Tobacco Use   Smoking Status Never   Smokeless Tobacco Never     Social History     Substance and Sexual Activity   Drug Use Never       Family History:  History reviewed. No pertinent family history.    Physical Exam:     Vitals:   Blood Pressure: 118/56 (05/18/24 0000)  Pulse: 64 (05/18/24 0000)  Temperature: 97.8 °F (36.6 °C) (05/17/24 2052)  Temp Source: Temporal (05/17/24 2052)  Respirations: 18 (05/18/24 0000)  Height: 5' 4\" (162.6 cm) (05/18/24 0100)  Weight - Scale: 47.3 kg (104 lb 3.2 oz) (05/18/24 0100)  SpO2: 95 % (05/18/24 0000)    Physical Exam  Vitals and nursing note reviewed.   Constitutional:       General: She is not in acute distress.     Appearance: Normal appearance. She is not ill-appearing.      Comments: Pleasant and conversational   HENT:      Head: Normocephalic.      Nose: Nose normal.      Mouth/Throat:      Mouth: Mucous membranes are moist.   Eyes:      Conjunctiva/sclera: " Conjunctivae normal.   Cardiovascular:      Rate and Rhythm: Normal rate and regular rhythm.      Pulses: Normal pulses.      Heart sounds: No murmur heard.  Pulmonary:      Effort: Pulmonary effort is normal. No respiratory distress.      Breath sounds: Normal breath sounds. No wheezing, rhonchi or rales.   Abdominal:      General: Abdomen is flat.      Palpations: Abdomen is soft.      Tenderness: There is no abdominal tenderness. There is no guarding or rebound.   Musculoskeletal:         General: Normal range of motion.      Cervical back: Normal range of motion.      Right lower leg: No edema.      Left lower leg: No edema.   Skin:     General: Skin is warm and dry.      Coloration: Skin is not pale.   Neurological:      General: No focal deficit present.      Mental Status: She is alert.      Comments: Ambulates steadily and independently   Psychiatric:         Mood and Affect: Mood normal.         Thought Content: Thought content normal.          Additional Data:     Lab Results:  Results from last 7 days   Lab Units 05/17/24  2206   WBC Thousand/uL 7.82   HEMOGLOBIN g/dL 11.8   HEMATOCRIT % 35.5   PLATELETS Thousands/uL 198   SEGS PCT % 74   LYMPHO PCT % 14   MONO PCT % 9   EOS PCT % 1     Results from last 7 days   Lab Units 05/17/24  2206   SODIUM mmol/L 134*   POTASSIUM mmol/L 3.7   CHLORIDE mmol/L 100   CO2 mmol/L 28   BUN mg/dL 26*   CREATININE mg/dL 0.88   ANION GAP mmol/L 6   CALCIUM mg/dL 9.3   ALBUMIN g/dL 4.1   TOTAL BILIRUBIN mg/dL 0.55   ALK PHOS U/L 49   ALT U/L 15   AST U/L 22   GLUCOSE RANDOM mg/dL 84     Results from last 7 days   Lab Units 05/17/24  2206   INR  1.03                   Lines/Drains:  Invasive Devices       Peripheral Intravenous Line  Duration             Peripheral IV 05/17/24 Left Antecubital <1 day                        Imaging: Reviewed radiology reports from this admission including: CT head  CT head without contrast   Final Result by Yariel Morrison MD (05/18 0005)       Stable mild senescent changes without acute intracranial abnormalities.               Workstation performed: [id number]         XR chest 2 views    (Results Pending)       EKG and Other Studies Reviewed on Admission:   EKG:  Personal interpretation -sinus with nonspecific T wave changes.  Normal QTc..    ** Please Note: This note has been constructed using a voice recognition system. **

## 2024-05-18 NOTE — ASSESSMENT & PLAN NOTE
Reports intermittent dyspnea on exertion with stairs over the last month  Scheduled for outpatient echo next month, will obtain while inpatient  Exam is unremarkable on admit

## 2024-05-18 NOTE — PLAN OF CARE
Problem: Potential for Falls  Goal: Patient will remain free of falls  Description: INTERVENTIONS:  - Educate patient/family on patient safety including physical limitations  - Instruct patient to call for assistance with activity   - Consult OT/PT to assist with strengthening/mobility   - Keep Call bell within reach  - Keep bed low and locked with side rails adjusted as appropriate  - Keep care items and personal belongings within reach  - Initiate and maintain comfort rounds  - Make Fall Risk Sign visible to staff  - Offer Toileting every 2 Hours, in advance of need  - Initiate/Maintain bed alarm  - Obtain necessary fall risk management equipment: non slip socks  - Apply yellow socks and bracelet for high fall risk patients  - Consider moving patient to room near nurses station  Outcome: Progressing     Problem: PAIN - ADULT  Goal: Verbalizes/displays adequate comfort level or baseline comfort level  Description: Interventions:  - Encourage patient to monitor pain and request assistance  - Assess pain using appropriate pain scale  - Administer analgesics based on type and severity of pain and evaluate response  - Implement non-pharmacological measures as appropriate and evaluate response  - Consider cultural and social influences on pain and pain management  - Notify physician/advanced practitioner if interventions unsuccessful or patient reports new pain  Outcome: Progressing     Problem: INFECTION - ADULT  Goal: Absence or prevention of progression during hospitalization  Description: INTERVENTIONS:  - Assess and monitor for signs and symptoms of infection  - Monitor lab/diagnostic results  - Monitor all insertion sites, i.e. indwelling lines, tubes, and drains  - Monitor endotracheal if appropriate and nasal secretions for changes in amount and color  - [place] appropriate cooling/warming therapies per order  - Administer medications as ordered  - Instruct and encourage patient and family to use good hand  hygiene technique  - Identify and instruct in appropriate isolation precautions for identified infection/condition  Outcome: Progressing  Goal: Absence of fever/infection during neutropenic period  Description: INTERVENTIONS:  - Monitor WBC    Outcome: Progressing     Problem: SAFETY ADULT  Goal: Patient will remain free of falls  Description: INTERVENTIONS:  - Educate patient/family on patient safety including physical limitations  - Instruct patient to call for assistance with activity   - Consult OT/PT to assist with strengthening/mobility   - Keep Call bell within reach  - Keep bed low and locked with side rails adjusted as appropriate  - Keep care items and personal belongings within reach  - Initiate and maintain comfort rounds  - Make Fall Risk Sign visible to staff  - Offer Toileting every 2 Hours, in advance of need  - Initiate/Maintain bed alarm  - Obtain necessary fall risk management equipment: non slip socks  - Apply yellow socks and bracelet for high fall risk patients  - Consider moving patient to room near nurses station  Outcome: Progressing  Goal: Maintain or return to baseline ADL function  Description: INTERVENTIONS:  -  Assess patient's ability to carry out ADLs; assess patient's baseline for ADL function and identify physical deficits which impact ability to perform ADLs (bathing, care of mouth/teeth, toileting, grooming, dressing, etc.)  - Assess/evaluate cause of self-care deficits   - Assess range of motion  - Assess patient's mobility; develop plan if impaired  - Assess patient's need for assistive devices and provide as appropriate  - Encourage maximum independence but intervene and supervise when necessary  - Involve family in performance of ADLs  - Assess for home care needs following discharge   - Consider OT consult to assist with ADL evaluation and planning for discharge  - Provide patient education as appropriate  Outcome: Progressing  Goal: Maintains/Returns to pre admission  functional level  Description: INTERVENTIONS:  - Perform AM-PAC 6 Click Basic Mobility/ Daily Activity assessment daily.  - Set and communicate daily mobility goal to care team and patient/family/caregiver.   - Collaborate with rehabilitation services on mobility goals if consulted  - Perform Range of Motion 5 times a day.  - Reposition patient every 2 hours.  - Dangle patient 3 times a day  - Stand patient 3 times a day  - Ambulate patient 3 times a day  - Out of bed to chair 3 times a day   - Out of bed for meals 3 times a day  - Out of bed for toileting  - Record patient progress and toleration of activity level   Outcome: Progressing     Problem: DISCHARGE PLANNING  Goal: Discharge to home or other facility with appropriate resources  Description: INTERVENTIONS:  - Identify barriers to discharge w/patient and caregiver  - Arrange for needed discharge resources and transportation as appropriate  - Identify discharge learning needs (meds, wound care, etc.)  - Arrange for interpretive services to assist at discharge as needed  - Refer to Case Management Department for coordinating discharge planning if the patient needs post-hospital services based on physician/advanced practitioner order or complex needs related to functional status, cognitive ability, or social support system  Outcome: Progressing     Problem: Knowledge Deficit  Goal: Patient/family/caregiver demonstrates understanding of disease process, treatment plan, medications, and discharge instructions  Description: Complete learning assessment and assess knowledge base.  Interventions:  - Provide teaching at level of understanding  - Provide teaching via preferred learning methods  Outcome: Progressing     Problem: NEUROSENSORY - ADULT  Goal: Achieves stable or improved neurological status  Description: INTERVENTIONS  - Monitor and report changes in neurological status  - Monitor vital signs such as temperature, blood pressure, glucose, and any other  labs ordered   - Initiate measures to prevent increased intracranial pressure  - Monitor for seizure activity and implement precautions if appropriate      Outcome: Progressing  Goal: Achieves maximal functionality and self care  Description: INTERVENTIONS  - Monitor swallowing and airway patency with patient fatigue and changes in neurological status  - Encourage and assist patient to increase activity and self care.   - Encourage visually impaired, hearing impaired and aphasic patients to use assistive/communication devices  Outcome: Progressing     Problem: CARDIOVASCULAR - ADULT  Goal: Maintains optimal cardiac output and hemodynamic stability  Description: INTERVENTIONS:  - Monitor I/O, vital signs and rhythm  - Monitor for S/S and trends of decreased cardiac output  - Administer and titrate ordered vasoactive medications to optimize hemodynamic stability  - Assess quality of pulses, skin color and temperature  - Assess for signs of decreased coronary artery perfusion  - Instruct patient to report change in severity of symptoms  Outcome: Progressing  Goal: Absence of cardiac dysrhythmias or at baseline rhythm  Description: INTERVENTIONS:  - Continuous cardiac monitoring, vital signs, obtain 12 lead EKG if ordered  - Administer antiarrhythmic and heart rate control medications as ordered  - Monitor electrolytes and administer replacement therapy as ordered  Outcome: Progressing     Problem: METABOLIC, FLUID AND ELECTROLYTES - ADULT  Goal: Electrolytes maintained within normal limits  Description: INTERVENTIONS:  - Monitor labs and assess patient for signs and symptoms of electrolyte imbalances  - Administer electrolyte replacement as ordered  - Monitor response to electrolyte replacements, including repeat lab results as appropriate  - Instruct patient on fluid and nutrition as appropriate  Outcome: Progressing  Goal: Fluid balance maintained  Description: INTERVENTIONS:  - Monitor labs   - Monitor I/O and WT  -  Instruct patient on fluid and nutrition as appropriate  - Assess for signs & symptoms of volume excess or deficit  Outcome: Progressing     Problem: MUSCULOSKELETAL - ADULT  Goal: Maintain or return mobility to safest level of function  Description: INTERVENTIONS:  - Assess patient's ability to carry out ADLs; assess patient's baseline for ADL function and identify physical deficits which impact ability to perform ADLs (bathing, care of mouth/teeth, toileting, grooming, dressing, etc.)  - Assess/evaluate cause of self-care deficits   - Assess range of motion  - Assess patient's mobility  - Assess patient's need for assistive devices and provide as appropriate  - Encourage maximum independence but intervene and supervise when necessary  - Involve family in performance of ADLs  - Assess for home care needs following discharge   - Consider OT consult to assist with ADL evaluation and planning for discharge  - Provide patient education as appropriate  Outcome: Progressing  Goal: Maintain proper alignment of affected body part  Description: INTERVENTIONS:  - Support, maintain and protect limb and body alignment  - Provide patient/ family with appropriate education  Outcome: Progressing     Problem: Nutrition/Hydration-ADULT  Goal: Nutrient/Hydration intake appropriate for improving, restoring or maintaining nutritional needs  Description: Monitor and assess patient's nutrition/hydration status for malnutrition. Collaborate with interdisciplinary team and initiate plan and interventions as ordered.  Monitor patient's weight and dietary intake as ordered or per policy. Utilize nutrition screening tool and intervene as necessary. Determine patient's food preferences and provide high-protein, high-caloric foods as appropriate.     INTERVENTIONS:  - Monitor oral intake, urinary output, labs, and treatment plans  - Assess nutrition and hydration status and recommend course of action  - Evaluate amount of meals eaten  - Assist  patient with eating if necessary   - Allow adequate time for meals  - Recommend/ encourage appropriate diets, oral nutritional supplements, and vitamin/mineral supplements  - Order, calculate, and assess calorie counts as needed  - Recommend, monitor, and adjust tube feedings and TPN/PPN based on assessed needs  - Assess need for intravenous fluids  - Provide specific nutrition/hydration education as appropriate  - Include patient/family/caregiver in decisions related to nutrition  Outcome: Progressing

## 2024-05-18 NOTE — PLAN OF CARE
Problem: Potential for Falls  Goal: Patient will remain free of falls  Description: INTERVENTIONS:  - Educate patient/family on patient safety including physical limitations  - Instruct patient to call for assistance with activity   - Consult OT/PT to assist with strengthening/mobility   - Keep Call bell within reach  - Keep bed low and locked with side rails adjusted as appropriate  - Keep care items and personal belongings within reach  - Initiate and maintain comfort rounds  - Make Fall Risk Sign visible to staff  - Offer Toileting every 2 Hours, in advance of need  - Initiate/Maintain bedalarm  - Obtain necessary fall risk management equipment: socks  - Apply yellow socks and bracelet for high fall risk patients  - Consider moving patient to room near nurses station  Outcome: Progressing     Problem: SAFETY ADULT  Goal: Patient will remain free of falls  Description: INTERVENTIONS:  - Educate patient/family on patient safety including physical limitations  - Instruct patient to call for assistance with activity   - Consult OT/PT to assist with strengthening/mobility   - Keep Call bell within reach  - Keep bed low and locked with side rails adjusted as appropriate  - Keep care items and personal belongings within reach  - Initiate and maintain comfort rounds  - Make Fall Risk Sign visible to staff  - Offer Toileting every 2 Hours, in advance of need  - Initiate/Maintain bed alarm  - Obtain necessary fall risk management equipment: socks  - Apply yellow socks and bracelet for high fall risk patients  - Consider moving patient to room near nurses station  Outcome: Progressing  Goal: Maintain or return to baseline ADL function  Description: INTERVENTIONS:  -  Assess patient's ability to carry out ADLs; assess patient's baseline for ADL function and identify physical deficits which impact ability to perform ADLs (bathing, care of mouth/teeth, toileting, grooming, dressing, etc.)  - Assess/evaluate cause of  self-care deficits   - Assess range of motion  - Assess patient's mobility; develop plan if impaired  - Assess patient's need for assistive devices and provide as appropriate  - Encourage maximum independence but intervene and supervise when necessary  - Involve family in performance of ADLs  - Assess for home care needs following discharge   - Consider OT consult to assist with ADL evaluation and planning for discharge  - Provide patient education as appropriate  Outcome: Progressing  Goal: Maintains/Returns to pre admission functional level  Description: INTERVENTIONS:  - Perform AM-PAC 6 Click Basic Mobility/ Daily Activity assessment daily.  - Set and communicate daily mobility goal to care team and patient/family/caregiver.   - Collaborate with rehabilitation services on mobility goals if consulted  - Perform Range of Motion 3 times a day.  - Reposition patient every 3 hours.  - Dangle patient 3  Problem: CARDIOVASCULAR - ADULT  Goal: Maintains optimal cardiac output and hemodynamic stability  Description: INTERVENTIONS:  - Monitor I/O, vital signs and rhythm  - Monitor for S/S and trends of decreased cardiac output  - Administer and titrate ordered vasoactive medications to optimize hemodynamic stability  - Assess quality of pulses, skin color and temperature  - Assess for signs of decreased coronary artery perfusion  - Instruct patient to report change in severity of symptoms  Outcome: Progressing  Goal: Absence of cardiac dysrhythmias or at baseline rhythm  Description: INTERVENTIONS:  - Continuous cardiac monitoring, vital signs, obtain 12 lead EKG if ordered  - Administer antiarrhythmic and heart rate control medications as ordered  - Monitor electrolytes and administer replacement therapy as ordered  Outcome: Progressing    times a day  - Stand patient 3 times a day  - Ambulate patient 3 times a day  - Out of bed to chair 3 times a day   - Out of bed for meals 3 times a day  - Out of bed for toileting  -  Record patient progress and toleration of activity level   Outcome: Progressing

## 2024-05-18 NOTE — ED PROVIDER NOTES
"History  Chief Complaint   Patient presents with    Blurred Vision     89-year-old female who was out to dinner this evening with her neighbor Trisha when she began not feeling well.  She describes it as her vision going blurry and feeling near syncopal.  Trisha told his daughter that she went gray and lost her color her eyes got wide and she was unresponsive for 45 seconds before coming to.  Patient states that she felt slightly short of breath, fatigued and gradually developed mild dull frontal headache.  She remains with mild dull frontal headache and mentions some chest tightness while I was in the room.  No arrhythmias noted on the monitor.       History provided by:  Patient (donna and RINA)   used: No    Syncope  Episode history:  Single  Most recent episode:  Today  Duration:  45 seconds  Timing:  Constant  Progression:  Resolved  Chronicity:  New  Context comment:  Was eating when she felt \"odd\"  - described as lightheaded with blurry vision and some SOB  - denies CP  Witnessed: yes (neighbor witnessed event)    Relieved by:  Nothing  Worsened by:  Nothing  Ineffective treatments:  None tried  Associated symptoms: headaches (mild frontal) and shortness of breath    Associated symptoms: no chest pain, no fever, no palpitations, no seizures and no vomiting        Prior to Admission Medications   Prescriptions Last Dose Informant Patient Reported? Taking?   Multiple Vitamins-Minerals (CENTRUM SILVER PO) 2024 Self Yes Yes   Sig: Take by mouth   Turmeric (QC TUMERIC COMPLEX PO) 2024 Self Yes Yes   Sig: Take by mouth   levothyroxine 25 mcg tablet 2024 Self Yes Yes   Sig: Take 25 mcg by mouth daily      Facility-Administered Medications: None       Past Medical History:   Diagnosis Date    Anxiety     Disease of thyroid gland        Past Surgical History:   Procedure Laterality Date     SECTION      SHOULDER SURGERY         History reviewed. No pertinent family history.  I " have reviewed and agree with the history as documented.    E-Cigarette/Vaping    E-Cigarette Use Never User      E-Cigarette/Vaping Substances    Nicotine No     THC No     CBD No     Flavoring No     Other No     Unknown No      Social History     Tobacco Use    Smoking status: Never    Smokeless tobacco: Never   Vaping Use    Vaping status: Never Used   Substance Use Topics    Alcohol use: Not Currently    Drug use: Never       Review of Systems   Constitutional:  Negative for chills and fever.   HENT:  Negative for ear pain and sore throat.    Eyes:  Positive for visual disturbance (blurry during event - now fine). Negative for pain.   Respiratory:  Positive for shortness of breath. Negative for cough.    Cardiovascular:  Positive for syncope. Negative for chest pain and palpitations.   Gastrointestinal:  Negative for abdominal pain and vomiting.   Genitourinary:  Negative for dysuria and hematuria.   Musculoskeletal:  Negative for arthralgias and back pain.   Skin:  Negative for color change and rash.   Neurological:  Positive for syncope and headaches (mild frontal). Negative for seizures.   All other systems reviewed and are negative.      Physical Exam  Physical Exam  Vitals and nursing note reviewed.   Constitutional:       General: She is not in acute distress.     Appearance: She is well-developed.   HENT:      Head: Normocephalic and atraumatic.      Mouth/Throat:      Mouth: Mucous membranes are moist.   Eyes:      Extraocular Movements: Extraocular movements intact.      Conjunctiva/sclera: Conjunctivae normal.      Pupils: Pupils are equal, round, and reactive to light.   Cardiovascular:      Rate and Rhythm: Normal rate and regular rhythm.      Heart sounds: No murmur heard.  Pulmonary:      Effort: Pulmonary effort is normal. No respiratory distress.      Breath sounds: Normal breath sounds.   Abdominal:      Palpations: Abdomen is soft.      Tenderness: There is no abdominal tenderness.    Musculoskeletal:         General: No swelling.      Cervical back: Neck supple.   Skin:     General: Skin is warm and dry.      Capillary Refill: Capillary refill takes less than 2 seconds.   Neurological:      General: No focal deficit present.      Mental Status: She is alert and oriented to person, place, and time. Mental status is at baseline.      Cranial Nerves: No cranial nerve deficit.      Sensory: No sensory deficit.   Psychiatric:         Mood and Affect: Mood normal.         Behavior: Behavior normal.         Vital Signs  ED Triage Vitals   Temperature Pulse Respirations Blood Pressure SpO2   05/17/24 2052 05/17/24 2052 05/17/24 2052 05/17/24 2052 05/17/24 2052   97.8 °F (36.6 °C) 71 18 124/58 100 %      Temp Source Heart Rate Source Patient Position - Orthostatic VS BP Location FiO2 (%)   05/17/24 2052 05/17/24 2052 05/17/24 2300 05/17/24 2330 --   Temporal Monitor Sitting Right arm       Pain Score       05/17/24 2324       No Pain           Vitals:    05/17/24 2055 05/17/24 2300 05/17/24 2330 05/18/24 0000   BP:  125/62 133/63 118/56   Pulse: 67 78 66 64   Patient Position - Orthostatic VS:  Sitting Sitting Sitting         Visual Acuity  Visual Acuity      Flowsheet Row Most Recent Value   L Pupil Size (mm) 3   R Pupil Size (mm) 3            ED Medications  Medications   levothyroxine tablet 25 mcg (has no administration in time range)   acetaminophen (TYLENOL) tablet 650 mg (has no administration in time range)   senna (SENOKOT) tablet 8.6 mg (has no administration in time range)   ondansetron (ZOFRAN) injection 4 mg (has no administration in time range)   aluminum-magnesium hydroxide-simethicone (MAALOX) oral suspension 30 mL (has no administration in time range)   enoxaparin (LOVENOX) subcutaneous injection 30 mg (has no administration in time range)   multi-electrolyte (PLASMALYTE-A/ISOLYTE-S PH 7.4) IV solution (75 mL/hr Intravenous New Bag 5/18/24 0232)   acetaminophen (TYLENOL) tablet 650 mg  (650 mg Oral Given 5/17/24 0712)       Diagnostic Studies  Results Reviewed       Procedure Component Value Units Date/Time    HS Troponin I 2hr [[id number]]  (Normal) Collected: 05/18/24 0033    Lab Status: Final result Specimen: Blood from Arm, Left Updated: 05/18/24 0104     hs TnI 2hr 6 ng/L      Delta 2hr hsTnI 0 ng/L     FLU/RSV/COVID - if FLU/RSV clinically relevant [[id number]]  (Normal) Collected: 05/17/24 2206    Lab Status: Final result Specimen: Nares from Nose Updated: 05/17/24 2249     SARS-CoV-2 Negative     INFLUENZA A PCR Negative     INFLUENZA B PCR Negative     RSV PCR Negative    Narrative:      FOR PEDIATRIC PATIENTS - copy/paste COVID Guidelines URL to browser: [email or web address]    SARS-CoV-2 assay is a Nucleic Acid Amplification assay intended for the  qualitative detection of nucleic acid from SARS-CoV-2 in nasopharyngeal  swabs. Results are for the presumptive identification of SARS-CoV-2 RNA.    Positive results are indicative of infection with SARS-CoV-2, the virus  causing COVID-19, but do not rule out bacterial infection or co-infection  with other viruses. Laboratories within the [place] and its  territories are required to report all positive results to the appropriate  public health authorities. Negative results do not preclude SARS-CoV-2  infection and should not be used as the sole basis for treatment or other  patient management decisions. Negative results must be combined with  clinical observations, patient history, and epidemiological information.  This test has not been FDA cleared or approved.    This test has been authorized by FDA under an Emergency Use Authorization  (EUA). This test is only authorized for the duration of time the  declaration that circumstances exist justifying the authorization of the  emergency use of an in vitro diagnostic tests for detection of SARS-CoV-2  virus and/or diagnosis of COVID-19  infection under section 564(b)(1) of  the Act, 21 U.S.C. 360bbb-3(b)(1), unless the authorization is terminated  or revoked sooner. The test has been validated but independent review by FDA  and CLIA is pending.    Test performed using "[employer]pert: This RT-PCR assay targets N2,  a region unique to SARS-CoV-2. A conserved region in the E-gene was chosen  for pan-Sarbecovirus detection which includes SARS-CoV-2.    According to CMS-2020-01-R, this platform meets the definition of high-throughput technology.    HS Troponin 0hr (reflex protocol) [[id number]]  (Normal) Collected: 05/17/24 2206    Lab Status: Final result Specimen: Blood from Arm, Left Updated: 05/17/24 2233     hs TnI 0hr 6 ng/L     Protime-INR [[id number]]  (Normal) Collected: 05/17/24 2206    Lab Status: Final result Specimen: Blood from Arm, Left Updated: 05/17/24 2226     Protime 13.9 seconds      INR 1.03    APTT [[id number]]  (Normal) Collected: 05/17/24 2206    Lab Status: Final result Specimen: Blood from Arm, Left Updated: 05/17/24 2226     PTT 31 seconds     Comprehensive metabolic panel [[id number]]  (Abnormal) Collected: 05/17/24 2206    Lab Status: Final result Specimen: Blood from Arm, Left Updated: 05/17/24 2226     Sodium 134 mmol/L      Potassium 3.7 mmol/L      Chloride 100 mmol/L      CO2 28 mmol/L      ANION GAP 6 mmol/L      BUN 26 mg/dL      Creatinine 0.88 mg/dL      Glucose 84 mg/dL      Calcium 9.3 mg/dL      AST 22 U/L      ALT 15 U/L      Alkaline Phosphatase 49 U/L      Total Protein 6.6 g/dL      Albumin 4.1 g/dL      Total Bilirubin 0.55 mg/dL      eGFR 58 ml/min/1.73sq m     Narrative:      National Kidney Disease Foundation guidelines for Chronic Kidney Disease (CKD):     Stage 1 with normal or high GFR (GFR > 90 mL/min/1.73 square meters)    Stage 2 Mild CKD (GFR = 60-89 mL/min/1.73 square meters)    Stage 3A Moderate CKD (GFR = 45-59 mL/min/1.73 square meters)    Stage 3B Moderate CKD (GFR = 30-44 mL/min/1.73 square  meters)    Stage 4 Severe CKD (GFR = 15-29 mL/min/1.73 square meters)    Stage 5 End Stage CKD (GFR <15 mL/min/1.73 square meters)  Note: GFR calculation is accurate only with a steady state creatinine    CBC and differential [[id number]]  (Abnormal) Collected: 05/17/24 2206    Lab Status: Final result Specimen: Blood from Arm, Left Updated: 05/17/24 2212     WBC 7.82 Thousand/uL      RBC 3.85 Million/uL      Hemoglobin 11.8 g/dL      Hematocrit 35.5 %      MCV 92 fL      MCH 30.6 pg      MCHC 33.2 g/dL      RDW 12.7 %      MPV 8.2 fL      Platelets 198 Thousands/uL      nRBC 0 /100 WBCs      Segmented % 74 %      Immature Grans % 1 %      Lymphocytes % 14 %      Monocytes % 9 %      Eosinophils Relative 1 %      Basophils Relative 1 %      Absolute Neutrophils 5.86 Thousands/µL      Absolute Immature Grans 0.06 Thousand/uL      Absolute Lymphocytes 1.06 Thousands/µL      Absolute Monocytes 0.73 Thousand/µL      Eosinophils Absolute 0.06 Thousand/µL      Basophils Absolute 0.05 Thousands/µL                    CT head without contrast   Final Result by Yariel Morrison MD (05/18 0005)      Stable mild senescent changes without acute intracranial abnormalities.               Workstation performed: [id number]         XR chest 2 views    (Results Pending)              Procedures  ECG 12 Lead Documentation Only    Date/Time: 5/17/2024 10:31 PM    Performed by: Danuta Loaiza DO  Authorized by: Danuta Loaiza DO    Patient location:  ED  Interpretation:     Interpretation: normal    Rate:     ECG rate:  67    ECG rate assessment: normal    Rhythm:     Rhythm: sinus rhythm    Ectopy:     Ectopy: none    QRS:     QRS axis:  Normal    QRS intervals:  Wide (RBBB)  Conduction:     Conduction: normal    ST segments:     ST segments:  Normal  T waves:     T waves: non-specific             ED Course  ED Course as of 05/18/24 0408   Fri May 17, 2024   2134 Pt seen and examined.  Patient is an 89-year-old female who was out  to dinner this evening with her neighbor Trisha when she began not feeling well.  She describes it as her vision going blurry and feeling near syncopal.  Trisha told his daughter that she went gray and lost her color her eyes got wide and she was unresponsive for 45 seconds before coming to.  Patient states that she felt slightly short of breath, fatigued and gradually developed mild dull frontal headache.  She remains with mild dull frontal headache and mentions some chest tightness while I was in the room.  No arrhythmias noted on the monitor.  Will check EKG, cardiac labs, chest x-ray, CT head and urinalysis and plan on admitting patient for observation for syncopal episode.   2241 CBC, CMP and Trop all within normal limits, reviewed findings with patient and family, patient being taken for CT head and chest x-ray.   2318 Chest x-ray negative for any acute findings.  Awaiting CT head results.   Sat May 18, 2024   0011 Stable mild senescent changes without acute intracranial abnormalities.                               SBIRT 20yo+      Flowsheet Row Most Recent Value   Initial Alcohol Screen: US AUDIT-C     1. How often do you have a drink containing alcohol? 0 Filed at: 05/17/2024 2325   2. How many drinks containing alcohol do you have on a typical day you are drinking?  0 Filed at: 05/17/2024 2325   3a. Male UNDER 65: How often do you have five or more drinks on one occasion? 0 Filed at: 05/17/2024 2325   3b. FEMALE Any Age, or MALE 65+: How often do you have 4 or more drinks on one occassion? 0 Filed at: 05/17/2024 2325   Audit-C Score 0 Filed at: 05/17/2024 2325   JIE: How many times in the past year have you...    Used an illegal drug or used a prescription medication for non-medical reasons? Never Filed at: 05/17/2024 2325                      Medical Decision Making  Amount and/or Complexity of Data Reviewed  Labs: ordered.  Radiology: ordered.    Risk  OTC drugs.  Decision regarding hospitalization.              Disposition  Final diagnoses:   Unresponsive episode   Blurry vision, bilateral   Headache     Time reflects when diagnosis was documented in both MDM as applicable and the Disposition within this note       Time User Action Codes Description Comment    5/18/2024 12:30 AM Danuta Loaiza [R40.4] Unresponsive episode     5/18/2024 12:30 AM Danuta Loaiza Add [H53.8] Blurry vision, bilateral     5/18/2024 12:31 AM Danuta Loaiza [R51.9] Headache           ED Disposition       ED Disposition   Admit    Condition   Stable    Date/Time   Sat May 18, 2024 0030    Comment   Case was discussed with Mena Bojorquez and the patient's admission status was agreed to be Admission Status: observation status to the service of Dr. Cannon .               Follow-up Information    None         Current Discharge Medication List        CONTINUE these medications which have NOT CHANGED    Details   levothyroxine 25 mcg tablet Take 25 mcg by mouth daily      Multiple Vitamins-Minerals (CENTRUM SILVER PO) Take by mouth      Turmeric (QC TUMERIC COMPLEX PO) Take by mouth             No discharge procedures on file.    PDMP Review       None            ED Provider  Electronically Signed by             Danuta Loaiza DO  05/18/24 0384

## 2024-05-19 PROBLEM — E03.8 OTHER SPECIFIED HYPOTHYROIDISM: Status: ACTIVE | Noted: 2024-05-18

## 2024-05-19 LAB
ANION GAP SERPL CALCULATED.3IONS-SCNC: 5 MMOL/L (ref 4–13)
ATRIAL RATE: 67 BPM
BUN SERPL-MCNC: 21 MG/DL (ref 5–25)
CALCIUM SERPL-MCNC: 8.5 MG/DL (ref 8.4–10.2)
CHLORIDE SERPL-SCNC: 108 MMOL/L (ref 96–108)
CO2 SERPL-SCNC: 26 MMOL/L (ref 21–32)
CREAT SERPL-MCNC: 0.81 MG/DL (ref 0.6–1.3)
GFR SERPL CREATININE-BSD FRML MDRD: 64 ML/MIN/1.73SQ M
GLUCOSE SERPL-MCNC: 81 MG/DL (ref 65–140)
P AXIS: 97 DEGREES
POTASSIUM SERPL-SCNC: 3.8 MMOL/L (ref 3.5–5.3)
PR INTERVAL: 270 MS
QRS AXIS: 70 DEGREES
QRSD INTERVAL: 126 MS
QT INTERVAL: 430 MS
QTC INTERVAL: 454 MS
SODIUM SERPL-SCNC: 139 MMOL/L (ref 135–147)
T WAVE AXIS: 66 DEGREES
VENTRICULAR RATE: 67 BPM

## 2024-05-19 PROCEDURE — 80048 BASIC METABOLIC PNL TOTAL CA: CPT | Performed by: STUDENT IN AN ORGANIZED HEALTH CARE EDUCATION/TRAINING PROGRAM

## 2024-05-19 PROCEDURE — 99233 SBSQ HOSP IP/OBS HIGH 50: CPT | Performed by: STUDENT IN AN ORGANIZED HEALTH CARE EDUCATION/TRAINING PROGRAM

## 2024-05-19 PROCEDURE — 93010 ELECTROCARDIOGRAM REPORT: CPT | Performed by: INTERNAL MEDICINE

## 2024-05-19 RX ORDER — ALBUMIN, HUMAN INJ 5% 5 %
12.5 SOLUTION INTRAVENOUS ONCE
Status: COMPLETED | OUTPATIENT
Start: 2024-05-19 | End: 2024-05-19

## 2024-05-19 RX ADMIN — ALBUMIN (HUMAN) 12.5 G: 12.5 INJECTION, SOLUTION INTRAVENOUS at 12:04

## 2024-05-19 RX ADMIN — ACETAMINOPHEN 650 MG: 325 TABLET, FILM COATED ORAL at 22:38

## 2024-05-19 RX ADMIN — ENOXAPARIN SODIUM 30 MG: 30 INJECTION SUBCUTANEOUS at 07:46

## 2024-05-19 RX ADMIN — LEVOTHYROXINE SODIUM 25 MCG: 25 TABLET ORAL at 05:00

## 2024-05-19 NOTE — PLAN OF CARE
Problem: Potential for Falls  Goal: Patient will remain free of falls  Description: INTERVENTIONS:  - Educate patient/family on patient safety including physical limitations  - Instruct patient to call for assistance with activity   - Consult OT/PT to assist with strengthening/mobility   - Keep Call bell within reach  - Keep bed low and locked with side rails adjusted as appropriate  - Keep care items and personal belongings within reach  - Initiate and maintain comfort rounds  - Make Fall Risk Sign visible to staff  - Offer Toileting every 2 Hours, in advance of need  - Initiate/Maintain bed alarm  - Obtain necessary fall risk management equipment: non slip socks  - Apply yellow socks and bracelet for high fall risk patients  - Consider moving patient to room near nurses station  Outcome: Progressing     Problem: PAIN - ADULT  Goal: Verbalizes/displays adequate comfort level or baseline comfort level  Description: Interventions:  - Encourage patient to monitor pain and request assistance  - Assess pain using appropriate pain scale  - Administer analgesics based on type and severity of pain and evaluate response  - Implement non-pharmacological measures as appropriate and evaluate response  - Consider cultural and social influences on pain and pain management  - Notify physician/advanced practitioner if interventions unsuccessful or patient reports new pain  Outcome: Progressing     Problem: INFECTION - ADULT  Goal: Absence or prevention of progression during hospitalization  Description: INTERVENTIONS:  - Assess and monitor for signs and symptoms of infection  - Monitor lab/diagnostic results  - Monitor all insertion sites, i.e. indwelling lines, tubes, and drains  - Monitor endotracheal if appropriate and nasal secretions for changes in amount and color  - [place] appropriate cooling/warming therapies per order  - Administer medications as ordered  - Instruct and encourage patient and family to use good hand  hygiene technique  - Identify and instruct in appropriate isolation precautions for identified infection/condition  Outcome: Progressing  Goal: Absence of fever/infection during neutropenic period  Description: INTERVENTIONS:  - Monitor WBC    Outcome: Progressing     Problem: SAFETY ADULT  Goal: Patient will remain free of falls  Description: INTERVENTIONS:  - Educate patient/family on patient safety including physical limitations  - Instruct patient to call for assistance with activity   - Consult OT/PT to assist with strengthening/mobility   - Keep Call bell within reach  - Keep bed low and locked with side rails adjusted as appropriate  - Keep care items and personal belongings within reach  - Initiate and maintain comfort rounds  - Make Fall Risk Sign visible to staff  - Offer Toileting every 2 Hours, in advance of need  - Initiate/Maintain bed alarm  - Obtain necessary fall risk management equipment: non slip socks  - Apply yellow socks and bracelet for high fall risk patients  - Consider moving patient to room near nurses station  Outcome: Progressing  Goal: Maintain or return to baseline ADL function  Description: INTERVENTIONS:  -  Assess patient's ability to carry out ADLs; assess patient's baseline for ADL function and identify physical deficits which impact ability to perform ADLs (bathing, care of mouth/teeth, toileting, grooming, dressing, etc.)  - Assess/evaluate cause of self-care deficits   - Assess range of motion  - Assess patient's mobility; develop plan if impaired  - Assess patient's need for assistive devices and provide as appropriate  - Encourage maximum independence but intervene and supervise when necessary  - Involve family in performance of ADLs  - Assess for home care needs following discharge   - Consider OT consult to assist with ADL evaluation and planning for discharge  - Provide patient education as appropriate  Outcome: Progressing  Goal: Maintains/Returns to pre admission  functional level  Description: INTERVENTIONS:  - Perform AM-PAC 6 Click Basic Mobility/ Daily Activity assessment daily.  - Set and communicate daily mobility goal to care team and patient/family/caregiver.   - Collaborate with rehabilitation services on mobility goals if consulted  - Perform Range of Motion 3 times a day.  - Reposition patient every 2 hours.  - Dangle patient 3 times a day  - Stand patient 3 times a day  - Ambulate patient 3 times a day  - Out of bed to chair 3 times a day   - Out of bed for meals 3 times a day  - Out of bed for toileting  - Record patient progress and toleration of activity level   Outcome: Progressing     Problem: CARDIOVASCULAR - ADULT  Goal: Maintains optimal cardiac output and hemodynamic stability  Description: INTERVENTIONS:  - Monitor I/O, vital signs and rhythm  - Monitor for S/S and trends of decreased cardiac output  - Administer and titrate ordered vasoactive medications to optimize hemodynamic stability  - Assess quality of pulses, skin color and temperature  - Assess for signs of decreased coronary artery perfusion  - Instruct patient to report change in severity of symptoms  Outcome: Progressing  Goal: Absence of cardiac dysrhythmias or at baseline rhythm  Description: INTERVENTIONS:  - Continuous cardiac monitoring, vital signs, obtain 12 lead EKG if ordered  - Administer antiarrhythmic and heart rate control medications as ordered  - Monitor electrolytes and administer replacement therapy as ordered  Outcome: Progressing     Problem: MUSCULOSKELETAL - ADULT  Goal: Maintain or return mobility to safest level of function  Description: INTERVENTIONS:  - Assess patient's ability to carry out ADLs; assess patient's baseline for ADL function and identify physical deficits which impact ability to perform ADLs (bathing, care of mouth/teeth, toileting, grooming, dressing, etc.)  - Assess/evaluate cause of self-care deficits   - Assess range of motion  - Assess patient's  mobility  - Assess patient's need for assistive devices and provide as appropriate  - Encourage maximum independence but intervene and supervise when necessary  - Involve family in performance of ADLs  - Assess for home care needs following discharge   - Consider OT consult to assist with ADL evaluation and planning for discharge  - Provide patient education as appropriate  Outcome: Progressing  Goal: Maintain proper alignment of affected body part  Description: INTERVENTIONS:  - Support, maintain and protect limb and body alignment  - Provide patient/ family with appropriate education  Outcome: Progressing     Problem: Nutrition/Hydration-ADULT  Goal: Nutrient/Hydration intake appropriate for improving, restoring or maintaining nutritional needs  Description: Monitor and assess patient's nutrition/hydration status for malnutrition. Collaborate with interdisciplinary team and initiate plan and interventions as ordered.  Monitor patient's weight and dietary intake as ordered or per policy. Utilize nutrition screening tool and intervene as necessary. Determine patient's food preferences and provide high-protein, high-caloric foods as appropriate.     INTERVENTIONS:  - Monitor oral intake, urinary output, labs, and treatment plans  - Assess nutrition and hydration status and recommend course of action  - Evaluate amount of meals eaten  - Assist patient with eating if necessary   - Allow adequate time for meals  - Recommend/ encourage appropriate diets, oral nutritional supplements, and vitamin/mineral supplements  - Order, calculate, and assess calorie counts as needed  - Recommend, monitor, and adjust tube feedings and TPN/PPN based on assessed needs  - Assess need for intravenous fluids  - Provide specific nutrition/hydration education as appropriate  - Include patient/family/caregiver in decisions related to nutrition  Outcome: Progressing

## 2024-05-19 NOTE — UTILIZATION REVIEW
Continued Stay Review  Please see initial review completed by Litzy Castro on 5/18  Date: 5/19                          Current Patient Class: inpatient   Current Level of Care: Med/surg    HPI:89 y.o. female initially admitted on 5/18     Assessment/Plan:   Date: 5/19  Day 3: Has surpassed a 2nd midnight with active treatments and services. INitially admitted under observation, converted to inpatient for syncope, concern for cardiac etiology.  Monitoring tele. ECHO pending.  +orthostasis, given iv albumin, ordered compression socks.         Vital Signs: ital Signs (last 2 days)    Date/Time Temp Pulse Resp BP MAP (mmHg) SpO2 O2 Device Patient Position - Orthostatic VS   05/19/24 14:30:56 97.4 °F (36.3 °C) Abnormal  -- 16 134/60 85 -- -- --   05/19/24 09:59:18 -- 75 -- 98/47 Abnormal  64 Abnormal  98 % -- Standing - Orthostatic VS   05/19/24 09:56:24 -- 75 -- 115/59 78 98 % -- Sitting - Orthostatic VS   05/19/24 09:53:41 -- 63 -- 122/51 75 97 % -- Lying - Orthostatic VS       Pertinent Labs/Diagnostic Results:   Results from last 7 days   Lab Units 05/17/24  2206   SARS-COV-2  Negative     Results from last 7 days   Lab Units 05/18/24  0209 05/17/24 2206   WBC Thousand/uL 7.90 7.82   HEMOGLOBIN g/dL 11.2* 11.8   HEMATOCRIT % 34.2* 35.5   PLATELETS Thousands/uL 204 198   TOTAL NEUT ABS Thousands/µL  --  5.86         Results from last 7 days   Lab Units 05/19/24  0500 05/18/24  0209 05/17/24 2206   SODIUM mmol/L 139 134* 134*   POTASSIUM mmol/L 3.8 3.9 3.7   CHLORIDE mmol/L 108 101 100   CO2 mmol/L 26 26 28   ANION GAP mmol/L 5 7 6   BUN mg/dL 21 29* 26*   CREATININE mg/dL 0.81 0.86 0.88   EGFR ml/min/1.73sq m 64 60 58   CALCIUM mg/dL 8.5 9.5 9.3   MAGNESIUM mg/dL  --  2.0  --    PHOSPHORUS mg/dL  --  3.9  --      Results from last 7 days   Lab Units 05/17/24  2206   AST U/L 22   ALT U/L 15   ALK PHOS U/L 49   TOTAL PROTEIN g/dL 6.6   ALBUMIN g/dL 4.1   TOTAL BILIRUBIN mg/dL 0.55         Results from last 7  days   Lab Units 05/19/24  0500 05/18/24  0209 05/17/24  2206   GLUCOSE RANDOM mg/dL 81 95 84         Results from last 7 days   Lab Units 05/18/24  0033 05/17/24  2206   HS TNI 0HR ng/L  --  6   HS TNI 2HR ng/L 6  --    HSTNI D2 ng/L 0  --          Results from last 7 days   Lab Units 05/17/24  2206   PROTIME seconds 13.9   INR  1.03   PTT seconds 31             Results from last 7 days   Lab Units 05/17/24  2206   INFLUENZA A PCR  Negative   INFLUENZA B PCR  Negative   RSV PCR  Negative       Medications:   Scheduled Medications:  enoxaparin, 30 mg, Subcutaneous, Daily  levothyroxine, 25 mcg, Oral, Early Morning      Continuous IV Infusions:     PRN Meds:  acetaminophen, 650 mg, Oral, Q6H PRN  aluminum-magnesium hydroxide-simethicone, 30 mL, Oral, Q6H PRN  ondansetron, 4 mg, Intravenous, Q6H PRN  senna, 1 tablet, Oral, HS PRN        Discharge Plan: TBD    Network Utilization Review Department  ATTENTION: Please call with any questions or concerns to [phone] and carefully listen to the prompts so that you are directed to the right person. All voicemails are confidential.   For Discharge needs, contact Care Management DC Support Team at [phone] opt. 2  Send all requests for admission clinical reviews, approved or denied determinations and any other requests to dedicated fax number below belonging to the campus where the patient is receiving treatment. List of dedicated fax numbers for the Facilities:  FACILITY NAME UR FAX NUMBER   ADMISSION DENIALS (Administrative/Medical Necessity) [phone]   DISCHARGE SUPPORT TEAM (NETWORK) [phone]   PARENT CHILD HEALTH (Maternity/NICU/Pediatrics) [phone]   [facility] [phone]   [facility] [phone]   [facility] [phone]   [facility] [phone]   [facility] [phone]   [facility]  [phone]   [facility] [phone]   [facility] [phone]   [facility] [phone]   [facility] [phone]   [facility] [phone]   [facility] [phone]

## 2024-05-19 NOTE — PLAN OF CARE
Problem: Potential for Falls  Goal: Patient will remain free of falls  Description: INTERVENTIONS:  - Educate patient/family on patient safety including physical limitations  - Instruct patient to call for assistance with activity   - Consult OT/PT to assist with strengthening/mobility   - Keep Call bell within reach  - Keep bed low and locked with side rails adjusted as appropriate  - Keep care items and personal belongings within reach  - Initiate and maintain comfort rounds  - Make Fall Risk Sign visible to staff  - Offer Toileting every 2 Hours, in advance of need  - Initiate/Maintain bed alarm  - Obtain necessary fall risk management equipment: non slip socks  - Apply yellow socks and bracelet for high fall risk patients  - Consider moving patient to room near nurses station  Outcome: Progressing     Problem: PAIN - ADULT  Goal: Verbalizes/displays adequate comfort level or baseline comfort level  Description: Interventions:  - Encourage patient to monitor pain and request assistance  - Assess pain using appropriate pain scale  - Administer analgesics based on type and severity of pain and evaluate response  - Implement non-pharmacological measures as appropriate and evaluate response  - Consider cultural and social influences on pain and pain management  - Notify physician/advanced practitioner if interventions unsuccessful or patient reports new pain  Outcome: Progressing     Problem: INFECTION - ADULT  Goal: Absence or prevention of progression during hospitalization  Description: INTERVENTIONS:  - Assess and monitor for signs and symptoms of infection  - Monitor lab/diagnostic results  - Monitor all insertion sites, i.e. indwelling lines, tubes, and drains  - Monitor endotracheal if appropriate and nasal secretions for changes in amount and color  - [place] appropriate cooling/warming therapies per order  - Administer medications as ordered  - Instruct and encourage patient and family to use good hand  hygiene technique  - Identify and instruct in appropriate isolation precautions for identified infection/condition  Outcome: Progressing  Goal: Absence of fever/infection during neutropenic period  Description: INTERVENTIONS:  - Monitor WBC    Outcome: Progressing     Problem: SAFETY ADULT  Goal: Patient will remain free of falls  Description: INTERVENTIONS:  - Educate patient/family on patient safety including physical limitations  - Instruct patient to call for assistance with activity   - Consult OT/PT to assist with strengthening/mobility   - Keep Call bell within reach  - Keep bed low and locked with side rails adjusted as appropriate  - Keep care items and personal belongings within reach  - Initiate and maintain comfort rounds  - Make Fall Risk Sign visible to staff  - Offer Toileting every 2 Hours, in advance of need  - Initiate/Maintain bed alarm  - Obtain necessary fall risk management equipment: non slip socks  - Apply yellow socks and bracelet for high fall risk patients  - Consider moving patient to room near nurses station  Outcome: Progressing  Goal: Maintain or return to baseline ADL function  Description: INTERVENTIONS:  -  Assess patient's ability to carry out ADLs; assess patient's baseline for ADL function and identify physical deficits which impact ability to perform ADLs (bathing, care of mouth/teeth, toileting, grooming, dressing, etc.)  - Assess/evaluate cause of self-care deficits   - Assess range of motion  - Assess patient's mobility; develop plan if impaired  - Assess patient's need for assistive devices and provide as appropriate  - Encourage maximum independence but intervene and supervise when necessary  - Involve family in performance of ADLs  - Assess for home care needs following discharge   - Consider OT consult to assist with ADL evaluation and planning for discharge  - Provide patient education as appropriate  Outcome: Progressing  Goal: Maintains/Returns to pre admission  functional level  Description: INTERVENTIONS:  - Perform AM-PAC 6 Click Basic Mobility/ Daily Activity assessment daily.  - Set and communicate daily mobility goal to care team and patient/family/caregiver.   - Collaborate with rehabilitation services on mobility goals if consulted  - Perform Range of Motion 5 times a day.  - Reposition patient every 2 hours.  - Dangle patient 3 times a day  - Stand patient 3 times a day  - Ambulate patient 3 times a day  - Out of bed to chair 3 times a day   - Out of bed for meals 3 times a day  - Out of bed for toileting  - Record patient progress and toleration of activity level   Outcome: Progressing     Problem: DISCHARGE PLANNING  Goal: Discharge to home or other facility with appropriate resources  Description: INTERVENTIONS:  - Identify barriers to discharge w/patient and caregiver  - Arrange for needed discharge resources and transportation as appropriate  - Identify discharge learning needs (meds, wound care, etc.)  - Arrange for interpretive services to assist at discharge as needed  - Refer to Case Management Department for coordinating discharge planning if the patient needs post-hospital services based on physician/advanced practitioner order or complex needs related to functional status, cognitive ability, or social support system  Outcome: Progressing     Problem: Knowledge Deficit  Goal: Patient/family/caregiver demonstrates understanding of disease process, treatment plan, medications, and discharge instructions  Description: Complete learning assessment and assess knowledge base.  Interventions:  - Provide teaching at level of understanding  - Provide teaching via preferred learning methods  Outcome: Progressing     Problem: NEUROSENSORY - ADULT  Goal: Achieves stable or improved neurological status  Description: INTERVENTIONS  - Monitor and report changes in neurological status  - Monitor vital signs such as temperature, blood pressure, glucose, and any other  labs ordered   - Initiate measures to prevent increased intracranial pressure  - Monitor for seizure activity and implement precautions if appropriate      Outcome: Progressing  Goal: Achieves maximal functionality and self care  Description: INTERVENTIONS  - Monitor swallowing and airway patency with patient fatigue and changes in neurological status  - Encourage and assist patient to increase activity and self care.   - Encourage visually impaired, hearing impaired and aphasic patients to use assistive/communication devices  Outcome: Progressing     Problem: CARDIOVASCULAR - ADULT  Goal: Maintains optimal cardiac output and hemodynamic stability  Description: INTERVENTIONS:  - Monitor I/O, vital signs and rhythm  - Monitor for S/S and trends of decreased cardiac output  - Administer and titrate ordered vasoactive medications to optimize hemodynamic stability  - Assess quality of pulses, skin color and temperature  - Assess for signs of decreased coronary artery perfusion  - Instruct patient to report change in severity of symptoms  Outcome: Progressing  Goal: Absence of cardiac dysrhythmias or at baseline rhythm  Description: INTERVENTIONS:  - Continuous cardiac monitoring, vital signs, obtain 12 lead EKG if ordered  - Administer antiarrhythmic and heart rate control medications as ordered  - Monitor electrolytes and administer replacement therapy as ordered  Outcome: Progressing     Problem: METABOLIC, FLUID AND ELECTROLYTES - ADULT  Goal: Electrolytes maintained within normal limits  Description: INTERVENTIONS:  - Monitor labs and assess patient for signs and symptoms of electrolyte imbalances  - Administer electrolyte replacement as ordered  - Monitor response to electrolyte replacements, including repeat lab results as appropriate  - Instruct patient on fluid and nutrition as appropriate  Outcome: Progressing  Goal: Fluid balance maintained  Description: INTERVENTIONS:  - Monitor labs   - Monitor I/O and WT  -  Instruct patient on fluid and nutrition as appropriate  - Assess for signs & symptoms of volume excess or deficit  Outcome: Progressing     Problem: MUSCULOSKELETAL - ADULT  Goal: Maintain or return mobility to safest level of function  Description: INTERVENTIONS:  - Assess patient's ability to carry out ADLs; assess patient's baseline for ADL function and identify physical deficits which impact ability to perform ADLs (bathing, care of mouth/teeth, toileting, grooming, dressing, etc.)  - Assess/evaluate cause of self-care deficits   - Assess range of motion  - Assess patient's mobility  - Assess patient's need for assistive devices and provide as appropriate  - Encourage maximum independence but intervene and supervise when necessary  - Involve family in performance of ADLs  - Assess for home care needs following discharge   - Consider OT consult to assist with ADL evaluation and planning for discharge  - Provide patient education as appropriate  Outcome: Progressing  Goal: Maintain proper alignment of affected body part  Description: INTERVENTIONS:  - Support, maintain and protect limb and body alignment  - Provide patient/ family with appropriate education  Outcome: Progressing     Problem: Nutrition/Hydration-ADULT  Goal: Nutrient/Hydration intake appropriate for improving, restoring or maintaining nutritional needs  Description: Monitor and assess patient's nutrition/hydration status for malnutrition. Collaborate with interdisciplinary team and initiate plan and interventions as ordered.  Monitor patient's weight and dietary intake as ordered or per policy. Utilize nutrition screening tool and intervene as necessary. Determine patient's food preferences and provide high-protein, high-caloric foods as appropriate.     INTERVENTIONS:  - Monitor oral intake, urinary output, labs, and treatment plans  - Assess nutrition and hydration status and recommend course of action  - Evaluate amount of meals eaten  - Assist  patient with eating if necessary   - Allow adequate time for meals  - Recommend/ encourage appropriate diets, oral nutritional supplements, and vitamin/mineral supplements  - Order, calculate, and assess calorie counts as needed  - Recommend, monitor, and adjust tube feedings and TPN/PPN based on assessed needs  - Assess need for intravenous fluids  - Provide specific nutrition/hydration education as appropriate  - Include patient/family/caregiver in decisions related to nutrition  Outcome: Progressing

## 2024-05-19 NOTE — PROGRESS NOTES
[facility]  Progress Note  Name: Aide Yanes I  MRN: [id number]  Unit/Bed#: -01 I Date of Admission: 5/17/2024   Date of Service: 5/19/2024 I Hospital Day: 1    Assessment & Plan   * Syncope  Assessment & Plan  Was sitting eating with a friend when she suddenly experienced blurred vision and then her color changed to gray and she became unresponsive for 45 seconds, no seizure-like activity witnessed  CTh unremarkable, labs unrevealing  Concern for cardiac etiology, monitor on telemetry  Discussed with cardio continue to monitor on tele and obtain echo  Pt was orthostatic will give x1 albumin and order compression socks    CHICAS (dyspnea on exertion)  Assessment & Plan  Reports intermittent dyspnea on exertion with stairs over the last month  Scheduled for outpatient echo next month, will obtain while inpatient  Exam is unremarkable on admit  Echo pending    Other specified hypothyroidism  Assessment & Plan  Continue home Synthroid 25 mcg daily               VTE Pharmacologic Prophylaxis: VTE Score: 3 Moderate Risk (Score 3-4) - Pharmacological DVT Prophylaxis Ordered: enoxaparin (Lovenox).    Mobility:   Basic Mobility Inpatient Raw Score: 22  JH-HLM Goal: 7: Walk 25 feet or more  JH-HLM Achieved: 7: Walk 25 feet or more  JH-HLM Goal achieved. Continue to encourage appropriate mobility.    Patient Centered Rounds: I performed bedside rounds with nursing staff today.   Discussions with Specialists or Other Care Team Provider: cardio, cm    Education and Discussions with Family / Patient: Updated  (daughter) via phone.    Total Time Spent on Date of Encounter in care of patient: 53 mins. This time was spent on one or more of the following: performing physical exam; counseling and coordination of care; obtaining or reviewing history; documenting in the medical record; reviewing/ordering tests, medications or procedures; communicating with other healthcare professionals  and discussing with patient's family/caregivers.    Current Length of Stay: 1 day(s)  Current Patient Status: Inpatient   Certification Statement: The patient will continue to require additional inpatient hospital stay due to syncope  Discharge Plan: Anticipate discharge tomorrow to home.    Code Status: Level 1 - Full Code    Subjective:   Aide was seen and examined at bedside. No acute events overnight. Discussed plan of care. All questions and concerns were answered and addressed. Pt has no acute concerns at this time. Pt had orthostatic hypotension. Will give albumin and compressions socks. Await Echo and further recommendations for Cardio tomorrow.    Objective:     Vitals:   Temp (24hrs), Av °F (36.7 °C), Min:97.8 °F (36.6 °C), Max:98.4 °F (36.9 °C)    Temp:  [97.8 °F (36.6 °C)-98.4 °F (36.9 °C)] 98.4 °F (36.9 °C)  HR:  [59-75] 75  Resp:  [16] 16  BP: ()/(47-71) 98/47  SpO2:  [96 %-100 %] 98 %  Body mass index is 17.89 kg/m².     Input and Output Summary (last 24 hours):     Intake/Output Summary (Last 24 hours) at 2024 1118  Last data filed at 2024 0949  Gross per 24 hour   Intake 1240 ml   Output 300 ml   Net 940 ml       Physical Exam:   Physical Exam  Vitals and nursing note reviewed.   Constitutional:       General: She is not in acute distress.     Appearance: She is not ill-appearing.      Comments: Thin frail   HENT:      Head: Normocephalic and atraumatic.   Cardiovascular:      Rate and Rhythm: Normal rate and regular rhythm.      Pulses: Normal pulses.      Heart sounds: Normal heart sounds.   Pulmonary:      Effort: Pulmonary effort is normal.      Breath sounds: Normal breath sounds.   Abdominal:      General: Abdomen is flat. Bowel sounds are normal.      Palpations: Abdomen is soft.   Musculoskeletal:      Right lower leg: No edema.      Left lower leg: No edema.   Skin:     General: Skin is warm.   Neurological:      General: No focal deficit present.      Mental Status:  She is alert and oriented to person, place, and time.          Additional Data:     Labs:  Results from last 7 days   Lab Units 05/18/24  0209 05/17/24  2206   WBC Thousand/uL 7.90 7.82   HEMOGLOBIN g/dL 11.2* 11.8   HEMATOCRIT % 34.2* 35.5   PLATELETS Thousands/uL 204 198   SEGS PCT %  --  74   LYMPHO PCT %  --  14   MONO PCT %  --  9   EOS PCT %  --  1     Results from last 7 days   Lab Units 05/19/24  0500 05/18/24  0209 05/17/24  2206   SODIUM mmol/L 139   < > 134*   POTASSIUM mmol/L 3.8   < > 3.7   CHLORIDE mmol/L 108   < > 100   CO2 mmol/L 26   < > 28   BUN mg/dL 21   < > 26*   CREATININE mg/dL 0.81   < > 0.88   ANION GAP mmol/L 5   < > 6   CALCIUM mg/dL 8.5   < > 9.3   ALBUMIN g/dL  --   --  4.1   TOTAL BILIRUBIN mg/dL  --   --  0.55   ALK PHOS U/L  --   --  49   ALT U/L  --   --  15   AST U/L  --   --  22   GLUCOSE RANDOM mg/dL 81   < > 84    < > = values in this interval not displayed.     Results from last 7 days   Lab Units 05/17/24  2206   INR  1.03                   Lines/Drains:  Invasive Devices       Peripheral Intravenous Line  Duration             Peripheral IV 05/18/24 Right;Ventral (anterior) Forearm 1 day                      Telemetry:  Telemetry Orders (From admission, onward)               24 Hour Telemetry Monitoring  Continuous x 24 Hours (Telem)        Question:  Reason for 24 Hour Telemetry  Answer:  Syncope suspected to be cardiac in origin                                  Imaging: No pertinent imaging reviewed.    Recent Cultures (last 7 days):         Last 24 Hours Medication List:   Current Facility-Administered Medications   Medication Dose Route Frequency Provider Last Rate    acetaminophen  650 mg Oral Q6H PRN Mena Bojorquez PA-C      Albumin 5%  12.5 g Intravenous Once Neetu Cannon MD      aluminum-magnesium hydroxide-simethicone  30 mL Oral Q6H PRN Mena Bojorquez PA-C      enoxaparin  30 mg Subcutaneous Daily Mena Bojorquez PA-C      levothyroxine  25 mcg Oral  Early Morning Mena Bojorquez PA-C      ondansetron  4 mg Intravenous Q6H PRN Mena Bojorquez PA-C      senna  1 tablet Oral HS PRN Mena Bojorquez PA-C          Today, Patient Was Seen By: Neetu Cannon MD    **Please Note: This note may have been constructed using a voice recognition system.**

## 2024-05-19 NOTE — MALNUTRITION/BMI
This medical record reflects one or more clinical indicators suggestive of malnutrition and/or morbid obesity.                                  BMI Findings:  Adult BMI Classifications: Underweight < 18.5        Body mass index is 17.89 kg/m².

## 2024-05-20 ENCOUNTER — APPOINTMENT (INPATIENT)
Dept: NON INVASIVE DIAGNOSTICS | Facility: HOSPITAL | Age: 89
DRG: 312 | End: 2024-05-20
Payer: COMMERCIAL

## 2024-05-20 VITALS
OXYGEN SATURATION: 99 % | HEIGHT: 64 IN | TEMPERATURE: 97.8 F | WEIGHT: 104 LBS | HEART RATE: 69 BPM | SYSTOLIC BLOOD PRESSURE: 133 MMHG | DIASTOLIC BLOOD PRESSURE: 63 MMHG | BODY MASS INDEX: 17.75 KG/M2 | RESPIRATION RATE: 17 BRPM

## 2024-05-20 LAB
ANION GAP SERPL CALCULATED.3IONS-SCNC: 6 MMOL/L (ref 4–13)
AORTIC ROOT: 3 CM
APICAL FOUR CHAMBER EJECTION FRACTION: 66 %
ASCENDING AORTA: 2.9 CM
BSA FOR ECHO PROCEDURE: 1.48 M2
BUN SERPL-MCNC: 23 MG/DL (ref 5–25)
CALCIUM SERPL-MCNC: 8.8 MG/DL (ref 8.4–10.2)
CHLORIDE SERPL-SCNC: 108 MMOL/L (ref 96–108)
CO2 SERPL-SCNC: 25 MMOL/L (ref 21–32)
CREAT SERPL-MCNC: 0.69 MG/DL (ref 0.6–1.3)
DOP CALC LVOT AREA: 3.14 CM2
DOP CALC LVOT DIAMETER: 2 CM
E WAVE DECELERATION TIME: 145 MS
E/A RATIO: 1.24
ERYTHROCYTE [DISTWIDTH] IN BLOOD BY AUTOMATED COUNT: 12.9 % (ref 11.6–15.1)
FRACTIONAL SHORTENING: 40 (ref 28–44)
GFR SERPL CREATININE-BSD FRML MDRD: 77 ML/MIN/1.73SQ M
GLUCOSE SERPL-MCNC: 85 MG/DL (ref 65–140)
HCT VFR BLD AUTO: 33.7 % (ref 34.8–46.1)
HGB BLD-MCNC: 10.9 G/DL (ref 11.5–15.4)
INTERVENTRICULAR SEPTUM IN DIASTOLE (PARASTERNAL SHORT AXIS VIEW): 0.8 CM
INTERVENTRICULAR SEPTUM: 0.8 CM (ref 0.6–1.1)
LAAS-AP2: 14.8 CM2
LAAS-AP4: 17.7 CM2
LEFT ATRIUM AREA SYSTOLE SINGLE PLANE A4C: 16.8 CM2
LEFT ATRIUM SIZE: 3 CM
LEFT ATRIUM VOLUME (MOD BIPLANE): 44 ML
LEFT ATRIUM VOLUME INDEX (MOD BIPLANE): 29.7 ML/M2
LEFT INTERNAL DIMENSION IN SYSTOLE: 2.4 CM (ref 2.1–4)
LEFT VENTRICULAR INTERNAL DIMENSION IN DIASTOLE: 4 CM (ref 3.5–6)
LEFT VENTRICULAR POSTERIOR WALL IN END DIASTOLE: 0.8 CM
LEFT VENTRICULAR STROKE VOLUME: 51 ML
LVSV (TEICH): 51 ML
MCH RBC QN AUTO: 30.4 PG (ref 26.8–34.3)
MCHC RBC AUTO-ENTMCNC: 32.3 G/DL (ref 31.4–37.4)
MCV RBC AUTO: 94 FL (ref 82–98)
MITRAL REGURGITATION PEAK VELOCITY: 5.37 M/S
MITRAL VALVE MEAN INFLOW VELOCITY: 4.65 M/S
MITRAL VALVE REGURGITANT PEAK GRADIENT: 115 MMHG
MV E'TISSUE VEL-SEP: 9 CM/S
MV PEAK A VEL: 0.59 M/S
MV PEAK E VEL: 73 CM/S
MV STENOSIS PRESSURE HALF TIME: 42 MS
MV VALVE AREA P 1/2 METHOD: 5.24
PLATELET # BLD AUTO: 180 THOUSANDS/UL (ref 149–390)
PMV BLD AUTO: 8.4 FL (ref 8.9–12.7)
POTASSIUM SERPL-SCNC: 3.5 MMOL/L (ref 3.5–5.3)
RA PRESSURE ESTIMATED: 3 MMHG
RBC # BLD AUTO: 3.59 MILLION/UL (ref 3.81–5.12)
RIGHT ATRIUM AREA SYSTOLE A4C: 12.9 CM2
RIGHT VENTRICLE ID DIMENSION: 3.5 CM
RV PSP: 37 MMHG
SL CV DOP CALC MV VTI RETROGRADE: 214.1 CM
SL CV LEFT ATRIUM LENGTH A2C: 5.3 CM
SL CV LV EF: 65
SL CV MV MEAN GRADIENT RETROGRADE: 90 MMHG
SL CV PED ECHO LEFT VENTRICLE DIASTOLIC VOLUME (MOD BIPLANE) 2D: 70 ML
SL CV PED ECHO LEFT VENTRICLE SYSTOLIC VOLUME (MOD BIPLANE) 2D: 20 ML
SL CV TR MAX PG ANTEGRADE: 28 MMHG
SODIUM SERPL-SCNC: 139 MMOL/L (ref 135–147)
TR MAX PG: 34 MMHG
TR PEAK VELOCITY: 2.9 M/S
TRICUSPID ANNULAR PLANE SYSTOLIC EXCURSION: 2.6 CM
TRICUSPID VALVE PEAK REGURGITATION VELOCITY: 2.93 M/S
TV MEAN GRADIENT: 22 MMHG
TV MV D: 2.29 M/S
TV VTI: 83.99 CM
WBC # BLD AUTO: 7.85 THOUSAND/UL (ref 4.31–10.16)

## 2024-05-20 PROCEDURE — 99232 SBSQ HOSP IP/OBS MODERATE 35: CPT | Performed by: INTERNAL MEDICINE

## 2024-05-20 PROCEDURE — 99239 HOSP IP/OBS DSCHRG MGMT >30: CPT | Performed by: HOSPITALIST

## 2024-05-20 PROCEDURE — 80048 BASIC METABOLIC PNL TOTAL CA: CPT | Performed by: STUDENT IN AN ORGANIZED HEALTH CARE EDUCATION/TRAINING PROGRAM

## 2024-05-20 PROCEDURE — 93306 TTE W/DOPPLER COMPLETE: CPT | Performed by: INTERNAL MEDICINE

## 2024-05-20 PROCEDURE — 85027 COMPLETE CBC AUTOMATED: CPT | Performed by: STUDENT IN AN ORGANIZED HEALTH CARE EDUCATION/TRAINING PROGRAM

## 2024-05-20 PROCEDURE — 93306 TTE W/DOPPLER COMPLETE: CPT

## 2024-05-20 RX ADMIN — ENOXAPARIN SODIUM 30 MG: 30 INJECTION SUBCUTANEOUS at 08:12

## 2024-05-20 RX ADMIN — LEVOTHYROXINE SODIUM 25 MCG: 25 TABLET ORAL at 05:30

## 2024-05-20 RX ADMIN — ACETAMINOPHEN 650 MG: 325 TABLET, FILM COATED ORAL at 08:25

## 2024-05-20 NOTE — PLAN OF CARE
Problem: Potential for Falls  Goal: Patient will remain free of falls  Description: INTERVENTIONS:  - Educate patient/family on patient safety including physical limitations  - Instruct patient to call for assistance with activity   - Consult OT/PT to assist with strengthening/mobility   - Keep Call bell within reach  - Keep bed low and locked with side rails adjusted as appropriate  - Keep care items and personal belongings within reach  - Initiate and maintain comfort rounds  - Make Fall Risk Sign visible to staff  - Offer Toileting every 2 Hours, in advance of need  - Initiate/Maintain bed alarm  - Obtain necessary fall risk management equipment: non slip socks  - Apply yellow socks and bracelet for high fall risk patients  - Consider moving patient to room near nurses station  Outcome: Progressing     Problem: INFECTION - ADULT  Goal: Absence or prevention of progression during hospitalization  Description: INTERVENTIONS:  - Assess and monitor for signs and symptoms of infection  - Monitor lab/diagnostic results  - Monitor all insertion sites, i.e. indwelling lines, tubes, and drains  - Monitor endotracheal if appropriate and nasal secretions for changes in amount and color  - [place] appropriate cooling/warming therapies per order  - Administer medications as ordered  - Instruct and encourage patient and family to use good hand hygiene technique  - Identify and instruct in appropriate isolation precautions for identified infection/condition  Outcome: Progressing     Problem: SAFETY ADULT  Goal: Patient will remain free of falls  Description: INTERVENTIONS:  - Educate patient/family on patient safety including physical limitations  - Instruct patient to call for assistance with activity   - Consult OT/PT to assist with strengthening/mobility   - Keep Call bell within reach  - Keep bed low and locked with side rails adjusted as appropriate  - Keep care items and personal belongings within reach  - Initiate  and maintain comfort rounds  - Make Fall Risk Sign visible to staff  - Offer Toileting every 2 Hours, in advance of need  - Initiate/Maintain bed alarm  - Obtain necessary fall risk management equipment: non slip socks  - Apply yellow socks and bracelet for high fall risk patients  - Consider moving patient to room near nurses station  Outcome: Progressing

## 2024-05-20 NOTE — UTILIZATION REVIEW
NOTIFICATION OF INPATIENT ADMISSION   AUTHORIZATION REQUEST   SERVICING FACILITY:   [place] [address]  Tax ID: [id number]  NPI: [id number] ATTENDING PROVIDER:  Attending Name and NPI#: Eliza Bartlett Md [[id number]]  Address: [address] [place] [address]  Phone: [phone]   ADMISSION INFORMATION:  Place of Service: Inpatient [facility]  Place of Service Code: 21  Inpatient Admission Date/Time: 5/18/24  5:26 PM  Discharge Date/Time: No discharge date for patient encounter.  Admitting Diagnosis Code/Description:  Dizziness [R42]  Unresponsive episode [R40.4]  Blurry vision, bilateral [H53.8]  Headache [R51.9]     UTILIZATION REVIEW CONTACT:  Jacki Croonado, Utilization   Network Utilization Review Department  Phone: [phone]  Fax: [phone]  Email: [email or web address]  Contact for approvals/pending authorizations, clinical reviews, and discharge.     PHYSICIAN ADVISORY SERVICES:  Medical Necessity Denial & Sqjz-ol-Jnex Review  Phone: [phone]  Fax: [phone]  Email: [email or web address]     DISCHARGE SUPPORT TEAM:  For Patients Discharge Needs & Updates  Phone: [phone] opt. 2 Fax: [phone]  Email: [email or web address]

## 2024-05-20 NOTE — ASSESSMENT & PLAN NOTE
Reports intermittent dyspnea on exertion with stairs over the last month  Scheduled for outpatient echo next month, will obtain while inpatient  Exam is unremarkable on admit  Echo as above. No current sx

## 2024-05-20 NOTE — DISCHARGE SUMMARY
Pending sale to [facility]  Discharge- Aide Yanes 11/5/1934, 89 y.o. female MRN: [id number]  Unit/Bed#: -01 Encounter: [id number]  Primary Care Provider: MAXINE Hall   Date and time admitted to hospital: 5/17/2024  8:49 PM    CHICAS (dyspnea on exertion)  Assessment & Plan  Reports intermittent dyspnea on exertion with stairs over the last month  Scheduled for outpatient echo next month, will obtain while inpatient  Exam is unremarkable on admit  Echo as above. No current sx    Other specified hypothyroidism  Assessment & Plan  Continue home Synthroid 25 mcg daily    * Syncope  Assessment & Plan  Was sitting eating with a friend when she suddenly experienced blurred vision and then her color changed to gray and she became unresponsive for 45 seconds, no seizure-like activity witnessed  CTh unremarkable, labs unrevealing  No events on tele  Cards will arrange for 2 week rhythm montitor  Suspect orthostasis  as was orthostastic pos - improved w IV albumin  Echo ef ok       Hospital Course:     Aide Yanes is a 89 y.o. female patient who originally presented to the hospital on   Admission Orders (From admission, onward)       Ordered        05/18/24 1726  INPATIENT ADMISSION  Once            05/18/24 0031  Place in Observation  Once                         due to   Syncope in the setting of orthostasis.  Patient is encouraged to eat and drink more.  Patient's orthostatics improved after IV albumin.  Patient had an echo performed that did not demonstrate significant valvular abnormalities.  Patient will follow-up with cardiology for for longer-term ambulatory rhythm monitoring.      Please see above list of diagnoses and related plan for additional information.     Physical Exam:    GEN: No acute distress, comfortable  HEEENT: No JVD, PERRLA, no scleral icterus  RESP: Lungs clear to auscultation bilaterally  CV: RRR, +s1/s2   ABD: SOFT NON TENDER, POSITIVE BOWEL SOUNDS, NO  DISTENTION  PSYCH: CALM  NEURO: Mentation baseline, NO FOCAL DEFICITS  SKIN: NO RASH  EXTREM: NO EDEMA    CONSULTING PROVIDERS   None    PROCEDURES PERFORMED  * No surgery found *    RADIOLOGY RESULTS  Echo complete w/ contrast if indicated    Result Date: 5/20/2024  Narrative:   Left Ventricle: Left ventricular cavity size is normal. Wall thickness is normal. The left ventricular ejection fraction is 65%. Systolic function is normal. Wall motion is normal. Diastolic function is normal for age.   Right Ventricle: Right ventricular cavity size is normal. Systolic function is normal.   Left Atrium: The atrium is mildly dilated.   Right Atrium: The atrium is normal in size.   Aortic Valve: There is mild regurgitation.   Mitral Valve: There is mild regurgitation.   Tricuspid Valve: There is mild regurgitation.     XR chest 2 views    Result Date: 5/18/2024  Narrative: XR CHEST PA & LATERAL DUAL ENERGY SUBTRACTION. INDICATION:  syncope. COMPARISON: CXR 4/25/2024 and 9/20/2022. FINDINGS: Clear lungs. No pneumothorax or pleural effusion. Calcified breast implants. Unremarkable cardiomediastinal silhouette. Bones are unremarkable for age. Reverse right shoulder arthroplasty. Unremarkable upper abdomen.     Impression: No acute cardiopulmonary disease. Workstation performed: [id number]     CT head without contrast    Result Date: 5/18/2024  Narrative: CT BRAIN - WITHOUT CONTRAST INDICATION:   unresponsive episode. COMPARISON: 4/20/2021. TECHNIQUE:  CT examination of the brain was performed.  Multiplanar 2D reformatted images were created from the source data. Radiation dose length product (DLP) for this visit:  806.73 mGy-cm .  This examination, like all CT scans performed in the [facility], was performed utilizing techniques to minimize radiation dose exposure, including the use of iterative  reconstruction and automated exposure control. IMAGE QUALITY:  Diagnostic. FINDINGS: PARENCHYMA: Decreased  attenuation is noted in periventricular and subcortical white matter demonstrating an appearance that is statistically most likely to represent mild microangiopathic change; this appearance is similar when compared to most recent prior examination. No CT signs of acute infarction.  No intracranial mass, mass effect or midline shift.  No acute parenchymal hemorrhage. VENTRICLES AND EXTRA-AXIAL SPACES: Mild diffuse central and bifrontal predominant cortical volume loss/atrophy is again seen. The cerebral sulci and cisterns are otherwise normal in size and configuration for chronological age.. VISUALIZED ORBITS: Normal visualized orbits. PARANASAL SINUSES: Normal visualized paranasal sinuses. CALVARIUM AND EXTRACRANIAL SOFT TISSUES: Unremarkable.     Impression: Stable mild senescent changes without acute intracranial abnormalities. Workstation performed: [id number]     FL spine and pain procedure    Result Date: 4/29/2024  Narrative: Procedure: Diagnostic Cervical Medial Branch Block of right C4, C5, and C6. Medical Necessity: Intractable, refractory, neck pain. Diagnosis: Cervical spondylosis and neck pain Indication for Fluoroscopy: This procedure requires accurate needle placement that can not be performed percutaneously without fluoroscopic guidance. Procedure Note: After fully informed written consent was obtained and procedure risks reviewed with the patient, the patient was brought into the fluroscopy suit Room and placed prone on the fluoroscopy table. A Chloraprep was performed and the area surrounded by sterile drapes. Fluoroscopy unit was positioned over the patient and images of the spine (AP views) obtained. The entry sites for the above noted levels median branch were determined.. At each level a 25 gauge 3.5 inch spinal needle was placed at the level of the median branch to the facet under fluoroscopic guidance. This was performed by determining needle position based on aligning the needle point with the  waist of each cervical vertebrae under tunnel vision. A dose of 2% lidocaine was administered at each level. The needles at each level were withdrawn following administration of the medication. There was no significant bleeding at the site. The needle was then withdrawn, the neck cleansed, and a band-aid placed. Complications: None. Disposition: Vital signs stable. Motor function was intact. The discharge instruction sheet was given to the patient. The patient was discharged home with a  and with instructions to call immediately if there are any complications. Patient was given a pain diary to determine if pain is facet in origin. Once the diary is returned we will consider next appropriate course of treatment. Estimated blood loss: Minimal No specimens were taken     XR chest 1 view portable    Result Date: 4/25/2024  Narrative: XR CHEST PORTABLE INDICATION: exertional dyspnea. COMPARISON: Chest radiograph dated 9/20/2022. FINDINGS: No focal airspace consolidation, pleural effusion, signs of congestion, or pneumothorax. Cardiomediastinal silhouette is unremarkable. Left shoulder prosthesis again noted.     Impression: No acute cardiopulmonary disease. Workstation performed: [id number]       LABS  Results from last 7 days   Lab Units 05/20/24  0433 05/18/24  0209 05/17/24  2206   WBC Thousand/uL 7.85 7.90 7.82   HEMOGLOBIN g/dL 10.9* 11.2* 11.8   HEMATOCRIT % 33.7* 34.2* 35.5   MCV fL 94 93 92   PLATELETS Thousands/uL 180 204 198   INR   --   --  1.03     Results from last 7 days   Lab Units 05/20/24  0433 05/19/24  0500 05/18/24  0209 05/17/24  2206   SODIUM mmol/L 139 139 134* 134*   POTASSIUM mmol/L 3.5 3.8 3.9 3.7   CHLORIDE mmol/L 108 108 101 100   CO2 mmol/L 25 26 26 28   BUN mg/dL 23 21 29* 26*   CREATININE mg/dL 0.69 0.81 0.86 0.88   CALCIUM mg/dL 8.8 8.5 9.5 9.3   ALBUMIN g/dL  --   --   --  4.1   TOTAL BILIRUBIN mg/dL  --   --   --  0.55   ALK PHOS U/L  --   --   --  49   ALT U/L  --   --   --  15  "  AST U/L  --   --   --  22   EGFR ml/min/1.73sq m 77 64 60 58   GLUCOSE RANDOM mg/dL 85 81 95 84     Results from last 7 days   Lab Units 05/18/24  0033 05/17/24 2206   HS TNI 0HR ng/L  --  6   HS TNI 2HR ng/L 6  --                                     Cultures:         Invalid input(s): \"URIBILINOGEN\"        Results from last 7 days   Lab Units 05/17/24 2206   INFLUENZA A PCR  Negative         Condition at Discharge:  good      Discharge instructions/Information to patient and family:   See after visit summary for information provided to patient and family.      Provisions for Follow-Up Care:  See after visit summary for information related to follow-up care and any pertinent home health orders.      Disposition:     Home       Discharge Statement:  I spent 37 minutes discharging the patient. This time was spent on the day of discharge. I had direct contact with the patient on the day of discharge. Greater than 50% of the total time was spent examining patient, answering all patient questions, arranging and discussing plan of care with patient as well as directly providing post-discharge instructions.  Additional time then spent on discharge activities.    Pt dtr updated    Discharge Medications:  See after visit summary for reconciled discharge medications provided to patient and family.      ** Please Note: This note has been constructed using a voice recognition system **    "

## 2024-05-20 NOTE — PROGRESS NOTES
Progress Note - Cardiology   Aide Yanes 89 y.o. female MRN: [id number]  Unit/Bed#: -01 Encounter: [id number]        Problem List:  Principal Problem:    Syncope  Active Problems:    Other specified hypothyroidism    CHICAS (dyspnea on exertion)      Assessment:  Syncope  Orthostatic hypotension  Conduction system disease  First-degree AV block with right bundle branch block  Hypothyroidism    Plan/ Discussion:  Check echocardiogram  Encourage adequate hydration to prevent orthostasis  Check 2-week outpatient monitor (per patient preference we will order this at her follow-up visit so that it can be applied in the office rather than mail to her home)  Assuming no significant abnormalities on her echocardiogram she could then likely be discharged from our standpoint and follow-up as an outpatient. Message sent to the cardiology office for follow-up.    Subjective:  Feeling fine today  Hopeful to go home later today    Vitals:  Vitals:    05/17/24 2052 05/18/24 0100   Weight: 45.4 kg (100 lb) 47.3 kg (104 lb 3.2 oz)   ,  Vitals:    05/19/24 2020 05/19/24 2022 05/19/24 2300 05/20/24 0827   BP: 155/78 139/69  152/71   BP Location: Left arm Left arm  Left arm   Pulse: 71 77  72   Resp:   16 18   Temp:    98.1 °F (36.7 °C)   TempSrc:   Oral Oral   SpO2: 97% 97%  100%   Weight:       Height:           Exam:  General: Alert awake and oriented, no acute distress  Heart:  Regular rate and rhythm, no murmurs, Normal S1, no edema    Respiratory effort/ Lungs:  Breathing comfortably on room air, clear bilaterally without wheezing, rales, crackles   Abdominal: Non-tender to palpation, + bowel sounds, soft, no masses or distension  Lower Limbs:  No edema            Telemetry:       No longer on telemetry    Medications:    Current Facility-Administered Medications:     acetaminophen (TYLENOL) tablet 650 mg, 650 mg, Oral, Q6H PRN, Mena Bojorquez PA-C, 650 mg at 05/20/24 0825    aluminum-magnesium hydroxide-simethicone  (MAALOX) oral suspension 30 mL, 30 mL, Oral, Q6H PRN, Mena Bojorquez PA-C    enoxaparin (LOVENOX) subcutaneous injection 30 mg, 30 mg, Subcutaneous, Daily, Mena Bojorquez PA-C, 30 mg at 05/20/24 0812    levothyroxine tablet 25 mcg, 25 mcg, Oral, Early Morning, Mena Bojorquez PA-C, 25 mcg at 05/20/24 0530    ondansetron (ZOFRAN) injection 4 mg, 4 mg, Intravenous, Q6H PRN, Mena Bojorquez PA-C    senna (SENOKOT) tablet 8.6 mg, 1 tablet, Oral, HS PRN, Mena Bojorquez PA-C      Labs/Data:        Results from last 7 days   Lab Units 05/20/24  0433 05/18/24  0209 05/17/24  2206   WBC Thousand/uL 7.85 7.90 7.82   HEMOGLOBIN g/dL 10.9* 11.2* 11.8   HEMATOCRIT % 33.7* 34.2* 35.5   PLATELETS Thousands/uL 180 204 198     Results from last 7 days   Lab Units 05/20/24  0433 05/19/24  0500 05/18/24  0209   POTASSIUM mmol/L 3.5 3.8 3.9   CHLORIDE mmol/L 108 108 101   CO2 mmol/L 25 26 26   BUN mg/dL 23 21 29*   CREATININE mg/dL 0.69 0.81 0.86

## 2024-05-20 NOTE — ASSESSMENT & PLAN NOTE
Was sitting eating with a friend when she suddenly experienced blurred vision and then her color changed to gray and she became unresponsive for 45 seconds, no seizure-like activity witnessed  CTh unremarkable, labs unrevealing  No events on tele  Cards will arrange for 2 week rhythm montitor  Suspect orthostasis  as was orthostastic pos - improved w IV albumin  Echo ef ok

## 2024-05-20 NOTE — PLAN OF CARE
Problem: Potential for Falls  Goal: Patient will remain free of falls  Description: INTERVENTIONS:  - Educate patient/family on patient safety including physical limitations  - Instruct patient to call for assistance with activity   - Consult OT/PT to assist with strengthening/mobility   - Keep Call bell within reach  - Keep bed low and locked with side rails adjusted as appropriate  - Keep care items and personal belongings within reach  - Initiate and maintain comfort rounds  - Make Fall Risk Sign visible to staff  - Offer Toileting every 2 Hours, in advance of need  - Initiate/Maintain bed alarm  - Obtain necessary fall risk management equipment: non slip socks  - Apply yellow socks and bracelet for high fall risk patients  - Consider moving patient to room near nurses station  Outcome: Progressing     Problem: PAIN - ADULT  Goal: Verbalizes/displays adequate comfort level or baseline comfort level  Description: Interventions:  - Encourage patient to monitor pain and request assistance  - Assess pain using appropriate pain scale  - Administer analgesics based on type and severity of pain and evaluate response  - Implement non-pharmacological measures as appropriate and evaluate response  - Consider cultural and social influences on pain and pain management  - Notify physician/advanced practitioner if interventions unsuccessful or patient reports new pain  Outcome: Progressing     Problem: INFECTION - ADULT  Goal: Absence or prevention of progression during hospitalization  Description: INTERVENTIONS:  - Assess and monitor for signs and symptoms of infection  - Monitor lab/diagnostic results  - Monitor all insertion sites, i.e. indwelling lines, tubes, and drains  - Monitor endotracheal if appropriate and nasal secretions for changes in amount and color  - [place] appropriate cooling/warming therapies per order  - Administer medications as ordered  - Instruct and encourage patient and family to use good hand  hygiene technique  - Identify and instruct in appropriate isolation precautions for identified infection/condition  Outcome: Progressing  Goal: Absence of fever/infection during neutropenic period  Description: INTERVENTIONS:  - Monitor WBC    Outcome: Progressing     Problem: SAFETY ADULT  Goal: Patient will remain free of falls  Description: INTERVENTIONS:  - Educate patient/family on patient safety including physical limitations  - Instruct patient to call for assistance with activity   - Consult OT/PT to assist with strengthening/mobility   - Keep Call bell within reach  - Keep bed low and locked with side rails adjusted as appropriate  - Keep care items and personal belongings within reach  - Initiate and maintain comfort rounds  - Make Fall Risk Sign visible to staff  - Offer Toileting every 2 Hours, in advance of need  - Initiate/Maintain bed alarm  - Obtain necessary fall risk management equipment: non slip socks  - Apply yellow socks and bracelet for high fall risk patients  - Consider moving patient to room near nurses station  Outcome: Progressing  Goal: Maintain or return to baseline ADL function  Description: INTERVENTIONS:  -  Assess patient's ability to carry out ADLs; assess patient's baseline for ADL function and identify physical deficits which impact ability to perform ADLs (bathing, care of mouth/teeth, toileting, grooming, dressing, etc.)  - Assess/evaluate cause of self-care deficits   - Assess range of motion  - Assess patient's mobility; develop plan if impaired  - Assess patient's need for assistive devices and provide as appropriate  - Encourage maximum independence but intervene and supervise when necessary  - Involve family in performance of ADLs  - Assess for home care needs following discharge   - Consider OT consult to assist with ADL evaluation and planning for discharge  - Provide patient education as appropriate  Outcome: Progressing  Goal: Maintains/Returns to pre admission  functional level  Description: INTERVENTIONS:  - Perform AM-PAC 6 Click Basic Mobility/ Daily Activity assessment daily.  - Set and communicate daily mobility goal to care team and patient/family/caregiver.   - Collaborate with rehabilitation services on mobility goals if consulted  - Perform Range of Motion 3 times a day.  - Reposition patient every 2 hours.  - Dangle patient 3 times a day  - Stand patient 3 times a day  - Ambulate patient 3 times a day  - Out of bed to chair 3 times a day   - Out of bed for meals 3 times a day  - Out of bed for toileting  - Record patient progress and toleration of activity level   Outcome: Progressing     Problem: DISCHARGE PLANNING  Goal: Discharge to home or other facility with appropriate resources  Description: INTERVENTIONS:  - Identify barriers to discharge w/patient and caregiver  - Arrange for needed discharge resources and transportation as appropriate  - Identify discharge learning needs (meds, wound care, etc.)  - Arrange for interpretive services to assist at discharge as needed  - Refer to Case Management Department for coordinating discharge planning if the patient needs post-hospital services based on physician/advanced practitioner order or complex needs related to functional status, cognitive ability, or social support system  Outcome: Progressing     Problem: Knowledge Deficit  Goal: Patient/family/caregiver demonstrates understanding of disease process, treatment plan, medications, and discharge instructions  Description: Complete learning assessment and assess knowledge base.  Interventions:  - Provide teaching at level of understanding  - Provide teaching via preferred learning methods  Outcome: Progressing     Problem: NEUROSENSORY - ADULT  Goal: Achieves stable or improved neurological status  Description: INTERVENTIONS  - Monitor and report changes in neurological status  - Monitor vital signs such as temperature, blood pressure, glucose, and any other  labs ordered   - Initiate measures to prevent increased intracranial pressure  - Monitor for seizure activity and implement precautions if appropriate      Outcome: Progressing  Goal: Achieves maximal functionality and self care  Description: INTERVENTIONS  - Monitor swallowing and airway patency with patient fatigue and changes in neurological status  - Encourage and assist patient to increase activity and self care.   - Encourage visually impaired, hearing impaired and aphasic patients to use assistive/communication devices  Outcome: Progressing     Problem: CARDIOVASCULAR - ADULT  Goal: Maintains optimal cardiac output and hemodynamic stability  Description: INTERVENTIONS:  - Monitor I/O, vital signs and rhythm  - Monitor for S/S and trends of decreased cardiac output  - Administer and titrate ordered vasoactive medications to optimize hemodynamic stability  - Assess quality of pulses, skin color and temperature  - Assess for signs of decreased coronary artery perfusion  - Instruct patient to report change in severity of symptoms  Outcome: Progressing  Goal: Absence of cardiac dysrhythmias or at baseline rhythm  Description: INTERVENTIONS:  - Continuous cardiac monitoring, vital signs, obtain 12 lead EKG if ordered  - Administer antiarrhythmic and heart rate control medications as ordered  - Monitor electrolytes and administer replacement therapy as ordered  Outcome: Progressing     Problem: METABOLIC, FLUID AND ELECTROLYTES - ADULT  Goal: Electrolytes maintained within normal limits  Description: INTERVENTIONS:  - Monitor labs and assess patient for signs and symptoms of electrolyte imbalances  - Administer electrolyte replacement as ordered  - Monitor response to electrolyte replacements, including repeat lab results as appropriate  - Instruct patient on fluid and nutrition as appropriate  Outcome: Progressing  Goal: Fluid balance maintained  Description: INTERVENTIONS:  - Monitor labs   - Monitor I/O and WT  -  Instruct patient on fluid and nutrition as appropriate  - Assess for signs & symptoms of volume excess or deficit  Outcome: Progressing     Problem: MUSCULOSKELETAL - ADULT  Goal: Maintain or return mobility to safest level of function  Description: INTERVENTIONS:  - Assess patient's ability to carry out ADLs; assess patient's baseline for ADL function and identify physical deficits which impact ability to perform ADLs (bathing, care of mouth/teeth, toileting, grooming, dressing, etc.)  - Assess/evaluate cause of self-care deficits   - Assess range of motion  - Assess patient's mobility  - Assess patient's need for assistive devices and provide as appropriate  - Encourage maximum independence but intervene and supervise when necessary  - Involve family in performance of ADLs  - Assess for home care needs following discharge   - Consider OT consult to assist with ADL evaluation and planning for discharge  - Provide patient education as appropriate  Outcome: Progressing  Goal: Maintain proper alignment of affected body part  Description: INTERVENTIONS:  - Support, maintain and protect limb and body alignment  - Provide patient/ family with appropriate education  Outcome: Progressing     Problem: Nutrition/Hydration-ADULT  Goal: Nutrient/Hydration intake appropriate for improving, restoring or maintaining nutritional needs  Description: Monitor and assess patient's nutrition/hydration status for malnutrition. Collaborate with interdisciplinary team and initiate plan and interventions as ordered.  Monitor patient's weight and dietary intake as ordered or per policy. Utilize nutrition screening tool and intervene as necessary. Determine patient's food preferences and provide high-protein, high-caloric foods as appropriate.     INTERVENTIONS:  - Monitor oral intake, urinary output, labs, and treatment plans  - Assess nutrition and hydration status and recommend course of action  - Evaluate amount of meals eaten  - Assist  patient with eating if necessary   - Allow adequate time for meals  - Recommend/ encourage appropriate diets, oral nutritional supplements, and vitamin/mineral supplements  - Order, calculate, and assess calorie counts as needed  - Recommend, monitor, and adjust tube feedings and TPN/PPN based on assessed needs  - Assess need for intravenous fluids  - Provide specific nutrition/hydration education as appropriate  - Include patient/family/caregiver in decisions related to nutrition  Outcome: Progressing

## 2024-05-21 NOTE — UTILIZATION REVIEW
NOTIFICATION OF ADMISSION DISCHARGE   This is a Notification of Discharge from [facility]. Please be advised that this patient has been discharge from our facility. Below you will find the admission and discharge date and time including the patient’s disposition.   UTILIZATION REVIEW CONTACT:  Alanis Garza  Utilization   Network Utilization Review Department  Phone: [phone] x carefully listen to the prompts. All voicemails are confidential.  Email: [email or web address]     ADMISSION INFORMATION  PRESENTATION DATE: 5/17/2024  8:49 PM  OBERVATION ADMISSION DATE:   INPATIENT ADMISSION DATE: 5/18/24  5:26 PM   DISCHARGE DATE: 5/20/2024  4:59 PM   DISPOSITION:Home/Self Care    Network Utilization Review Department  ATTENTION: Please call with any questions or concerns to [phone] and carefully listen to the prompts so that you are directed to the right person. All voicemails are confidential.   For Discharge needs, contact Care Management DC Support Team at [phone] opt. 2  Send all requests for admission clinical reviews, approved or denied determinations and any other requests to dedicated fax number below belonging to the campus where the patient is receiving treatment. List of dedicated fax numbers for the Facilities:  FACILITY NAME UR FAX NUMBER   ADMISSION DENIALS (Administrative/Medical Necessity) [phone]   DISCHARGE SUPPORT TEAM ([facility]) [phone]   PARENT CHILD HEALTH (Maternity/NICU/Pediatrics) [phone]   [facility] [phone]   [facility] [phone]   [facility] [phone]   [facility] [phone]   [facility] [phone]   General acute hospital [phone]   [facility] [phone]   [facility] [phone]   [facility]  [facility] [phone]   [facility] [phone]   General acute hospital [phone]   [facility] [phone]

## 2024-05-23 NOTE — UTILIZATION REVIEW
NOTIFICATION OF ADMISSION DISCHARGE   This is a Notification of Discharge from [facility]. Please be advised that this patient has been discharge from our facility. Below you will find the admission and discharge date and time including the patient’s disposition.   UTILIZATION REVIEW CONTACT:  Jacki Coronado  Utilization   Network Utilization Review Department  Phone: [phone] x carefully listen to the prompts. All voicemails are confidential.  Email: [email or web address]     ADMISSION INFORMATION  PRESENTATION DATE: 5/17/2024  8:49 PM  OBERVATION ADMISSION DATE:   INPATIENT ADMISSION DATE: 5/18/24  5:26 PM   DISCHARGE DATE: 5/20/2024  4:59 PM   DISPOSITION:Home/Self Care    Network Utilization Review Department  ATTENTION: Please call with any questions or concerns to [phone] and carefully listen to the prompts so that you are directed to the right person. All voicemails are confidential.   For Discharge needs, contact Care Management DC Support Team at [phone] opt. 2  Send all requests for admission clinical reviews, approved or denied determinations and any other requests to dedicated fax number below belonging to the campus where the patient is receiving treatment. List of dedicated fax numbers for the Facilities:  FACILITY NAME UR FAX NUMBER   ADMISSION DENIALS (Administrative/Medical Necessity) [phone]   DISCHARGE SUPPORT TEAM ([facility]) [phone]   PARENT CHILD HEALTH (Maternity/NICU/Pediatrics) [phone]   [facility] [phone]   [facility] [phone]   [facility] [phone]   General acute hospital [phone]   [facility] [phone]   [facility] [phone]   [facility] [phone]   [facility] [phone]    [facility] [phone]   [facility] [phone]   [facility] [phone]   [facility] [phone]      Critical access hospital  Discharge- Aide Yanes 11/5/1934, 89 y.o. female MRN: [id number]  Unit/Bed#: -01 Encounter: [id number]  Primary Care Provider: MAXINE Hall   Date and time admitted to hospital: 5/17/2024  8:49 PM     CHICAS (dyspnea on exertion)  Assessment & Plan  Reports intermittent dyspnea on exertion with stairs over the last month  Scheduled for outpatient echo next month, will obtain while inpatient  Exam is unremarkable on admit  Echo as above. No current sx     Other specified hypothyroidism  Assessment & Plan  Continue home Synthroid 25 mcg daily     * Syncope  Assessment & Plan  Was sitting eating with a friend when she suddenly experienced blurred vision and then her color changed to gray and she became unresponsive for 45 seconds, no seizure-like activity witnessed  CTh unremarkable, labs unrevealing  No events on tele  Cards will arrange for 2 week rhythm montitor  Suspect orthostasis  as was orthostastic pos - improved w IV albumin  Echo ef ok         Hospital Course:      Aide Yanes is a 89 y.o. female patient who originally presented to the hospital on   Admission Orders (From admission, onward)          Ordered         05/18/24 1726   INPATIENT ADMISSION  Once             05/18/24 0031   Place in Observation  Once                             due to   Syncope in the setting of orthostasis.  Patient is encouraged to eat and drink more.  Patient's orthostatics improved after IV albumin.  Patient had an echo performed that did not demonstrate significant valvular abnormalities.  Patient will follow-up with cardiology for for longer-term ambulatory rhythm monitoring.        Please see above list of diagnoses and related plan  for additional information.      Physical Exam:     GEN: No acute distress, comfortable  HEEENT: No JVD, PERRLA, no scleral icterus  RESP: Lungs clear to auscultation bilaterally  CV: RRR, +s1/s2   ABD: SOFT NON TENDER, POSITIVE BOWEL SOUNDS, NO DISTENTION  PSYCH: CALM  NEURO: [place] baseline, NO FOCAL DEFICITS  SKIN: NO RASH  EXTREM: NO EDEMA     CONSULTING PROVIDERS   None     PROCEDURES PERFORMED  * No surgery found *     RADIOLOGY RESULTS  Echo complete w/ contrast if indicated     Result Date: 5/20/2024  Narrative:   Left Ventricle: Left ventricular cavity size is normal. Wall thickness is normal. The left ventricular ejection fraction is 65%. Systolic function is normal. Wall motion is normal. Diastolic function is normal for age.   Right Ventricle: Right ventricular cavity size is normal. Systolic function is normal.   Left Atrium: The atrium is mildly dilated.   Right Atrium: The atrium is normal in size.   Aortic Valve: There is mild regurgitation.   Mitral Valve: There is mild regurgitation.   Tricuspid Valve: There is mild regurgitation.      XR chest 2 views     Result Date: 5/18/2024  Narrative: XR CHEST PA & LATERAL DUAL ENERGY SUBTRACTION. INDICATION:  syncope. COMPARISON: CXR 4/25/2024 and 9/20/2022. FINDINGS: Clear lungs. No pneumothorax or pleural effusion. Calcified breast implants. Unremarkable cardiomediastinal silhouette. Bones are unremarkable for age. Reverse right shoulder arthroplasty. Unremarkable upper abdomen.      Impression: No acute cardiopulmonary disease. Workstation performed: [id number]      CT head without contrast     Result Date: 5/18/2024  Narrative: CT BRAIN - WITHOUT CONTRAST INDICATION:   unresponsive episode. COMPARISON: 4/20/2021. TECHNIQUE:  CT examination of the brain was performed.  Multiplanar 2D reformatted images were created from the source data. Radiation dose length product (DLP) for this visit:  806.73 mGy-cm .  This examination, like all CT scans performed  in the [facility], was performed utilizing techniques to minimize radiation dose exposure, including the use of iterative  reconstruction and automated exposure control. IMAGE QUALITY:  Diagnostic. FINDINGS: PARENCHYMA: Decreased attenuation is noted in periventricular and subcortical white matter demonstrating an appearance that is statistically most likely to represent mild microangiopathic change; this appearance is similar when compared to most recent prior examination. No CT signs of acute infarction.  No intracranial mass, mass effect or midline shift.  No acute parenchymal hemorrhage. VENTRICLES AND EXTRA-AXIAL SPACES: Mild diffuse central and bifrontal predominant cortical volume loss/atrophy is again seen. The cerebral sulci and cisterns are otherwise normal in size and configuration for chronological age.. VISUALIZED ORBITS: Normal visualized orbits. PARANASAL SINUSES: Normal visualized paranasal sinuses. CALVARIUM AND EXTRACRANIAL SOFT TISSUES: Unremarkable.      Impression: Stable mild senescent changes without acute intracranial abnormalities. Workstation performed: [id number]      FL spine and pain procedure     Result Date: 4/29/2024  Narrative: Procedure: Diagnostic Cervical Medial Branch Block of right C4, C5, and C6. Medical Necessity: Intractable, refractory, neck pain. Diagnosis: Cervical spondylosis and neck pain Indication for Fluoroscopy: This procedure requires accurate needle placement that can not be performed percutaneously without fluoroscopic guidance. Procedure Note: After fully informed written consent was obtained and procedure risks reviewed with the patient, the patient was brought into the fluroscopy suit Room and placed prone on the fluoroscopy table. A Chloraprep was performed and the area surrounded by sterile drapes. Fluoroscopy unit was positioned over the patient and images of the spine (AP views) obtained. The entry sites for the above noted levels median  branch were determined.. At each level a 25 gauge 3.5 inch spinal needle was placed at the level of the median branch to the facet under fluoroscopic guidance. This was performed by determining needle position based on aligning the needle point with the waist of each cervical vertebrae under tunnel vision. A dose of 2% lidocaine was administered at each level. The needles at each level were withdrawn following administration of the medication. There was no significant bleeding at the site. The needle was then withdrawn, the neck cleansed, and a band-aid placed. Complications: None. Disposition: Vital signs stable. Motor function was intact. The discharge instruction sheet was given to the patient. The patient was discharged home with a  and with instructions to call immediately if there are any complications. Patient was given a pain diary to determine if pain is facet in origin. Once the diary is returned we will consider next appropriate course of treatment. Estimated blood loss: Minimal No specimens were taken      XR chest 1 view portable     Result Date: 4/25/2024  Narrative: XR CHEST PORTABLE INDICATION: exertional dyspnea. COMPARISON: Chest radiograph dated 9/20/2022. FINDINGS: No focal airspace consolidation, pleural effusion, signs of congestion, or pneumothorax. Cardiomediastinal silhouette is unremarkable. Left shoulder prosthesis again noted.      Impression: No acute cardiopulmonary disease. Workstation performed: [id number]         LABS         Results from last 7 days   Lab Units 05/20/24  0433 05/18/24  0209 05/17/24  2206   WBC Thousand/uL 7.85 7.90 7.82   HEMOGLOBIN g/dL 10.9* 11.2* 11.8   HEMATOCRIT % 33.7* 34.2* 35.5   MCV fL 94 93 92   PLATELETS Thousands/uL 180 204 198   INR    --   --  1.03              Results from last 7 days   Lab Units 05/20/24  0433 05/19/24  0500 05/18/24  0209 05/17/24  2206   SODIUM mmol/L 139 139 134* 134*   POTASSIUM mmol/L 3.5 3.8 3.9 3.7   CHLORIDE mmol/L 108  "108 101 100   CO2 mmol/L 25 26 26 28   BUN mg/dL 23 21 29* 26*   CREATININE mg/dL 0.69 0.81 0.86 0.88   CALCIUM mg/dL 8.8 8.5 9.5 9.3   ALBUMIN g/dL  --   --   --  4.1   TOTAL BILIRUBIN mg/dL  --   --   --  0.55   ALK PHOS U/L  --   --   --  49   ALT U/L  --   --   --  15   AST U/L  --   --   --  22   EGFR ml/min/1.73sq m 77 64 60 58   GLUCOSE RANDOM mg/dL 85 81 95 84            Results from last 7 days   Lab Units 05/18/24  0033 05/17/24 2206   HS TNI 0HR ng/L  --  6   HS TNI 2HR ng/L 6  --                                       Cultures:          Invalid input(s): \"URIBILINOGEN\"             Results from last 7 days   Lab Units 05/17/24 2206   INFLUENZA A PCR   Negative            Condition at Discharge:  good        Discharge instructions/Information to patient and family:   See after visit summary for information provided to patient and family.       Provisions for Follow-Up Care:  See after visit summary for information related to follow-up care and any pertinent home health orders.       Disposition:      Home        Discharge Statement:  I spent 37 minutes discharging the patient. This time was spent on the day of discharge. I had direct contact with the patient on the day of discharge. Greater than 50% of the total time was spent examining patient, answering all patient questions, arranging and discussing plan of care with patient as well as directly providing post-discharge instructions.  Additional time then spent on discharge activities.     Pt dtr updated     Discharge Medications:  See after visit summary for reconciled discharge medications provided to patient and family.       ** Please Note: This note has been constructed using a voice recognition system **    "

## 2024-05-23 NOTE — PROGRESS NOTES
"Cardiology Office Follow Up  Aide Lyonsp  11/5/1934  [id number]      ASSESSMENT:  Syncope  5/20/2024 echo: EF 65%, normal wall motion and normal diastolic function, mild regurgitant valvular disease  Orthostatic hypotension  Conduction system disease  First-degree AV block with right bundle branch block  Hypothyroidism  Atypical chest pain suspect musculoskeletal    PLAN/ DISCUSSION:  She has had no further episodes of dizziness or presyncope.  She is accompanied by her daughter today and they report that she is increasing intake of fluids and having meals more regularly.  She has gained approximately 8 pounds intentionally.  I suspect her symptoms prior to her hospitalization were related to dehydration and orthostatic hypotension.  At this point if she has any further episodes of dizziness we could check a 14-day ZIO monitor but for now we will hold off.  No changes were made to medications today.  She is going to follow-up with her previously scheduled appointment with Dr. Rowan in July.  The chest pain that she describes sounds musculoskeletal and is exacerbated by moving her right arm and relieved by stretching.  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    Interval History/ HPI:   89-year-old female recently hospitalized for syncope.  This was thought to be due to orthostatic hypotension.  We saw her in consultation nonetheless and arrange for an echocardiogram which was unremarkable.  She is here today for follow-up.    She is accompanied by her daughter today.  She reports feeling generally well as above.  She is increasing oral intake and doing well.     Vitals:  /62 (BP Location: Left arm, Patient Position: Sitting, Cuff Size: Standard)   Ht 5' 4\" (1.626 m)   BMI 17.85 kg/m²      Past Medical History:   Diagnosis Date    Anxiety     Disease of thyroid gland      Social History     Socioeconomic History    Marital status:      Spouse name: Not on file    Number " of children: Not on file    Years of education: Not on file    Highest education level: Not on file   Occupational History    Not on file   Tobacco Use    Smoking status: Never    Smokeless tobacco: Never   Vaping Use    Vaping status: Never Used   Substance and Sexual Activity    Alcohol use: Not Currently    Drug use: Never    Sexual activity: Not Currently   Other Topics Concern    Not on file   Social History Narrative    Not on file     Social Determinants of Health     Financial Resource Strain: Not on file   Food Insecurity: Not on file   Transportation Needs: Not on file   Physical Activity: Not on file   Stress: Not on file   Social Connections: Not on file   Intimate Partner Violence: Not on file   Housing Stability: Not on file      No family history on file.  Past Surgical History:   Procedure Laterality Date     SECTION      SHOULDER SURGERY         Current Outpatient Medications:     levothyroxine 25 mcg tablet, Take 25 mcg by mouth daily, Disp: , Rfl:     Multiple Vitamins-Minerals (CENTRUM SILVER PO), Take by mouth, Disp: , Rfl:     Turmeric (QC TUMERIC COMPLEX PO), Take by mouth, Disp: , Rfl:       Review of Systems:  Review of Systems   Constitutional:  Negative for chills and fever.   HENT:  Negative for ear pain and sore throat.    Eyes:  Negative for pain and visual disturbance.   Respiratory:  Negative for cough and shortness of breath.    Cardiovascular:  Negative for chest pain and palpitations.   Gastrointestinal:  Negative for abdominal pain and vomiting.   Genitourinary:  Negative for dysuria and hematuria.   Musculoskeletal:  Negative for arthralgias and back pain.   Skin:  Negative for color change and rash.   Neurological:  Negative for seizures and syncope.   All other systems reviewed and are negative.        Physical Exam:  Physical Exam  Constitutional:       General: She is not in acute distress.     Appearance: Normal appearance. She is not ill-appearing.   HENT:       Head: Normocephalic and atraumatic.      Right Ear: External ear normal.      Left Ear: External ear normal.      Mouth/Throat:      Pharynx: No oropharyngeal exudate or posterior oropharyngeal erythema.   Eyes:      General:         Right eye: No discharge.         Left eye: No discharge.      Conjunctiva/sclera: Conjunctivae normal.      Pupils: Pupils are equal, round, and reactive to light.   Cardiovascular:      Rate and Rhythm: Normal rate and regular rhythm.      Pulses: Normal pulses.      Heart sounds: Normal heart sounds. No murmur heard.     No friction rub. No gallop.   Pulmonary:      Effort: Pulmonary effort is normal.      Breath sounds: Normal breath sounds. No wheezing, rhonchi or rales.   Abdominal:      General: Abdomen is flat. There is no distension.      Palpations: Abdomen is soft.      Tenderness: There is no abdominal tenderness.   Musculoskeletal:         General: No swelling, tenderness or deformity. Normal range of motion.      Cervical back: Normal range of motion.   Skin:     General: Skin is warm and dry.   Neurological:      General: No focal deficit present.      Mental Status: She is alert and oriented to person, place, and time.         This note was completed in part utilizing [employer] Software.  Grammatical errors, random word insertions, spelling mistakes, and incomplete sentences can be an occasional consequence of this system secondary to software limitations, ambient noise, and hardware issues.  If you have any questions or concerns about the content, text, or information contained within the body of this dictation, please contact the provider for clarification.

## 2024-05-28 ENCOUNTER — OFFICE VISIT (OUTPATIENT)
Dept: CARDIOLOGY CLINIC | Facility: CLINIC | Age: 89
End: 2024-05-28
Payer: COMMERCIAL

## 2024-05-28 ENCOUNTER — TELEPHONE (OUTPATIENT)
Age: 89
End: 2024-05-28

## 2024-05-28 VITALS — HEIGHT: 64 IN | SYSTOLIC BLOOD PRESSURE: 126 MMHG | BODY MASS INDEX: 17.85 KG/M2 | DIASTOLIC BLOOD PRESSURE: 62 MMHG

## 2024-05-28 DIAGNOSIS — R55 SYNCOPE, UNSPECIFIED SYNCOPE TYPE: Primary | ICD-10-CM

## 2024-05-28 DIAGNOSIS — N18.31 CHRONIC KIDNEY DISEASE, STAGE 3A (HCC): ICD-10-CM

## 2024-05-28 PROCEDURE — 99214 OFFICE O/P EST MOD 30 MIN: CPT | Performed by: PHYSICIAN ASSISTANT

## 2024-05-28 RX ORDER — MUPIROCIN CALCIUM 20 MG/G
CREAM TOPICAL
COMMUNITY
End: 2024-05-28

## 2024-05-28 RX ORDER — MELOXICAM 7.5 MG/1
TABLET ORAL
COMMUNITY
End: 2024-05-28

## 2024-05-28 RX ORDER — HYDROCODONE BITARTRATE AND ACETAMINOPHEN 5; 325 MG/1; MG/1
TABLET ORAL
COMMUNITY
End: 2024-05-28

## 2024-05-28 RX ORDER — OXYCODONE HYDROCHLORIDE 5 MG/1
TABLET ORAL
COMMUNITY
End: 2024-05-28

## 2024-05-28 RX ORDER — PROPRANOLOL HCL 60 MG
CAPSULE, EXTENDED RELEASE 24HR ORAL
COMMUNITY
End: 2024-05-28

## 2024-05-28 RX ORDER — ALPRAZOLAM 0.5 MG/1
TABLET ORAL
COMMUNITY
End: 2024-05-28

## 2024-05-28 RX ORDER — LEVOTHYROXINE SODIUM 0.03 MG/1
TABLET ORAL
COMMUNITY
End: 2024-05-28

## 2024-05-28 RX ORDER — TRIAMTERENE AND HYDROCHLOROTHIAZIDE 37.5; 25 MG/1; MG/1
CAPSULE ORAL
COMMUNITY
End: 2024-05-28

## 2024-05-28 NOTE — PATIENT INSTRUCTIONS
No changes to medications today, please continue everything the same. If you need refills of your cardiac medications prescribed by our office, please call us ahead of time so that they can be filled.   We will see you back in the office in July with Dr. Rowan. If you have any questions or concerns in the mean time please do not hesitate to call our office.

## 2024-05-28 NOTE — TELEPHONE ENCOUNTER
LIZZY    S/w shirley, pt's granddaughter. Per Shirley, the pt continues to have relief of her arm pain s/p mbb #1. Pt is scheduled for mbb #2 on thursday 5/30/24. Confirmed with Shirley, the pt's pain needs to be 5/10 or more in order to assess improvement during the pain diary. Advised Shirley, if the pt is not having pain, ok to cancel the procedure and cb if the pain returns / becomes consistent. Shirley verbalized understanding and appreciation. Stated that she will cb if she needs to cancel.

## 2024-05-28 NOTE — TELEPHONE ENCOUNTER
Caller: Aide     Doctor: Gerry    Reason for call: Patient grand daughter calling asking to speak to nurse regarding grandmother's procedure please advise     Call back#: [phone]

## 2024-05-28 NOTE — TELEPHONE ENCOUNTER
Caller: peggy Berrios's grand daughter    Doctor: Gerry    Reason for call: please cx procedure that is scheduled for 5/30.  They do not want to r/s at this time    Call back#: [phone]

## 2024-07-10 ENCOUNTER — OFFICE VISIT (OUTPATIENT)
Dept: PAIN MEDICINE | Facility: CLINIC | Age: 89
End: 2024-07-10
Payer: COMMERCIAL

## 2024-07-10 VITALS
HEIGHT: 64 IN | TEMPERATURE: 97.2 F | SYSTOLIC BLOOD PRESSURE: 124 MMHG | DIASTOLIC BLOOD PRESSURE: 68 MMHG | WEIGHT: 110 LBS | BODY MASS INDEX: 18.78 KG/M2

## 2024-07-10 DIAGNOSIS — M25.511 CHRONIC RIGHT SHOULDER PAIN: ICD-10-CM

## 2024-07-10 DIAGNOSIS — Z96.611 HISTORY OF RIGHT SHOULDER REPLACEMENT: ICD-10-CM

## 2024-07-10 DIAGNOSIS — G89.29 CHRONIC RIGHT SHOULDER PAIN: ICD-10-CM

## 2024-07-10 DIAGNOSIS — M54.2 NECK PAIN: ICD-10-CM

## 2024-07-10 DIAGNOSIS — M47.812 CERVICAL SPONDYLOSIS: Primary | ICD-10-CM

## 2024-07-10 PROCEDURE — G2211 COMPLEX E/M VISIT ADD ON: HCPCS | Performed by: PHYSICIAN ASSISTANT

## 2024-07-10 PROCEDURE — 99214 OFFICE O/P EST MOD 30 MIN: CPT | Performed by: PHYSICIAN ASSISTANT

## 2024-07-10 NOTE — PROGRESS NOTES
Assessment:  1. Cervical spondylosis    2. Neck pain    3. Chronic right shoulder pain    4. History of right shoulder replacement        Plan:  While the patient was in the office today, I did have a thorough conversation regarding their chronic pain syndrome, medication management, and treatment plan options.    Patient seems to have 2 separate issues contributing to her chronic pain state.  She has significant tenderness to palpation along the biceps tendon and at this point I have recommended a consultation with our orthopedic department.  Patient has a history of total shoulder replacement; she does not wish to return to the original surgeon.  I have placed a referral on today's visit.    Patient also continues with neck pain secondary to cervical spondylosis.  I do feel she is a candidate for repeat diagnostic right C4-6 medial branch blocks.  Patient had greater than 80% relief of neck pain with the first diagnostic medial branch block and sustained relief for an unusually long period of time following that. If the patient demonstrates appropriate response to medial branch blockade we will schedule for radiofrequency ablation to provide long-term relief. Complete risks and benefits including bleeding, infection, tissue reaction, nerve injury and allergic reaction were discussed. The approach was demonstrated using models and literature was provided. Verbal and written consent was obtained.    The patient was advised to contact the office should their symptoms worsen in the interim. The patient was agreeable and verbalized an understanding.        History of Present Illness:    The patient is a 89 y.o. female last seen on 4/29/2024 who presents for a follow up office visit in regards to chronic neck pain secondary to cervical spondylosis and cervical spinal stenosis as well as chronic right shoulder pain with a history of rotator cuff tear and right total shoulder replacement surgery.  The patient currently  reports increasing neck pain as well as a right shoulder pain that she presently rates a 7 out of 10 and describes both areas as constant and cramping in nature.  Patient previously had a series of 3 cervical epidural steroid injections in  with no improvement.  On 2024 the patient underwent right C4-6 medial branch blocks and reported near complete resolution of neck pain, greater than 80%, and she sustained relief up until .  Patient is aware this is an unusual response to the medial branch block.  She notes that during that time of relief she had mild to moderate improvement of the right proximal arm pain.  She is reporting significant pain along the biceps region and the feeling of tightness in the musculature.  Patient has limited mobility of the arm as well.  Patient had surgery in  on the shoulder with Dr. Beatty and the patient prefers not to follow-up with him.    I have personally reviewed and/or updated the patient's past medical history, past surgical history, family history, social history, current medications, allergies, and vital signs today.       Review of Systems:    Review of Systems   Respiratory:  Negative for shortness of breath.    Cardiovascular:  Negative for chest pain.   Gastrointestinal:  Negative for constipation, diarrhea, nausea and vomiting.   Musculoskeletal:  Negative for arthralgias, gait problem, joint swelling (Joint stiffness) and myalgias.   Skin:  Negative for rash.   Neurological:  Negative for dizziness, seizures and weakness.   All other systems reviewed and are negative.        Past Medical History:   Diagnosis Date   • Anxiety    • Disease of thyroid gland        Past Surgical History:   Procedure Laterality Date   •  SECTION     • SHOULDER SURGERY         History reviewed. No pertinent family history.    Social History     Occupational History   • Not on file   Tobacco Use   • Smoking status: Never   • Smokeless tobacco: Never   Vaping Use   •  "Vaping status: Never Used   Substance and Sexual Activity   • Alcohol use: Not Currently   • Drug use: Never   • Sexual activity: Not Currently         Current Outpatient Medications:   •  levothyroxine 25 mcg tablet, Take 25 mcg by mouth daily, Disp: , Rfl:   •  Multiple Vitamins-Minerals (CENTRUM SILVER PO), Take by mouth, Disp: , Rfl:   •  Turmeric (QC TUMERIC COMPLEX PO), Take by mouth (Patient not taking: Reported on 7/10/2024), Disp: , Rfl:     Allergies   Allergen Reactions   • Hylan G-F 20 Other (See Comments)   • Influenza Virus Vaccine Other (See Comments)       Physical Exam:    /68 (BP Location: Left arm, Patient Position: Sitting, Cuff Size: Standard)   Temp (!) 97.2 °F (36.2 °C)   Ht 5' 4\" (1.626 m)   Wt 49.9 kg (110 lb)   BMI 18.88 kg/m²     Constitutional:normal, well developed, well nourished, alert, in no distress and non-toxic and no overt pain behavior.  Eyes:anicteric  HEENT:grossly intact  Pulmonary:even and unlabored  Cardiovascular:No edema or pitting edema present  Skin:Normal without rashes or lesions and well hydrated  Psychiatric:Mood and affect appropriate  Neurologic:Cranial Nerves II-XII grossly intact  Musculoskeletal: Tenderness to palpation along the right cervical facet joints, limited range of motion with right rotation and extension, limited range of motion of the right shoulder, significant tenderness to palpation along the biceps.      Imaging  FL spine and pain procedure    (Results Pending)         Orders Placed This Encounter   Procedures   • FL spine and pain procedure   • Ambulatory referral to Orthopedic Surgery       "

## 2024-07-24 ENCOUNTER — OFFICE VISIT (OUTPATIENT)
Dept: OBGYN CLINIC | Facility: CLINIC | Age: 89
End: 2024-07-24
Payer: COMMERCIAL

## 2024-07-24 ENCOUNTER — NURSE TRIAGE (OUTPATIENT)
Age: 89
End: 2024-07-24

## 2024-07-24 VITALS — BODY MASS INDEX: 18.78 KG/M2 | HEIGHT: 64 IN | WEIGHT: 110 LBS

## 2024-07-24 DIAGNOSIS — G89.29 CHRONIC RIGHT SHOULDER PAIN: ICD-10-CM

## 2024-07-24 DIAGNOSIS — M25.511 CHRONIC RIGHT SHOULDER PAIN: ICD-10-CM

## 2024-07-24 DIAGNOSIS — N64.4 BREAST PAIN, RIGHT: Primary | ICD-10-CM

## 2024-07-24 DIAGNOSIS — Z96.611 HISTORY OF RIGHT SHOULDER REPLACEMENT: ICD-10-CM

## 2024-07-24 PROCEDURE — 99204 OFFICE O/P NEW MOD 45 MIN: CPT | Performed by: FAMILY MEDICINE

## 2024-07-24 NOTE — TELEPHONE ENCOUNTER
"Patient is calling in, she is being referred by her ortho doc. She had an appt today and discussed that she is getting right breast tightness. She is unsure if it is steaming from her shoulder/arm or her breast. She denies any pain, lumps, redness or nipple discharge. She has breast implants that were put in 2006. She states she had lumps that needed to be drained so she was told to get implants. These are her second set. She has never had mammogram- she was told she didn't need them since she has implants in. She states her mom and all her aunts had breast cancer. Appt made for 7/26    Reason for Disposition  • Breast implants and pain, asymmetry, or other concerns    Answer Assessment - Initial Assessment Questions  1. SYMPTOM: \"What's the main symptom you're concerned about?\"  (e.g., lump, pain, rash, nipple discharge)      Arm feels tight and then goes into breast tissue-   Has breast implants in since 2006  2. LOCATION: \"Where is the symptoms located?\"      Right breast  3. ONSET: \"When did symptoms  start?\"      \"For a while\"   4. PRIOR HISTORY: \"Do you have any history of prior problems with your breasts?\" (e.g., lumps, cancer, fibrocystic breast disease)      Denies, hx of lumps in both breasts that need to be drained. Mom and aunts all had breast cancer   5. CAUSE: \"What do you think is causing this symptom?\"      Unsure   6. OTHER SYMPTOMS: \"Do you have any other symptoms?\" (e.g., fever, breast pain, redness or rash, nipple discharge)      Denies    Protocols used: Breast Symptoms-ADULT-[place]    "

## 2024-07-24 NOTE — TELEPHONE ENCOUNTER
Regarding: np-right breat pain, has implants  ----- Message from Sandrine PHELPS sent at 7/24/2024  4:11 PM EDT -----  NP patient had a should replacement 9 years ago, and is now experiencing tightness in her right arm and breast implant area, referred by Antoni Mai.      Tried CTS  Roberto Sell

## 2024-07-24 NOTE — PROGRESS NOTES
1. Breast pain, right  Ambulatory Referral to Obstetrics / Gynecology      2. Chronic right shoulder pain  Ambulatory referral to Orthopedic Surgery    Ambulatory Referral to Orthopedic Surgery    US MSK limited      3. History of right shoulder replacement  Ambulatory referral to Orthopedic Surgery    Ambulatory Referral to Orthopedic Surgery        Orders Placed This Encounter   Procedures    US MSK limited    Ambulatory Referral to Orthopedic Surgery    Ambulatory Referral to Obstetrics / Gynecology        IMAGING STUDIES: (I personally reviewed images in PACS and report):   xray right shoulder 4/2/24:  Right reverse total shoulder      PAST REPORTS:        ASSESSMENT/PLAN:  Complex right arm and shoulder pain  Right reverse total shoulder  Improvement with cervical block pain mangement  Right breast pain  Breast implants    DDX  Referred pain neck  Biceps pathology    Repeat X-ray next visit: None    Return for Follow-up with Surgeon.    Patient instructions below verbally summarized in person during encounter:  Patient Instructions   I explained to patient and family that since patient had significant relief with cervical block pain management including relief of her right arm pain likely her arm pain is referred pain.  However, since she does have pain along the biceps I recommended an ultrasound diagnostic of the right shoulder and biceps.  I recommend follow-up with one of our orthopedic surgeons who performs shoulder replacements for an evaluation as well.    In addition, patient is complained of tightness in the right breast region and I have recommended evaluation with gynecology.      __________________________________________________________________________    HISTORY OF PRESENT ILLNESS:  PSH Right reverse total shoulder  C/o right shoulder pain x 9 years since reverse replacement.  She never had complete relief of her right shoulder pain after her shoulder replacement.  She complains of pain rating  "from the neck down the arm towards the biceps.  She also feels tightness in her right breast region and has difficulty even raising her arm to feed herself.    Recently patient has seen pain management who did perform cervical blocks resulting in relief of patient's right arm pain.  She is scheduled for second block on .  However, she is concerned about concomitant right arm pathology in addition to her cervical neck arthritis.    She denies any recent trauma.    Intermittent numbness of the right hand.            Review of Systems      Following history reviewed and update:    Past Medical History:   Diagnosis Date    Anxiety     Disease of thyroid gland      Past Surgical History:   Procedure Laterality Date     SECTION      SHOULDER SURGERY       Social History   Social History     Substance and Sexual Activity   Alcohol Use Not Currently     Social History     Substance and Sexual Activity   Drug Use Never     Social History     Tobacco Use   Smoking Status Never   Smokeless Tobacco Never     No family history on file.  Allergies   Allergen Reactions    Hylan G-F 20 Other (See Comments)    Influenza Virus Vaccine Other (See Comments)          Physical Exam  Ht 5' 4\" (1.626 m)   Wt 49.9 kg (110 lb)   BMI 18.88 kg/m²         Ortho Exam  RIGHT SHOULDER:  Erythema: no  Swelling: no  Increased Warmth: no    Tenderness: nonspecific tenderness right arm. Intermittent tenderness right biceps    ROM  Touchdown sign: 120  External Rotation:20  Internal Rotation: 20    Strength  Abduction: 5/5  ER: 5/5  IR: 5/5    Drop-Arm: negative  Emptycan: +    Neer: +    LEFT SHOULDER:  Strength  Abduction: 5/5  ER: 5/5  IR: 5/5    ROM  Touchdown sign: intact    Empty can: negative     Sensation UE Bilateral:  C5: normal  C6: normal  C7: normal  C8: normal  T1: normal  Strength UE: 5/5 elbow, wrist, fingers bilateral   "     __________________________________________________________________________  Procedures

## 2024-07-24 NOTE — PATIENT INSTRUCTIONS
I explained to patient and family that since patient had significant relief with cervical block pain management including relief of her right arm pain likely her arm pain is referred pain.  However, since she does have pain along the biceps I recommended an ultrasound diagnostic of the right shoulder and biceps.  I recommend follow-up with one of our orthopedic surgeons who performs shoulder replacements for an evaluation as well.    In addition, patient is complained of tightness in the right breast region and I have recommended evaluation with gynecology.

## 2024-07-28 NOTE — PROGRESS NOTES
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 who presents today with complaint of pain in her right breast   Her general medical history has been reviewed and she reports it as follows:    Past Medical History:   Diagnosis Date   • Anxiety    • Disease of thyroid gland      Past Surgical History:   Procedure Laterality Date   • AUGMENTATION BREAST     •  SECTION     • MASTECTOMY     • SHOULDER SURGERY       OB History        7    Para   3    Term                AB        Living           SAB        IAB        Ectopic        Multiple        Live Births   3           Obstetric Comments   C/s x 3             Social History     Tobacco Use   • Smoking status: Never   • Smokeless tobacco: Never   Vaping Use   • Vaping status: Never Used   Substance Use Topics   • Alcohol use: Not Currently   • Drug use: Never       Current Outpatient Medications   Medication Instructions   • levothyroxine 25 mcg, Oral, Daily   • Multiple Vitamins-Minerals (CENTRUM SILVER PO) Oral   • Turmeric (QC TUMERIC COMPLEX PO) Take by mouth       History of Present Illness:   Pt presents  today w   9 years of  shoulder, neck and  right breast pain.  + hx of  b/l mastectomy and  2 sets of  breast implants.  Last  set placed in   by Dr Piedra  ( pt has  card in wallet)  .       Review of Systems:  Review of Systems   Constitutional: Negative.    Cardiovascular:  Negative for chest pain.   Musculoskeletal:  Positive for arthralgias, back pain, joint swelling and neck pain.        Right breast pain    Skin: Negative.    Allergic/Immunologic: Negative.    Neurological: Negative.    Hematological: Negative.    Psychiatric/Behavioral: Negative.         Physical Exam:  BP 98/72 (BP Location: Left arm, Patient Position: Sitting, Cuff Size: Standard)   Ht 5' 4\" (1.626 m)   Wt 49.4 kg (109 lb)   BMI 18.71 kg/m²   Physical Exam  Constitutional:       Appearance: Normal appearance.   Genitourinary:      " Genitourinary Comments: Breast not  symmetrical    left  areola   at  1 o'clock   ,  Implants   fixed  , firm  non  mobile.  No dimpling or  retracting  no dc    Breasts:     Right: Breast implant present. No swelling, bleeding, inverted nipple, mass, nipple discharge, skin change or tenderness.      Left: Nipple discharge and breast implant present. No swelling, bleeding, skin change or tenderness.   HENT:      Head: Normocephalic.   Chest:       Musculoskeletal:      Cervical back: Normal range of motion and neck supple.   Lymphadenopathy:      Upper Body:      Right upper body: No supraclavicular or axillary adenopathy.      Left upper body: No supraclavicular or axillary adenopathy.   Neurological:      Mental Status: She is alert.   Vitals and nursing note reviewed.         Assessment:   1. Mastodynia right breast,  breast implants placed in 2006     Plan:   Hx of   b/l  mastectomy   ( pt states all tissue was scraped out).  Breast implants since 2006.   Pin in right breast and neck that  origin is unknown.  Doing PT and  acupuncture.   Dw pt and daughter tht   we may need to  a plastics for possible removal.   To get diagnostic  breast ultrasound b/l and will fu after.  I have spent a total time of 25 minutes in caring for this patient on the day of the visit/encounter including Diagnostic results, Prognosis, Risks and benefits of tx options, Instructions for management, Patient and family education, Counseling / Coordination of care, Documenting in the medical record, Reviewing / ordering tests, medicine, procedures  , and Obtaining or reviewing history  .     Reviewed with patient that [facility], but that they will not necessarily be reviewed by me immediately.  Explained that I will review results at my earliest opportunity and contact patient appropriately.

## 2024-07-29 ENCOUNTER — OFFICE VISIT (OUTPATIENT)
Dept: OBGYN CLINIC | Facility: CLINIC | Age: 89
End: 2024-07-29
Payer: COMMERCIAL

## 2024-07-29 VITALS
BODY MASS INDEX: 18.61 KG/M2 | DIASTOLIC BLOOD PRESSURE: 72 MMHG | WEIGHT: 109 LBS | SYSTOLIC BLOOD PRESSURE: 98 MMHG | HEIGHT: 64 IN

## 2024-07-29 DIAGNOSIS — N64.4 MASTODYNIA OF RIGHT BREAST: Primary | ICD-10-CM

## 2024-07-29 DIAGNOSIS — Z98.82 H/O BILATERAL BREAST IMPLANTS: ICD-10-CM

## 2024-07-29 DIAGNOSIS — N64.4 BREAST PAIN, RIGHT: ICD-10-CM

## 2024-07-29 PROCEDURE — 99213 OFFICE O/P EST LOW 20 MIN: CPT | Performed by: OBSTETRICS & GYNECOLOGY

## 2024-08-05 ENCOUNTER — HOSPITAL ENCOUNTER (OUTPATIENT)
Dept: ULTRASOUND IMAGING | Facility: HOSPITAL | Age: 89
Discharge: HOME | End: 2024-08-05
Payer: COMMERCIAL

## 2024-08-05 DIAGNOSIS — G89.29 CHRONIC RIGHT SHOULDER PAIN: ICD-10-CM

## 2024-08-05 DIAGNOSIS — M25.511 CHRONIC RIGHT SHOULDER PAIN: ICD-10-CM

## 2024-08-05 PROCEDURE — 76882 US LMTD JT/FCL EVL NVASC XTR: CPT

## 2024-08-08 ENCOUNTER — OFFICE VISIT (OUTPATIENT)
Dept: OBGYN CLINIC | Facility: CLINIC | Age: 89
End: 2024-08-08
Payer: COMMERCIAL

## 2024-08-08 VITALS
SYSTOLIC BLOOD PRESSURE: 120 MMHG | BODY MASS INDEX: 18.61 KG/M2 | RESPIRATION RATE: 16 BRPM | HEART RATE: 61 BPM | WEIGHT: 109 LBS | HEIGHT: 64 IN | DIASTOLIC BLOOD PRESSURE: 67 MMHG

## 2024-08-08 DIAGNOSIS — Z96.611 STATUS POST REVERSE ARTHROPLASTY OF RIGHT SHOULDER: ICD-10-CM

## 2024-08-08 DIAGNOSIS — M54.12 RADICULOPATHY, CERVICAL REGION: Primary | ICD-10-CM

## 2024-08-08 PROCEDURE — 99214 OFFICE O/P EST MOD 30 MIN: CPT | Performed by: STUDENT IN AN ORGANIZED HEALTH CARE EDUCATION/TRAINING PROGRAM

## 2024-08-08 NOTE — LETTER
August 8, 2024     Antoni Thornton III, DO  [address] [facility]  [address]  [place] [id number]    Patient: Aide Yanes   YOB: 1934   Date of Visit: 8/8/2024       Dear Dr. Thornton:    Thank you for referring Aide Yanes to me for evaluation. Below are my notes for this consultation.    If you have questions, please do not hesitate to call me. I look forward to following your patient along with you.         Sincerely,        Markus Cannon DO        CC: No Recipients    Markus Cannon DO  8/8/2024 12:32 PM  Sign when Signing Visit  Ortho Sports Medicine Shoulder New Patient Visit     Assesment:   89 y.o. female cervical radiculopathy likely referring pain to right upper extremity.  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 We discussed no treatment, non-operative treatment, and operative treatment.    Aide presents today for initial consultation cervical radiculopathy likely referring pain to right upper extremity with stable prior right reverse total shoulder arthroplasty. I discussed with both her and her granddaughter at today's visit that her images from her right shoulder do demonstrate stable appearance of reverse total shoulder arthroplasty.  I discussed with both of them at today's visit that her shoulder does not demonstrate any signs of infection, however given her ongoing pain and discomfort we could proceed forward with further workup including lab work and CT scan. I have a low index of suspicion for infection.  Given that she has had significant improvement from cervical injections from Dr. Chaudhry, this would suggest a cervical etiology of her pain. I discussed we can order the lab work and CT scan at any point time if she wishes for further workup of her shoulder.  She can continue ice and over-the-counter medication as needed for pain relief.  She  can follow-up with me on an as-needed basis.    Conservative treatment:    Ice to shoulder 1-2 times daily, for 20 minutes at a time.  PT for ROM and strengthening to shoulder, rotator cuff, scapular stabilizers.  Let pain guide return to activities.  Continue treatment with Dr. Garrett for cervical radiculopathy.  Can consider lab work if wishes to further workup for low-grade infection.      Imaging:    All imaging from today was reviewed by myself and explained to the patient.   Discussed can consider CT scan for further evaluation and loosening, however she wishes to hold off at this point time.      Injection:    No Injection planned at this time.      Surgery:     No surgery is recommended at this point, continue with conservative treatment plan as noted.      Follow up:    Return if symptoms worsen or fail to improve.        Chief Complaint   Patient presents with   • Right Shoulder - Pain       History of Present Illness:    The patient is a 89 y.o., right hand dominant female whose occupation is retired, referred to me by Dr. Mai, seen in clinic for consultation of right shoulder pain.  She presents in the office today with her granddaughter.  She states that she has been having ongoing right shoulder pain for several years without any specific injury or trauma.  She states that her pain is predominantly in the anterior aspect of her shoulder and radiates from her neck into her thumb.  She states that she has seen Dr. Garrett in the past for diagnostic injections which provided her significant improvement of her symptoms for 3+ months.  She denies any fever or chills.  She denies any redness or warmth along her incision.  She denies any fall or setback after her surgery.  She denies any clicking or catching sensation felt in her shoulder.  She states that her pain is after periods of rest and occasionally laying down.      Shoulder Surgical History:  Prior reverse total shoulder arthroplasty 10+ years ago  by Dr. Beatty for irreparable rotator cuff tear    Past Medical, Social and Family History:  Past Medical History:   Diagnosis Date   • Anxiety    • Disease of thyroid gland      Past Surgical History:   Procedure Laterality Date   • AUGMENTATION BREAST     •  SECTION     • MASTECTOMY     • SHOULDER SURGERY       Allergies   Allergen Reactions   • Hylan G-F 20 Other (See Comments)   • Influenza Virus Vaccine Other (See Comments)     Current Outpatient Medications on File Prior to Visit   Medication Sig Dispense Refill   • levothyroxine 25 mcg tablet Take 25 mcg by mouth daily     • Multiple Vitamins-Minerals (CENTRUM SILVER PO) Take by mouth     • Turmeric (QC TUMERIC COMPLEX PO) Take by mouth (Patient not taking: Reported on 7/10/2024)       No current facility-administered medications on file prior to visit.     Social History     Socioeconomic History   • Marital status:      Spouse name: Not on file   • Number of children: Not on file   • Years of education: Not on file   • Highest education level: Not on file   Occupational History   • Not on file   Tobacco Use   • Smoking status: Never   • Smokeless tobacco: Never   Vaping Use   • Vaping status: Never Used   Substance and Sexual Activity   • Alcohol use: Not Currently   • Drug use: Never   • Sexual activity: Not Currently   Other Topics Concern   • Not on file   Social History Narrative   • Not on file     Social Determinants of Health     Financial Resource Strain: Not on file   Food Insecurity: Not on file   Transportation Needs: Not on file   Physical Activity: Not on file   Stress: Not on file   Social Connections: Not on file   Intimate Partner Violence: Not on file   Housing Stability: Not on file         I have reviewed the past medical, surgical, social and family history, medications and allergies as documented in the EMR.    Review of systems: ROS is negative other than that noted in the HPI.  Constitutional: Negative for fatigue and  "fever.   HENT: Negative for sore throat.    Respiratory: Negative for shortness of breath.    Cardiovascular: Negative for chest pain.   Gastrointestinal: Negative for abdominal pain.   Endocrine: Negative for cold intolerance and heat intolerance.   Genitourinary: Negative for flank pain.   Musculoskeletal: Negative for back pain.   Skin: Negative for rash.   Allergic/Immunologic: Negative for immunocompromised state.   Neurological: Negative for dizziness.   Psychiatric/Behavioral: Negative for agitation.      Physical Exam:    Blood pressure 120/67, pulse 61, resp. rate 16, height 5' 4\" (1.626 m), weight 49.4 kg (109 lb).    General/Constitutional: NAD, well developed, well nourished  HENT: Normocephalic, atraumatic  CV: Intact distal pulses, regular rate  Resp: No respiratory distress or labored breathing  Lymphatic: No lymphadenopathy palpated  Neuro: Alert and Oriented x 3, no focal deficits  Psych: Normal mood, normal affect, normal judgement, normal behavior  Skin: Warm, dry, no rashes, no erythema      Shoulder focused exam:     Visual inspection of the shoulder demonstrates normal contour without atrophy  No evidence of scapular dyskinesia or atrophy.  No scapular winging  Active and passive range of motion demonstrates forward flexion to 150, abduction to 90, external rotation is 60 with the arm the side, internal rotation to L1   4 out of 5 to resisted forward flexion, 4 out of 5 to abduction, 4 out of 5 to external rotation, 5 out of 5 to internal rotation  Incision appears healed with no drainage or erythema.  No warmth to touch.  No visualized redness  Biceps contour is symmetrical to contralateral side  No tenderness to palpation at the distal clavicle, AC joint, acromion, or scapular spine          UE NV Exam: +2 Radial pulses bilaterally  Sensation intact to light touch C5-T1 bilaterally, Radial/median/ulnar nerve motor intact      Bilateral elbow, wrist, and and forearm ROM full, painless with " passive ROM, no ttp or crepitance throughout extremities below shoulder joint    Cervical ROM is full without pain, numbness or tingling      Shoulder Imaging    X-rays of the right shoulder were reviewed, which demonstrate a cemented acromion style reverse shoulder arthroplasty.  There is evidence of potential scapular notching along the inferomedial scapular pillar however I do not appreciate any obvious loosening of the glenosphere or baseplate.  There is no loosening of the humeral component and has a stable cement mantle.  I have reviewed the radiology report and agree with their impression.    Ultrasound of right shoulder demonstrates postsurgical changes of reverse total shoulder arthroplasty with prior biceps tenodesis.  I have reviewed and agree with the radiologist interpretation.      Scribe Attestation      I,:  Tommy Ruby am acting as a scribe while in the presence of the attending physician.:       I,:  Markus Cannon, DO personally performed the services described in this documentation    as scribed in my presence.:

## 2024-08-08 NOTE — PROGRESS NOTES
Ortho Sports Medicine Shoulder New Patient Visit     Assesment:   89 y.o. female cervical radiculopathy likely referring pain to right upper extremity.  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 We discussed no treatment, non-operative treatment, and operative treatment.    Aide presents today for initial consultation cervical radiculopathy likely referring pain to right upper extremity with stable prior right reverse total shoulder arthroplasty. I discussed with both her and her granddaughter at today's visit that her images from her right shoulder do demonstrate stable appearance of reverse total shoulder arthroplasty.  I discussed with both of them at today's visit that her shoulder does not demonstrate any signs of infection, however given her ongoing pain and discomfort we could proceed forward with further workup including lab work and CT scan. I have a low index of suspicion for infection.  Given that she has had significant improvement from cervical injections from Dr. Chaudhry, this would suggest a cervical etiology of her pain. I discussed we can order the lab work and CT scan at any point time if she wishes for further workup of her shoulder.  She can continue ice and over-the-counter medication as needed for pain relief.  She can follow-up with me on an as-needed basis.    Conservative treatment:    Ice to shoulder 1-2 times daily, for 20 minutes at a time.  PT for ROM and strengthening to shoulder, rotator cuff, scapular stabilizers.  Let pain guide return to activities.  Continue treatment with Dr. Garrett for cervical radiculopathy.  Can consider lab work if wishes to further workup for low-grade infection.      Imaging:    All imaging from today was reviewed by myself and explained to the patient.   Discussed can consider CT scan for further evaluation and loosening, however she wishes  to hold off at this point time.      Injection:    No Injection planned at this time.      Surgery:     No surgery is recommended at this point, continue with conservative treatment plan as noted.      Follow up:    Return if symptoms worsen or fail to improve.        Chief Complaint   Patient presents with    Right Shoulder - Pain       History of Present Illness:    The patient is a 89 y.o., right hand dominant female whose occupation is retired, referred to me by Dr. Mai, seen in clinic for consultation of right shoulder pain.  She presents in the office today with her granddaughter.  She states that she has been having ongoing right shoulder pain for several years without any specific injury or trauma.  She states that her pain is predominantly in the anterior aspect of her shoulder and radiates from her neck into her thumb.  She states that she has seen Dr. Garrett in the past for diagnostic injections which provided her significant improvement of her symptoms for 3+ months.  She denies any fever or chills.  She denies any redness or warmth along her incision.  She denies any fall or setback after her surgery.  She denies any clicking or catching sensation felt in her shoulder.  She states that her pain is after periods of rest and occasionally laying down.      Shoulder Surgical History:  Prior reverse total shoulder arthroplasty 10+ years ago by Dr. Beatty for irreparable rotator cuff tear    Past Medical, Social and Family History:  Past Medical History:   Diagnosis Date    Anxiety     Disease of thyroid gland      Past Surgical History:   Procedure Laterality Date    AUGMENTATION BREAST       SECTION      MASTECTOMY      SHOULDER SURGERY       Allergies   Allergen Reactions    Hylan G-F 20 Other (See Comments)    Influenza Virus Vaccine Other (See Comments)     Current Outpatient Medications on File Prior to Visit   Medication Sig Dispense Refill    levothyroxine 25 mcg tablet Take 25 mcg by mouth  "daily      Multiple Vitamins-Minerals (CENTRUM SILVER PO) Take by mouth      Turmeric (QC TUMERIC COMPLEX PO) Take by mouth (Patient not taking: Reported on 7/10/2024)       No current facility-administered medications on file prior to visit.     Social History     Socioeconomic History    Marital status:      Spouse name: Not on file    Number of children: Not on file    Years of education: Not on file    Highest education level: Not on file   Occupational History    Not on file   Tobacco Use    Smoking status: Never    Smokeless tobacco: Never   Vaping Use    Vaping status: Never Used   Substance and Sexual Activity    Alcohol use: Not Currently    Drug use: Never    Sexual activity: Not Currently   Other Topics Concern    Not on file   Social History Narrative    Not on file     Social Determinants of Health     Financial Resource Strain: Not on file   Food Insecurity: Not on file   Transportation Needs: Not on file   Physical Activity: Not on file   Stress: Not on file   Social Connections: Not on file   Intimate Partner Violence: Not on file   Housing Stability: Not on file         I have reviewed the past medical, surgical, social and family history, medications and allergies as documented in the EMR.    Review of systems: ROS is negative other than that noted in the HPI.  Constitutional: Negative for fatigue and fever.   HENT: Negative for sore throat.    Respiratory: Negative for shortness of breath.    Cardiovascular: Negative for chest pain.   Gastrointestinal: Negative for abdominal pain.   Endocrine: Negative for cold intolerance and heat intolerance.   Genitourinary: Negative for flank pain.   Musculoskeletal: Negative for back pain.   Skin: Negative for rash.   Allergic/Immunologic: Negative for immunocompromised state.   Neurological: Negative for dizziness.   Psychiatric/Behavioral: Negative for agitation.      Physical Exam:    Blood pressure 120/67, pulse 61, resp. rate 16, height 5' 4\" " (1.626 m), weight 49.4 kg (109 lb).    General/Constitutional: NAD, well developed, well nourished  HENT: Normocephalic, atraumatic  CV: Intact distal pulses, regular rate  Resp: No respiratory distress or labored breathing  Lymphatic: No lymphadenopathy palpated  Neuro: Alert and Oriented x 3, no focal deficits  Psych: Normal mood, normal affect, normal judgement, normal behavior  Skin: Warm, dry, no rashes, no erythema      Shoulder focused exam:     Visual inspection of the shoulder demonstrates normal contour without atrophy  No evidence of scapular dyskinesia or atrophy.  No scapular winging  Active and passive range of motion demonstrates forward flexion to 150, abduction to 90, external rotation is 60 with the arm the side, internal rotation to L1   4 out of 5 to resisted forward flexion, 4 out of 5 to abduction, 4 out of 5 to external rotation, 5 out of 5 to internal rotation  Incision appears healed with no drainage or erythema.  No warmth to touch.  No visualized redness  Biceps contour is symmetrical to contralateral side  No tenderness to palpation at the distal clavicle, AC joint, acromion, or scapular spine          UE NV Exam: +2 Radial pulses bilaterally  Sensation intact to light touch C5-T1 bilaterally, Radial/median/ulnar nerve motor intact      Bilateral elbow, wrist, and and forearm ROM full, painless with passive ROM, no ttp or crepitance throughout extremities below shoulder joint    Cervical ROM is full without pain, numbness or tingling      Shoulder Imaging    X-rays of the right shoulder were reviewed, which demonstrate a cemented acromion style reverse shoulder arthroplasty.  There is evidence of potential scapular notching along the inferomedial scapular pillar however I do not appreciate any obvious loosening of the glenosphere or baseplate.  There is no loosening of the humeral component and has a stable cement mantle.  I have reviewed the radiology report and agree with their  impression.    Ultrasound of right shoulder demonstrates postsurgical changes of reverse total shoulder arthroplasty with prior biceps tenodesis.  I have reviewed and agree with the radiologist interpretation.      Scribe Attestation      I,:  Tommy Ruby am acting as a scribe while in the presence of the attending physician.:       I,:  Markus Cannon, DO personally performed the services described in this documentation    as scribed in my presence.:

## 2024-08-12 ENCOUNTER — HOSPITAL ENCOUNTER (OUTPATIENT)
Dept: RADIOLOGY | Facility: CLINIC | Age: 89
Discharge: HOME | End: 2024-08-12
Payer: COMMERCIAL

## 2024-08-12 VITALS
SYSTOLIC BLOOD PRESSURE: 151 MMHG | DIASTOLIC BLOOD PRESSURE: 72 MMHG | RESPIRATION RATE: 18 BRPM | OXYGEN SATURATION: 98 % | TEMPERATURE: 97.3 F | HEART RATE: 65 BPM

## 2024-08-12 DIAGNOSIS — M47.812 CERVICAL SPONDYLOSIS: ICD-10-CM

## 2024-08-12 PROCEDURE — 64490 INJ PARAVERT F JNT C/T 1 LEV: CPT | Performed by: ANESTHESIOLOGY

## 2024-08-12 PROCEDURE — 64491 INJ PARAVERT F JNT C/T 2 LEV: CPT | Performed by: ANESTHESIOLOGY

## 2024-08-12 RX ORDER — BUPIVACAINE HCL/PF 2.5 MG/ML
3 VIAL (ML) INJECTION ONCE
Status: COMPLETED | OUTPATIENT
Start: 2024-08-12 | End: 2024-08-12

## 2024-08-12 RX ADMIN — BUPIVACAINE HYDROCHLORIDE 3 ML: 2.5 INJECTION, SOLUTION EPIDURAL; INFILTRATION; INTRACAUDAL at 14:16

## 2024-08-12 NOTE — DISCHARGE INSTRUCTIONS

## 2024-08-12 NOTE — H&P
History of Present Illness: The patient is a 89 y.o. female who presents with complaints of neck pain.    Past Medical History:   Diagnosis Date    Anxiety     Disease of thyroid gland        Past Surgical History:   Procedure Laterality Date    AUGMENTATION BREAST       SECTION      MASTECTOMY      SHOULDER SURGERY           Current Outpatient Medications:     levothyroxine 25 mcg tablet, Take 25 mcg by mouth daily, Disp: , Rfl:     Multiple Vitamins-Minerals (CENTRUM SILVER PO), Take by mouth, Disp: , Rfl:     Turmeric (QC TUMERIC COMPLEX PO), Take by mouth (Patient not taking: Reported on 7/10/2024), Disp: , Rfl:     Current Facility-Administered Medications:     bupivacaine (PF) (MARCAINE) 0.25 % injection 3 mL, 3 mL, Perineural, Once, Abel Garrett DO    Allergies   Allergen Reactions    Hylan G-F 20 Other (See Comments)    Influenza Virus Vaccine Other (See Comments)       Physical Exam:   General: Awake, Alert, Oriented x 3, Mood and affect appropriate  Respiratory: Respirations even and unlabored  Cardiovascular: Peripheral pulses intact; no edema  Musculoskeletal Exam: Pain with cervical extension    ASA Score: II         Assessment:   1. Cervical spondylosis        Plan: RT C4-6 MBB #2

## 2024-08-22 ENCOUNTER — TELEPHONE (OUTPATIENT)
Dept: PAIN MEDICINE | Facility: CLINIC | Age: 89
End: 2024-08-22

## 2024-08-22 NOTE — TELEPHONE ENCOUNTER
PRE OP INSTRUCTIONS:  -If you are on prescription blood thinners, you may have to hold the medication for several days before the procedure.    Please call the office to discuss medication holds at [phone].  -Do not eat or drink ONE HOUR prior to your procedure. If you are diabetic, may follow regular breakfast/lunch schedule and take usual    diabetic medications.  -Lumbar( low back) procedure, please wear comfortable slacks/pants.  -Cervical (neck) procedure, please wear a shirt/blouse that is easy to remove.  -A  is required to take you home form your procedure.  -Continue all to take prescribed medication the day of your procedure, including blood pressure medications.  -If you are prescribe antibiotics, have an active infection or have an open wound, please contact the office at [phone].  -Please refrain from any vaccinations two weeks before and two weeks after injection.  -Insurance authorization received in not a guarantee of payment per your insurance company's authorization disclaimer and it is    your responsibility to verify your benefits.          -If you have any questions about the instructions, please call me at [phone]          -PATIENT INSTRUCTED TO STOP ALL NSAID'S 4 DAYS PRIOR TO PROCEDURE            EXCEPT FOR IBUPROFEN IT CAN BE TAKEN UP TO 24 HOURS PRIOR TO PROCEDURE.

## 2024-08-22 NOTE — TELEPHONE ENCOUNTER
Caller: Grand Daughter     Doctor: Skip    Reason for call: patient submitted pain diary from last procedure she scheduled f/u 9/10 3pm is that f/u to discuss next step or can she schedule next procedure?    Call back#: [phone]

## 2024-09-06 ENCOUNTER — HOSPITAL ENCOUNTER (OUTPATIENT)
Dept: RADIOLOGY | Facility: HOSPITAL | Age: 89
End: 2024-09-06
Payer: COMMERCIAL

## 2024-09-06 DIAGNOSIS — M17.12 OSTEOARTHRITIS OF LEFT KNEE, UNSPECIFIED OSTEOARTHRITIS TYPE: ICD-10-CM

## 2024-09-06 PROCEDURE — 73562 X-RAY EXAM OF KNEE 3: CPT

## 2024-09-12 ENCOUNTER — OFFICE VISIT (OUTPATIENT)
Dept: OBGYN CLINIC | Facility: CLINIC | Age: 89
End: 2024-09-12
Payer: COMMERCIAL

## 2024-09-12 VITALS
WEIGHT: 113 LBS | BODY MASS INDEX: 19.29 KG/M2 | HEART RATE: 63 BPM | HEIGHT: 64 IN | SYSTOLIC BLOOD PRESSURE: 154 MMHG | DIASTOLIC BLOOD PRESSURE: 79 MMHG

## 2024-09-12 DIAGNOSIS — M17.12 PRIMARY OSTEOARTHRITIS OF LEFT KNEE: Primary | ICD-10-CM

## 2024-09-12 PROCEDURE — 99213 OFFICE O/P EST LOW 20 MIN: CPT | Performed by: STUDENT IN AN ORGANIZED HEALTH CARE EDUCATION/TRAINING PROGRAM

## 2024-09-12 PROCEDURE — 20610 DRAIN/INJ JOINT/BURSA W/O US: CPT | Performed by: STUDENT IN AN ORGANIZED HEALTH CARE EDUCATION/TRAINING PROGRAM

## 2024-09-12 RX ORDER — BUPIVACAINE HYDROCHLORIDE 2.5 MG/ML
4 INJECTION, SOLUTION INFILTRATION; PERINEURAL
Status: COMPLETED | OUTPATIENT
Start: 2024-09-12 | End: 2024-09-12

## 2024-09-12 RX ORDER — TRIAMCINOLONE ACETONIDE 40 MG/ML
40 INJECTION, SUSPENSION INTRA-ARTICULAR; INTRAMUSCULAR
Status: COMPLETED | OUTPATIENT
Start: 2024-09-12 | End: 2024-09-12

## 2024-09-12 RX ADMIN — TRIAMCINOLONE ACETONIDE 40 MG: 40 INJECTION, SUSPENSION INTRA-ARTICULAR; INTRAMUSCULAR at 09:45

## 2024-09-12 RX ADMIN — BUPIVACAINE HYDROCHLORIDE 4 ML: 2.5 INJECTION, SOLUTION INFILTRATION; PERINEURAL at 09:45

## 2024-09-12 NOTE — PROGRESS NOTES
"Ortho Sports Medicine Knee New Patient Visit     Assesment:   89 y.o. female left knee medial joint severe osteoarthritis    Plan:  The patient's diagnosis and treatment were discussed at length today. We discussed no treatment, non-operative treatment, and operative treatment.    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  Arthrocentesis was performed and corticosteroid injection provided.  Patient was educated regarding the pathophysiology of knee arthritis as well as treatment modalities at home.  She can follow-up with me in 3 months or as needed.    Large joint arthrocentesis: L knee  Universal Protocol:  procedure performed by consultantConsent: Verbal consent obtained.  Risks and benefits: risks, benefits and alternatives were discussed  Consent given by: patient  Time out: Immediately prior to procedure a \"time out\" was called to verify the correct patient, procedure, equipment, support staff and site/side marked as required.  Timeout called at: 9/12/2024 9:59 AM.  Patient understanding: patient states understanding of the procedure being performed  Patient consent: the patient's understanding of the procedure matches consent given  Site marked: the operative site was marked  Patient identity confirmed: verbally with patient  Supporting Documentation  Indications: pain, joint swelling and diagnostic evaluation   Procedure Details  Location: knee - L knee  Preparation: Patient was prepped and draped in the usual sterile fashion  Needle size: 18 G  Ultrasound guidance: no  Approach: anterolateral  Medications administered: 4 mL bupivacaine 0.25 %; 40 mg triamcinolone acetonide 40 mg/mL    Aspirate amount: 17 mL  Aspirate: blood-tinged  Patient tolerance: patient tolerated the procedure well with no immediate complications  Dressing:  Sterile dressing applied            Conservative treatment:    Ice to knee for 20 minutes at least 1-2 times daily.  OTC NSAIDS prn for " pain.    Imaging:    All imaging from today was reviewed by myself and explained to the patient.       Injection:    The risks and benefits of the injection (which include but are not limited to: infection, bleeding,damage to nerve/artery, need for further intervention), as well as the risks and benefits of all alternative treatments were explained and understood.  The patient elected to proceed with injection.  The procedure was done with aseptic technique, and the patient tolerated the procedure well with no complications.  A corticosteroid injection was performed.      Surgery:     No surgery is recommended at this point, continue with conservative treatment plan as noted.      Follow up:    No follow-ups on file.        Chief Complaint   Patient presents with    Left Knee - Pain       History of Present Illness:    The patient is a 89 y.o. female, referred to me by their primary care physician, seen in clinic for consultation of left knee pain. The patient states she has experienced mild left knee pain for a long time, but the pain has recently flared up last week. She denies any injury. She has been experiencing anterior medial joint pain rated a 5/10 when walking and climbing stairs. She denies pain while sitting or laying down.  No history of corticosteroid injections in the left knee. She had a reaction of severe joint swelling with ortho-visc injections previously. The patient has tried rest and ice with mild relief.       Knee Surgical History:  None    Past Medical, Social and Family History:  Past Medical History:   Diagnosis Date    Anxiety     Disease of thyroid gland      Past Surgical History:   Procedure Laterality Date    AUGMENTATION BREAST       SECTION      MASTECTOMY      SHOULDER SURGERY       Allergies   Allergen Reactions    Hylan G-F 20 Other (See Comments)    Influenza Virus Vaccine Other (See Comments)     Current Outpatient Medications on File Prior to Visit   Medication Sig  Dispense Refill    levothyroxine 25 mcg tablet Take 25 mcg by mouth daily      Multiple Vitamins-Minerals (CENTRUM SILVER PO) Take by mouth      Turmeric (QC TUMERIC COMPLEX PO) Take by mouth (Patient not taking: Reported on 7/10/2024)       No current facility-administered medications on file prior to visit.     Social History     Socioeconomic History    Marital status:      Spouse name: Not on file    Number of children: Not on file    Years of education: Not on file    Highest education level: Not on file   Occupational History    Not on file   Tobacco Use    Smoking status: Never    Smokeless tobacco: Never   Vaping Use    Vaping status: Never Used   Substance and Sexual Activity    Alcohol use: Not Currently    Drug use: Never    Sexual activity: Not Currently   Other Topics Concern    Not on file   Social History Narrative    Not on file     Social Determinants of Health     Financial Resource Strain: Not on file   Food Insecurity: Not on file   Transportation Needs: Not on file   Physical Activity: Not on file   Stress: Not on file   Social Connections: Not on file   Intimate Partner Violence: Not on file   Housing Stability: Not on file         I have reviewed the past medical, surgical, social and family history, medications and allergies as documented in the EMR.    Review of systems: ROS is negative other than that noted in the HPI.  Constitutional: Negative for fatigue and fever.   HENT: Negative for sore throat.    Respiratory: Negative for shortness of breath.    Cardiovascular: Negative for chest pain.   Gastrointestinal: Negative for abdominal pain.   Endocrine: Negative for cold intolerance and heat intolerance.   Genitourinary: Negative for flank pain.   Musculoskeletal: Negative for back pain.   Skin: Negative for rash.   Allergic/Immunologic: Negative for immunocompromised state.   Neurological: Negative for dizziness.   Psychiatric/Behavioral: Negative for agitation.      Physical  "Exam:    Blood pressure 154/79, pulse 63, height 5' 4\" (1.626 m), weight 51.3 kg (113 lb).    General/Constitutional: NAD, well developed, well nourished  HENT: Normocephalic, atraumatic  CV: Intact distal pulses, regular rate  Resp: No respiratory distress or labored breathing  Lymphatic: No lymphadenopathy palpated  Neuro: Alert and Oriented x 3, no focal deficits  Psych: Normal mood, normal affect, normal judgement, normal behavior  Skin: Warm, dry, no rashes, no erythema      Knee Exam (focused):  Visual inspection of the left knee demonstrates normal contour without atrophy.   No previous incisions   There is no significant erythema or edema.    Mild joint effusion   Range of motion is full from 0-130 degrees of flexion   Medial joint line is tender to palpation  - lateral joint line tenderness  + medial Nery's, - lateral Nery's  1A Lachman exam, - posterior drawer    Stable to varus and valgus stress at both 0 and 30°  Patella tracks normally.  No J sign.  No apprehension.  Translation is approximately 2 quadrants and is equal to the contralateral side  Patellar eversion is similar to the contralateral side    Examination of the patient's ipsilateral hip demonstrates full painless range of motion.  No crepitus.      LE NV Exam: +2 DP/PT pulses bilaterally  Sensation intact to light touch L2-S1 bilaterally     Bilateral hip ROM demonstrates no pain actively or passively    No calf tenderness to palpation bilaterally    Knee Imaging    X-rays of the left knee were reviewed, which demonstrate sever osteoarthritis.  I have reviewed the radiology report and agree with their impression.        Scribe Attestation      I,:  Stephen Lux am acting as a scribe while in the presence of the attending physician.:       I,:  Markus Cannon DO personally performed the services described in this documentation    as scribed in my presence.:             "

## 2024-09-19 ENCOUNTER — TELEPHONE (OUTPATIENT)
Dept: PAIN MEDICINE | Facility: CLINIC | Age: 89
End: 2024-09-19

## 2024-09-19 ENCOUNTER — HOSPITAL ENCOUNTER (OUTPATIENT)
Dept: RADIOLOGY | Facility: CLINIC | Age: 89
Discharge: HOME | End: 2024-09-19
Payer: COMMERCIAL

## 2024-09-19 VITALS
OXYGEN SATURATION: 99 % | DIASTOLIC BLOOD PRESSURE: 65 MMHG | SYSTOLIC BLOOD PRESSURE: 137 MMHG | TEMPERATURE: 98.9 F | RESPIRATION RATE: 18 BRPM | HEART RATE: 64 BPM

## 2024-09-19 DIAGNOSIS — M47.812 CERVICAL SPONDYLOSIS: ICD-10-CM

## 2024-09-19 PROCEDURE — 64634 DESTROY C/TH FACET JNT ADDL: CPT | Performed by: ANESTHESIOLOGY

## 2024-09-19 PROCEDURE — 64633 DESTROY CERV/THOR FACET JNT: CPT | Performed by: ANESTHESIOLOGY

## 2024-09-19 RX ADMIN — LIDOCAINE HYDROCHLORIDE 3 ML: 20 INJECTION, SOLUTION EPIDURAL; INFILTRATION; INTRACAUDAL at 11:27

## 2024-09-19 NOTE — TELEPHONE ENCOUNTER
Please call 9/20/24    F/u 10/17/24 with 1:15pm arrival with [facility]    Please call on pt's cell ending [phone]

## 2024-09-19 NOTE — DISCHARGE INSTRUCTIONS

## 2024-09-19 NOTE — H&P
History of Present Illness: The patient is a 89 y.o. female who presents with complaints of neck pain.      Past Medical History:   Diagnosis Date    Anxiety     Disease of thyroid gland        Past Surgical History:   Procedure Laterality Date    AUGMENTATION BREAST       SECTION      MASTECTOMY      SHOULDER SURGERY           Current Outpatient Medications:     levothyroxine 25 mcg tablet, Take 25 mcg by mouth daily, Disp: , Rfl:     Multiple Vitamins-Minerals (CENTRUM SILVER PO), Take by mouth, Disp: , Rfl:     Turmeric (QC TUMERIC COMPLEX PO), Take by mouth (Patient not taking: Reported on 7/10/2024), Disp: , Rfl:     Current Facility-Administered Medications:     lidocaine (PF) (XYLOCAINE-MPF) 2 % injection 3 mL, 3 mL, Perineural, Once, Abel Garrett DO    Allergies   Allergen Reactions    Hylan G-F 20 Other (See Comments)    Influenza Virus Vaccine Other (See Comments)       Physical Exam:   Vitals:    24 1112   BP: 135/80   Pulse: 78   Resp: 18   Temp: 98.9 °F (37.2 °C)   SpO2: 98%     General: Awake, Alert, Oriented x 3, Mood and affect appropriate  Respiratory: Respirations even and unlabored  Cardiovascular: Peripheral pulses intact; no edema  Musculoskeletal Exam: Pain with cervical extension    ASA Score: II    Patient/Chart Verification  Patient ID Verified: Verbal  ID Band Applied: No  Consents Confirmed: Procedural, To be obtained in the Pre-Procedure area  Interval H&P(within 24 hr) Complete (required for Outpatients and Surgery Admit only): To be obtained in the Procedural area  Allergies Reviewed: Yes  Anticoag/NSAID held?: NA  Currently on antibiotics?: No    Assessment:   1. Cervical spondylosis        Plan: right C4,5,6 [facility] [phone]

## 2024-09-20 NOTE — TELEPHONE ENCOUNTER
Pt did well over night no s/s of infection or sunburn sensation.  Aware it takes 2 weeks to notice pain relief and 4-6 weeks for full pain effect to be achieved.  Aware to medicate as previous for discomfort and may use ice or heat.  Confirmed next appt. 10/17/24 1:15pm arrival  Call if any questions or concerns prior to next appt.   Current pain level 0    Pt was dizzy and lightheaded last night but that has since resolved.

## 2024-10-17 ENCOUNTER — OFFICE VISIT (OUTPATIENT)
Dept: PAIN MEDICINE | Facility: CLINIC | Age: 89
End: 2024-10-17
Payer: COMMERCIAL

## 2024-10-17 VITALS
SYSTOLIC BLOOD PRESSURE: 124 MMHG | TEMPERATURE: 97.6 F | WEIGHT: 112 LBS | BODY MASS INDEX: 19.12 KG/M2 | HEIGHT: 64 IN | DIASTOLIC BLOOD PRESSURE: 78 MMHG

## 2024-10-17 DIAGNOSIS — M54.2 NECK PAIN: ICD-10-CM

## 2024-10-17 DIAGNOSIS — M47.812 CERVICAL SPONDYLOSIS: Primary | ICD-10-CM

## 2024-10-17 PROCEDURE — G2211 COMPLEX E/M VISIT ADD ON: HCPCS | Performed by: PHYSICIAN ASSISTANT

## 2024-10-17 PROCEDURE — 99213 OFFICE O/P EST LOW 20 MIN: CPT | Performed by: PHYSICIAN ASSISTANT

## 2024-10-17 NOTE — PROGRESS NOTES
Assessment:  1. Cervical spondylosis    2. Neck pain        Plan:  While the patient was in the office today, I did have a thorough conversation regarding their chronic pain syndrome, medication management, and treatment plan options.    The patient is status post right C4-6 radiofrequency ablation and is able to report 25% reduction in pain.  Patient was aware that it may take a few more weeks for the procedure to take full effect.  Overall however, she is pleased with results that she has been able to report so far.  I made the patient aware that the procedure can be repeated in 1 year if indicated.    She continues to describe right biceps pain and is considering following up with Dr. Cannon.    The patient was advised to contact the office should their symptoms worsen in the interim. The patient was agreeable and verbalized an understanding.        History of Present Illness:    The patient is a 89 y.o. female last seen on 9/19/2024 who presents for a follow up office visit in regards to chronic neck pain secondary to cervical spondylosis.   The patient currently reports right-sided neck pain that she rates a 5 out of 10 and describes it as an occasional dull, aching and sharp pain.  On 9/19/2024 the patient underwent a right C4-6 radiofrequency ablation and she is able to report 25% reduction of pain.  Patient is aware that it may take more time for the procedure to take full effect.  Overall, however she is quite pleased with the results of the procedure.  She is however continuing to complain of discomfort in the right biceps region.  Patient finds relief with topical pain creams.    I have personally reviewed and/or updated the patient's past medical history, past surgical history, family history, social history, current medications, allergies, and vital signs today.       Review of Systems:    Review of Systems   Respiratory:  Negative for shortness of breath.    Cardiovascular:  Negative for chest pain.  "  Gastrointestinal:  Negative for constipation, diarrhea, nausea and vomiting.   Musculoskeletal:  Negative for arthralgias, gait problem, joint swelling and myalgias.   Skin:  Negative for rash.   Neurological:  Negative for dizziness, seizures and weakness.   All other systems reviewed and are negative.        Past Medical History:   Diagnosis Date    Anxiety     Disease of thyroid gland        Past Surgical History:   Procedure Laterality Date    AUGMENTATION BREAST       SECTION      MASTECTOMY      SHOULDER SURGERY         Family History   Problem Relation Age of Onset    Breast cancer Mother         after menopuse  b/l mastectomy  pt states  3 sisters    Breast cancer Daughter     Ovarian cancer Neg Hx        Social History     Occupational History    Not on file   Tobacco Use    Smoking status: Never    Smokeless tobacco: Never   Vaping Use    Vaping status: Never Used   Substance and Sexual Activity    Alcohol use: Not Currently    Drug use: Never    Sexual activity: Not Currently         Current Outpatient Medications:     levothyroxine 25 mcg tablet, Take 25 mcg by mouth daily, Disp: , Rfl:     Multiple Vitamins-Minerals (CENTRUM SILVER PO), Take by mouth, Disp: , Rfl:     Turmeric (QC TUMERIC COMPLEX PO), Take by mouth (Patient not taking: Reported on 7/10/2024), Disp: , Rfl:     Allergies   Allergen Reactions    Hylan G-F 20 Other (See Comments)    Influenza Virus Vaccine Other (See Comments)       Physical Exam:    /78 (BP Location: Left arm, Patient Position: Sitting, Cuff Size: Standard)   Temp 97.6 °F (36.4 °C)   Ht 5' 4\" (1.626 m)   Wt 50.8 kg (112 lb)   BMI 19.22 kg/m²     Constitutional:normal, well developed, well nourished, alert, in no distress and non-toxic and no overt pain behavior.  Eyes:anicteric  HEENT:grossly intact  Neck:supple, symmetric, trachea midline and no masses   Pulmonary:even and unlabored  Cardiovascular:No edema or pitting edema present  Skin:Normal without " rashes or lesions and well hydrated  Psychiatric:Mood and affect appropriate  Neurologic:Cranial Nerves II-XII grossly intact  Musculoskeletal:normal      Imaging  No orders to display         No orders of the defined types were placed in this encounter.

## 2024-11-29 ENCOUNTER — APPOINTMENT (EMERGENCY)
Dept: RADIOLOGY | Facility: HOSPITAL | Age: 89
End: 2024-11-29
Payer: COMMERCIAL

## 2024-11-29 ENCOUNTER — HOSPITAL ENCOUNTER (EMERGENCY)
Facility: HOSPITAL | Age: 89
Discharge: HOME | End: 2024-11-29
Attending: EMERGENCY MEDICINE
Payer: COMMERCIAL

## 2024-11-29 VITALS
TEMPERATURE: 97.8 F | SYSTOLIC BLOOD PRESSURE: 155 MMHG | HEART RATE: 63 BPM | OXYGEN SATURATION: 100 % | RESPIRATION RATE: 18 BRPM | DIASTOLIC BLOOD PRESSURE: 86 MMHG

## 2024-11-29 DIAGNOSIS — R07.9 CHEST PAIN: Primary | ICD-10-CM

## 2024-11-29 DIAGNOSIS — R06.00 DYSPNEA: ICD-10-CM

## 2024-11-29 LAB
2HR DELTA HS TROPONIN: 1 NG/L
ALBUMIN SERPL BCG-MCNC: 3.9 G/DL (ref 3.5–5)
ALP SERPL-CCNC: 43 U/L (ref 34–104)
ALT SERPL W P-5'-P-CCNC: 16 U/L (ref 7–52)
ANION GAP SERPL CALCULATED.3IONS-SCNC: 6 MMOL/L (ref 4–13)
AST SERPL W P-5'-P-CCNC: 28 U/L (ref 13–39)
ATRIAL RATE: 64 BPM
BACTERIA UR QL AUTO: NORMAL /HPF
BASOPHILS # BLD AUTO: 0.04 THOUSANDS/ΜL (ref 0–0.1)
BASOPHILS NFR BLD AUTO: 1 % (ref 0–1)
BILIRUB SERPL-MCNC: 0.74 MG/DL (ref 0.2–1)
BILIRUB UR QL STRIP: NEGATIVE
BNP SERPL-MCNC: 125 PG/ML (ref 0–100)
BUN SERPL-MCNC: 29 MG/DL (ref 5–25)
CALCIUM SERPL-MCNC: 9 MG/DL (ref 8.4–10.2)
CARDIAC TROPONIN I PNL SERPL HS: 5 NG/L (ref ?–50)
CARDIAC TROPONIN I PNL SERPL HS: 6 NG/L (ref ?–50)
CHLORIDE SERPL-SCNC: 105 MMOL/L (ref 96–108)
CLARITY UR: CLEAR
CO2 SERPL-SCNC: 26 MMOL/L (ref 21–32)
COLOR UR: ABNORMAL
CREAT SERPL-MCNC: 0.86 MG/DL (ref 0.6–1.3)
EOSINOPHIL # BLD AUTO: 0.05 THOUSAND/ΜL (ref 0–0.61)
EOSINOPHIL NFR BLD AUTO: 1 % (ref 0–6)
ERYTHROCYTE [DISTWIDTH] IN BLOOD BY AUTOMATED COUNT: 13.3 % (ref 11.6–15.1)
GFR SERPL CREATININE-BSD FRML MDRD: 59 ML/MIN/1.73SQ M
GLUCOSE SERPL-MCNC: 83 MG/DL (ref 65–140)
GLUCOSE UR STRIP-MCNC: NEGATIVE MG/DL
HCT VFR BLD AUTO: 39.6 % (ref 34.8–46.1)
HGB BLD-MCNC: 12.7 G/DL (ref 11.5–15.4)
HGB UR QL STRIP.AUTO: ABNORMAL
IMM GRANULOCYTES # BLD AUTO: 0.03 THOUSAND/UL (ref 0–0.2)
IMM GRANULOCYTES NFR BLD AUTO: 0 % (ref 0–2)
KETONES UR STRIP-MCNC: NEGATIVE MG/DL
LACTATE SERPL-SCNC: 0.9 MMOL/L (ref 0.5–2)
LEUKOCYTE ESTERASE UR QL STRIP: NEGATIVE
LIPASE SERPL-CCNC: 17 U/L (ref 11–82)
LYMPHOCYTES # BLD AUTO: 1.45 THOUSANDS/ΜL (ref 0.6–4.47)
LYMPHOCYTES NFR BLD AUTO: 21 % (ref 14–44)
MAGNESIUM SERPL-MCNC: 1.9 MG/DL (ref 1.9–2.7)
MCH RBC QN AUTO: 30.8 PG (ref 26.8–34.3)
MCHC RBC AUTO-ENTMCNC: 32.1 G/DL (ref 31.4–37.4)
MCV RBC AUTO: 96 FL (ref 82–98)
MONOCYTES # BLD AUTO: 0.67 THOUSAND/ΜL (ref 0.17–1.22)
MONOCYTES NFR BLD AUTO: 10 % (ref 4–12)
NEUTROPHILS # BLD AUTO: 4.77 THOUSANDS/ΜL (ref 1.85–7.62)
NEUTS SEG NFR BLD AUTO: 67 % (ref 43–75)
NITRITE UR QL STRIP: NEGATIVE
NON-SQ EPI CELLS URNS QL MICRO: NORMAL /HPF
NRBC BLD AUTO-RTO: 0 /100 WBCS
P AXIS: 85 DEGREES
PH UR STRIP.AUTO: 6.5 [PH]
PLATELET # BLD AUTO: 184 THOUSANDS/UL (ref 149–390)
PMV BLD AUTO: 8.1 FL (ref 8.9–12.7)
POTASSIUM SERPL-SCNC: 4 MMOL/L (ref 3.5–5.3)
PR INTERVAL: 248 MS
PROT SERPL-MCNC: 6.6 G/DL (ref 6.4–8.4)
PROT UR STRIP-MCNC: NEGATIVE MG/DL
QRS AXIS: 77 DEGREES
QRSD INTERVAL: 122 MS
QT INTERVAL: 420 MS
QTC INTERVAL: 433 MS
RBC # BLD AUTO: 4.12 MILLION/UL (ref 3.81–5.12)
RBC #/AREA URNS AUTO: NORMAL /HPF
SODIUM SERPL-SCNC: 137 MMOL/L (ref 135–147)
SP GR UR STRIP.AUTO: 1.01 (ref 1–1.03)
T WAVE AXIS: 42 DEGREES
UROBILINOGEN UR STRIP-ACNC: <2 MG/DL
VENTRICULAR RATE: 64 BPM
WBC # BLD AUTO: 7.01 THOUSAND/UL (ref 4.31–10.16)
WBC #/AREA URNS AUTO: NORMAL /HPF

## 2024-11-29 PROCEDURE — 99285 EMERGENCY DEPT VISIT HI MDM: CPT | Performed by: EMERGENCY MEDICINE

## 2024-11-29 PROCEDURE — 83880 ASSAY OF NATRIURETIC PEPTIDE: CPT | Performed by: EMERGENCY MEDICINE

## 2024-11-29 PROCEDURE — 83605 ASSAY OF LACTIC ACID: CPT | Performed by: EMERGENCY MEDICINE

## 2024-11-29 PROCEDURE — 99285 EMERGENCY DEPT VISIT HI MDM: CPT

## 2024-11-29 PROCEDURE — 93005 ELECTROCARDIOGRAM TRACING: CPT

## 2024-11-29 PROCEDURE — 81001 URINALYSIS AUTO W/SCOPE: CPT | Performed by: EMERGENCY MEDICINE

## 2024-11-29 PROCEDURE — 80053 COMPREHEN METABOLIC PANEL: CPT | Performed by: EMERGENCY MEDICINE

## 2024-11-29 PROCEDURE — 36415 COLL VENOUS BLD VENIPUNCTURE: CPT | Performed by: EMERGENCY MEDICINE

## 2024-11-29 PROCEDURE — 96361 HYDRATE IV INFUSION ADD-ON: CPT

## 2024-11-29 PROCEDURE — 93010 ELECTROCARDIOGRAM REPORT: CPT | Performed by: INTERNAL MEDICINE

## 2024-11-29 PROCEDURE — 84484 ASSAY OF TROPONIN QUANT: CPT | Performed by: EMERGENCY MEDICINE

## 2024-11-29 PROCEDURE — 83735 ASSAY OF MAGNESIUM: CPT | Performed by: EMERGENCY MEDICINE

## 2024-11-29 PROCEDURE — 85025 COMPLETE CBC W/AUTO DIFF WBC: CPT | Performed by: EMERGENCY MEDICINE

## 2024-11-29 PROCEDURE — 83690 ASSAY OF LIPASE: CPT | Performed by: EMERGENCY MEDICINE

## 2024-11-29 PROCEDURE — 71045 X-RAY EXAM CHEST 1 VIEW: CPT

## 2024-11-29 PROCEDURE — 96360 HYDRATION IV INFUSION INIT: CPT

## 2024-11-29 RX ADMIN — SODIUM CHLORIDE 500 ML: 0.9 INJECTION, SOLUTION INTRAVENOUS at 11:25

## 2024-11-29 NOTE — ED PROVIDER NOTES
ED Disposition       None          Assessment & Plan       Medical Decision Making     Reviewed past medical records: Yes previous hospital admissions including echo results reviewed     History Provided by: Patient, family     Differential considered: Arrhythmia, ACS, pneumothorax, pneumonia, anxiety     Consideration of tests: Patient is workup is grossly negative, delta troponins were stable, despite patient's age her lack of risk factors makes her have a heart score of 4.  Had lengthy discussion about admission to hospital for further testing versus outpatient.  Patient's preference and agreeable with family is to have outpatient testing with PCP and follow-up with outpatient cardiology.  Patient was able to ambulate in the emergency room without symptoms.  Stable for discharge.  Strict return precautions discussed       I have reviewed the patient's visit and any testing done in the emergency department.  They have verbalized their understanding of any testing done today and have no further questions or concerns regarding their care in the emergency room.  They will follow up with their primary care physician as well as with any specialist in their discharge instructions.  Strict return precautions were discussed.    Amount and/or Complexity of Data Reviewed  Labs: ordered. Decision-making details documented in ED Course.  Radiology: ordered.        ED Course as of 11/29/24 1833 Fri Nov 29, 2024   1410 Procedure Note: EKG  Date/Time: 11/29/24 2:10 PM   Performed by: Angelo Santos  Authorized by: Angelo Santos  ECG interpreted by me, the ED Provider: yes   The EKG demonstrates:  Rate 64  Rhythm sinus  QTc 433  No ST elevations/depressions       1436 hs TnI 2hr: 6   1436 Delta 2hr hsTnI: 1       Medications   sodium chloride 0.9 % bolus 500 mL (has no administration in time range)       ED Risk Strat Scores                           SBIRT 22yo+      Flowsheet Row Most Recent Value   Initial Alcohol Screen: US  "AUDIT-C     1. How often do you have a drink containing alcohol? 1 Filed at: 2024 1117   2. How many drinks containing alcohol do you have on a typical day you are drinking?  0 Filed at: 2024   3b. FEMALE Any Age, or MALE 65+: How often do you have 4 or more drinks on one occassion? 0 Filed at: 2024   Audit-C Score 1 Filed at: 2024   JIE: How many times in the past year have you...    Used an illegal drug or used a prescription medication for non-medical reasons? Never Filed at: 2024                            History of Present Illness       Chief Complaint   Patient presents with    Shortness of Breath     Pt states around 930am \"I had a spell where I had pain in my back my arms and my chest this morning\" Pt reports pain has subsided. Denies any dizziness.        Past Medical History:   Diagnosis Date    Anxiety     Disease of thyroid gland       Past Surgical History:   Procedure Laterality Date    AUGMENTATION BREAST       SECTION      MASTECTOMY      SHOULDER SURGERY        Family History   Problem Relation Age of Onset    Breast cancer Mother         after menopuse  b/l mastectomy  pt states  3 sisters    Breast cancer Daughter     Ovarian cancer Neg Hx       Social History     Tobacco Use    Smoking status: Never    Smokeless tobacco: Never   Vaping Use    Vaping status: Never Used   Substance Use Topics    Alcohol use: Not Currently    Drug use: Never      E-Cigarette/Vaping    E-Cigarette Use Never User       E-Cigarette/Vaping Substances    Nicotine No     THC No     CBD No     Flavoring No     Other No     Unknown No       I have reviewed and agree with the history as documented.     Patient presents with:  Shortness of Breath: Pt states around 930am \"I had a spell where I had pain in my back my arms and my chest this morning\" Pt reports pain has subsided. Denies any dizziness.     89 year old female, vague symptoms of chest discomfort and strange " feelings in chest and arms, associated with sob but not dyspnea. No similar previous symptoms. States she is able to walk about and down 12 steps at baseline before gettign sob. No assocaited nausea, diaphoresis or vomiting.         Review of Systems   Constitutional: Negative.  Negative for chills and fever.   HENT: Negative.  Negative for rhinorrhea, sore throat, trouble swallowing and voice change.    Eyes: Negative.  Negative for pain and visual disturbance.   Respiratory:  Positive for shortness of breath. Negative for cough and wheezing.    Cardiovascular:  Positive for chest pain. Negative for palpitations.   Gastrointestinal:  Negative for abdominal pain, diarrhea, nausea and vomiting.   Genitourinary: Negative.  Negative for dysuria and frequency.   Musculoskeletal: Negative.  Negative for neck pain and neck stiffness.   Skin: Negative.  Negative for rash.   Neurological: Negative.  Negative for dizziness, speech difficulty, weakness, light-headedness and numbness.           Objective       ED Triage Vitals   Temperature Pulse Blood Pressure Respirations SpO2 Patient Position - Orthostatic VS   11/29/24 1057 11/29/24 1056 11/29/24 1056 11/29/24 1056 11/29/24 1056 11/29/24 1056   97.8 °F (36.6 °C) 75 128/63 20 99 % Sitting      Temp src Heart Rate Source BP Location FiO2 (%) Pain Score    -- 11/29/24 1056 11/29/24 1056 -- 11/29/24 1056     Monitor Left arm  No Pain      Vitals      Date and Time Temp Pulse SpO2 Resp BP Pain Score FACES Pain Rating User   11/29/24 1106 -- -- -- -- -- No Pain -- MG   11/29/24 1057 97.8 °F (36.6 °C) -- -- -- -- -- --    11/29/24 1056 -- 75 99 % 20 128/63 No Pain --             Physical Exam  Vitals and nursing note reviewed.   Constitutional:       General: She is not in acute distress.     Appearance: She is well-developed. She is not ill-appearing, toxic-appearing or diaphoretic.   HENT:      Head: Normocephalic and atraumatic.   Eyes:      Conjunctiva/sclera:  Conjunctivae normal.      Pupils: Pupils are equal, round, and reactive to light.   Neck:      Trachea: No tracheal deviation.   Cardiovascular:      Rate and Rhythm: Normal rate and regular rhythm.   Pulmonary:      Effort: Pulmonary effort is normal. No tachypnea, accessory muscle usage, respiratory distress or retractions.      Breath sounds: Normal breath sounds. No decreased breath sounds, wheezing, rhonchi or rales.   Abdominal:      General: Bowel sounds are normal. There is no distension.      Palpations: Abdomen is soft.      Tenderness: There is no abdominal tenderness. There is no guarding or rebound.   Musculoskeletal:         General: No tenderness or deformity. Normal range of motion.      Cervical back: Normal range of motion and neck supple.   Skin:     General: Skin is warm and dry.      Capillary Refill: Capillary refill takes less than 2 seconds.      Findings: No rash.   Neurological:      Mental Status: She is alert and oriented to person, place, and time.   Psychiatric:         Behavior: Behavior normal.         Results Reviewed       Procedure Component Value Units Date/Time    CBC and differential [[id number]]     Lab Status: No result Specimen: Blood     Comprehensive metabolic panel [[id number]]     Lab Status: No result Specimen: Blood     Magnesium [[id number]]     Lab Status: No result Specimen: Blood     Lipase [[id number]]     Lab Status: No result Specimen: Blood     HS Troponin 0hr (reflex protocol) [[id number]]     Lab Status: No result Specimen: Blood     B-Type Natriuretic Peptide(BNP) [[id number]]     Lab Status: No result Specimen: Blood     UA (URINE) with reflex to Scope [[id number]]     Lab Status: No result Specimen: Urine     Lactic acid, plasma (w/reflex if result > 2.0) [[id number]]     Lab Status: No result Specimen: Blood             XR chest 1 view portable    (Results Pending)       Procedures    ED Medication and Procedure Management   Prior to Admission Medications    Prescriptions Last Dose Informant Patient Reported? Taking?   Multiple Vitamins-Minerals (CENTRUM SILVER PO)  Self Yes No   Sig: Take by mouth   Turmeric (QC TUMERIC COMPLEX PO)  Self Yes No   Sig: Take by mouth   Patient not taking: Reported on 7/10/2024   levothyroxine 25 mcg tablet  Self Yes No   Sig: Take 25 mcg by mouth daily      Facility-Administered Medications: None     Patient's Medications   Discharge Prescriptions    No medications on file     No discharge procedures on file.  ED SEPSIS DOCUMENTATION            Aguila Santos DO  11/29/24 1990

## 2024-12-16 ENCOUNTER — OFFICE VISIT (OUTPATIENT)
Dept: CARDIOLOGY CLINIC | Facility: CLINIC | Age: 89
End: 2024-12-16
Payer: COMMERCIAL

## 2024-12-16 VITALS
SYSTOLIC BLOOD PRESSURE: 118 MMHG | BODY MASS INDEX: 19.29 KG/M2 | HEART RATE: 79 BPM | HEIGHT: 64 IN | WEIGHT: 113 LBS | DIASTOLIC BLOOD PRESSURE: 82 MMHG

## 2024-12-16 DIAGNOSIS — R07.9 CHEST PAIN: ICD-10-CM

## 2024-12-16 PROCEDURE — 99214 OFFICE O/P EST MOD 30 MIN: CPT | Performed by: PHYSICIAN ASSISTANT

## 2024-12-16 NOTE — PROGRESS NOTES
Cardiology Office Follow Up  Aide Yanes  11/5/1934  [id number]      ASSESSMENT:  Dyspnea on exertion  Orthostatic hypotension  Hypothyroidism    PLAN:  Obtain routine TTE to assess LVEF, valvular and/or regional wall motion abnormalities.   Hold off on adding diuretics at this point as she appears grossly euvolemic on exam today. Can trial Lasix 10 mg on a as needed basis if with elevated filling pressures on echo.  Otherwise without syncope or presyncopal episodes since her last outpatient visit.  She feels much better wearing her knee-high leggings and she changes positions slowly.  Has not had any issues.    She is on a high-protein diet and drinks Ensure shakes daily  Can follow-up on a 6-month basis or as needed.  Please call with any questions or concerns in the meantime.    Interval History/ HPI:   Aide Yanes is a very pleasant 89 year old female with no significant past cardiac history who presents for routine follow-up visit.  She was seen here for hospital follow-up for syncopal episode in the setting of orthostatic hypotension.  Reports that she does have occasional dyspnea on exertion with moderate activity.  She is otherwise independent of her ADLs.  She denies signs and symptoms of volume overload including weight gain lower extremity edema, orthopnea or PND symptoms.  She denies overt chest pain or discomfort.  She presents with her daughter who cares for her.      Vitals:  118/82  79  113lbs    Past Medical History:   Diagnosis Date    Anxiety     Disease of thyroid gland      Social History     Socioeconomic History    Marital status:      Spouse name: Not on file    Number of children: Not on file    Years of education: Not on file    Highest education level: Not on file   Occupational History    Not on file   Tobacco Use    Smoking status: Never    Smokeless tobacco: Never   Vaping Use    Vaping status: Never Used   Substance and Sexual Activity    Alcohol use: Not Currently    Drug  use: Never    Sexual activity: Not Currently   Other Topics Concern    Not on file   Social History Narrative    Not on file     Social Drivers of Health     Financial Resource Strain: Not on file   Food Insecurity: Not on file   Transportation Needs: Not on file   Physical Activity: Not on file   Stress: Not on file   Social Connections: Not on file   Intimate Partner Violence: Not on file   Housing Stability: Not on file      Family History   Problem Relation Age of Onset    Breast cancer Mother         after menopuse  b/l mastectomy  pt states  3 sisters    Breast cancer Daughter     Ovarian cancer Neg Hx      Past Surgical History:   Procedure Laterality Date    AUGMENTATION BREAST       SECTION      MASTECTOMY      SHOULDER SURGERY         Current Outpatient Medications:     levothyroxine 25 mcg tablet, Take 25 mcg by mouth daily, Disp: , Rfl:     Multiple Vitamins-Minerals (CENTRUM SILVER PO), Take by mouth, Disp: , Rfl:     Turmeric (QC TUMERIC COMPLEX PO), Take by mouth (Patient not taking: Reported on 7/10/2024), Disp: , Rfl:     Review of Systems:  Review of Systems   Constitutional:  Negative for appetite change, chills, diaphoresis, fatigue and fever.   Respiratory:  Negative for cough, chest tightness and shortness of breath.    Cardiovascular:  Negative for chest pain, palpitations and leg swelling.   Gastrointestinal:  Negative for diarrhea, nausea and vomiting.   Endocrine: Negative for cold intolerance and heat intolerance.   Genitourinary:  Negative for difficulty urinating, dysuria and enuresis.   Musculoskeletal:  Negative for arthralgias, back pain and gait problem.   Allergic/Immunologic: Negative for environmental allergies and food allergies.   Neurological:  Negative for dizziness, facial asymmetry and headaches.   Hematological:  Negative for adenopathy. Does not bruise/bleed easily.   Psychiatric/Behavioral:  Negative for agitation, behavioral problems and confusion.      Physical  Exam:  Physical Exam  Constitutional:       Appearance: She is well-developed.   HENT:      Right Ear: External ear normal.      Left Ear: External ear normal.   Eyes:      Pupils: Pupils are equal, round, and reactive to light.   Cardiovascular:      Rate and Rhythm: Normal rate and regular rhythm.      Heart sounds: Normal heart sounds. No murmur heard.     No friction rub. No gallop.   Pulmonary:      Effort: Pulmonary effort is normal.      Breath sounds: Normal breath sounds.   Abdominal:      Palpations: Abdomen is soft.   Musculoskeletal:         General: Normal range of motion.      Cervical back: Normal range of motion.   Skin:     General: Skin is warm and dry.   Neurological:      Mental Status: She is alert and oriented to person, place, and time.      Deep Tendon Reflexes: Reflexes are normal and symmetric.   Psychiatric:         Behavior: Behavior normal.         Thought Content: Thought content normal.         Judgment: Judgment normal.       This note was completed in part utilizing M-Modal Fluency Direct Software.  Grammatical errors, random word insertions, spelling mistakes, and incomplete sentences can be an occasional consequence of this system secondary to software limitations, ambient noise, and hardware issues.  If you have any questions or concerns about the content, text, or information contained within the body of this dictation, please contact the provider for clarification.

## 2025-02-05 ENCOUNTER — HOSPITAL ENCOUNTER (OUTPATIENT)
Dept: NON INVASIVE DIAGNOSTICS | Age: OVER 89
Discharge: HOME | End: 2025-02-05
Payer: COMMERCIAL

## 2025-02-05 VITALS
DIASTOLIC BLOOD PRESSURE: 88 MMHG | HEIGHT: 64 IN | BODY MASS INDEX: 19.29 KG/M2 | SYSTOLIC BLOOD PRESSURE: 156 MMHG | WEIGHT: 113 LBS | HEART RATE: 64 BPM

## 2025-02-05 DIAGNOSIS — R07.9 CHEST PAIN: ICD-10-CM

## 2025-02-05 LAB
BSA FOR ECHO PROCEDURE: 1.53 M2
E WAVE DECELERATION TIME: 160 MS
E/A RATIO: 1.37
FRACTIONAL SHORTENING: 33 (ref 28–44)
INTERVENTRICULAR SEPTUM IN DIASTOLE (PARASTERNAL SHORT AXIS VIEW): 0.7 CM
INTERVENTRICULAR SEPTUM: 0.7 CM (ref 0.6–1.1)
LEFT INTERNAL DIMENSION IN SYSTOLE: 2.7 CM (ref 2.1–4)
LEFT VENTRICLE DIASTOLIC VOLUME (MOD BIPLANE): 57 ML
LEFT VENTRICLE DIASTOLIC VOLUME INDEX (MOD BIPLANE): 37.3 ML/M2
LEFT VENTRICLE SYSTOLIC VOLUME (MOD BIPLANE): 23 ML
LEFT VENTRICLE SYSTOLIC VOLUME INDEX (MOD BIPLANE): 15 ML/M2
LEFT VENTRICULAR INTERNAL DIMENSION IN DIASTOLE: 4 CM (ref 3.5–6)
LEFT VENTRICULAR POSTERIOR WALL IN END DIASTOLE: 0.9 CM
LEFT VENTRICULAR STROKE VOLUME: 44 ML
LV EF BIPLANE MOD: 60 %
LV EF US.2D.A4C+ESTIMATED: 66 %
LVSV (TEICH): 44 ML
MITRAL REGURGITATION PEAK VELOCITY: 5.74 M/S
MITRAL VALVE MEAN INFLOW VELOCITY: 4.38 M/S
MITRAL VALVE REGURGITANT PEAK GRADIENT: 132 MMHG
MV EROA: 0.1 CM2
MV PEAK A VEL: 0.57 M/S
MV PEAK E VEL: 78 CM/S
MV REGURGITANT VOLUME: 15 ML
MV STENOSIS PRESSURE HALF TIME: 47 MS
MV VALVE AREA P 1/2 METHOD: 4.68
PISA MRMAX VEL: 0.6 M/S
PISA RADIUS: 0.3 CM
SL CV DOP CALC MV VTI RETROGRADE: 221.5 CM
SL CV LV EF: 65
SL CV MV MEAN GRADIENT RETROGRADE: 86 MMHG
SL CV PED ECHO LEFT VENTRICLE DIASTOLIC VOLUME (MOD BIPLANE) 2D: 71 ML
SL CV PED ECHO LEFT VENTRICLE SYSTOLIC VOLUME (MOD BIPLANE) 2D: 27 ML
SL CV TR MAX PG ANTEGRADE: 29 MMHG
TR MAX PG: 31 MMHG
TR PEAK VELOCITY: 2.8 M/S
TRICUSPID VALVE PEAK REGURGITATION VELOCITY: 2.8 M/S
TV MEAN GRADIENT: 21 MMHG
TV MV D: 2.22 M/S
TV VTI: 90.34 CM

## 2025-02-05 PROCEDURE — 93308 TTE F-UP OR LMTD: CPT

## 2025-02-05 PROCEDURE — 93308 TTE F-UP OR LMTD: CPT | Performed by: INTERNAL MEDICINE

## 2025-02-12 ENCOUNTER — TELEPHONE (OUTPATIENT)
Age: OVER 89
End: 2025-02-12

## 2025-02-17 ENCOUNTER — TELEPHONE (OUTPATIENT)
Age: OVER 89
End: 2025-02-17

## 2025-02-17 NOTE — TELEPHONE ENCOUNTER
Caller: Ashlie Villafuerte    Doctor: Jose R Rasmussen    Reason for call: Patient's daughter called in stating she still hasn't heard anything regarding her mom's echo. She is a bit worried and would like a call back as soon as possible.     (There was another telephone encounter opened regarding this but I opened this one by accident)    Call back#: [phone]

## 2025-04-18 ENCOUNTER — APPOINTMENT (EMERGENCY)
Dept: RADIOLOGY | Facility: HOSPITAL | Age: OVER 89
DRG: 185 | End: 2025-04-18
Payer: COMMERCIAL

## 2025-04-18 ENCOUNTER — APPOINTMENT (EMERGENCY)
Dept: CT IMAGING | Facility: HOSPITAL | Age: OVER 89
DRG: 185 | End: 2025-04-18
Payer: COMMERCIAL

## 2025-04-18 ENCOUNTER — HOSPITAL ENCOUNTER (INPATIENT)
Facility: HOSPITAL | Age: OVER 89
LOS: 3 days | Discharge: NURSING, REHAB OR LONG-TERM CARE | DRG: 185 | End: 2025-04-21
Attending: EMERGENCY MEDICINE | Admitting: INTERNAL MEDICINE
Payer: COMMERCIAL

## 2025-04-18 DIAGNOSIS — J90 PLEURAL EFFUSION: ICD-10-CM

## 2025-04-18 DIAGNOSIS — S22.49XA MULTIPLE RIB FRACTURES: Primary | ICD-10-CM

## 2025-04-18 DIAGNOSIS — K59.00 CONSTIPATION, UNSPECIFIED CONSTIPATION TYPE: ICD-10-CM

## 2025-04-18 PROBLEM — G89.11 ACUTE PAIN DUE TO TRAUMA: Status: ACTIVE | Noted: 2025-04-18

## 2025-04-18 PROBLEM — R26.2 AMBULATORY DYSFUNCTION: Status: ACTIVE | Noted: 2025-04-18

## 2025-04-18 PROBLEM — S22.41XA CLOSED FRACTURE OF MULTIPLE RIBS OF RIGHT SIDE: Status: ACTIVE | Noted: 2025-04-18

## 2025-04-18 LAB
2HR DELTA HS TROPONIN: -1 NG/L
ALBUMIN SERPL BCG-MCNC: 3.9 G/DL (ref 3.5–5)
ALP SERPL-CCNC: 45 U/L (ref 34–104)
ALT SERPL W P-5'-P-CCNC: 18 U/L (ref 7–52)
ANION GAP SERPL CALCULATED.3IONS-SCNC: 6 MMOL/L (ref 4–13)
AST SERPL W P-5'-P-CCNC: 29 U/L (ref 13–39)
ATRIAL RATE: 69 BPM
BACTERIA UR QL AUTO: ABNORMAL /HPF
BASOPHILS # BLD AUTO: 0.04 THOUSANDS/ÂΜL (ref 0–0.1)
BASOPHILS NFR BLD AUTO: 1 % (ref 0–1)
BILIRUB SERPL-MCNC: 0.67 MG/DL (ref 0.2–1)
BILIRUB UR QL STRIP: NEGATIVE
BUN SERPL-MCNC: 31 MG/DL (ref 5–25)
CALCIUM SERPL-MCNC: 9.2 MG/DL (ref 8.4–10.2)
CAOX CRY URNS QL MICRO: ABNORMAL /HPF
CARDIAC TROPONIN I PNL SERPL HS: 7 NG/L (ref ?–50)
CARDIAC TROPONIN I PNL SERPL HS: 8 NG/L (ref ?–50)
CHLORIDE SERPL-SCNC: 103 MMOL/L (ref 96–108)
CLARITY UR: CLEAR
CO2 SERPL-SCNC: 27 MMOL/L (ref 21–32)
COLOR UR: YELLOW
CREAT SERPL-MCNC: 0.98 MG/DL (ref 0.6–1.3)
EOSINOPHIL # BLD AUTO: 0.04 THOUSAND/ÂΜL (ref 0–0.61)
EOSINOPHIL NFR BLD AUTO: 1 % (ref 0–6)
ERYTHROCYTE [DISTWIDTH] IN BLOOD BY AUTOMATED COUNT: 13.2 % (ref 11.6–15.1)
GFR SERPL CREATININE-BSD FRML MDRD: 50 ML/MIN/1.73SQ M
GLUCOSE SERPL-MCNC: 94 MG/DL (ref 65–140)
GLUCOSE UR STRIP-MCNC: NEGATIVE MG/DL
HCT VFR BLD AUTO: 39.2 % (ref 34.8–46.1)
HGB BLD-MCNC: 12.8 G/DL (ref 11.5–15.4)
HGB UR QL STRIP.AUTO: ABNORMAL
IMM GRANULOCYTES # BLD AUTO: 0.02 THOUSAND/UL (ref 0–0.2)
IMM GRANULOCYTES NFR BLD AUTO: 0 % (ref 0–2)
KETONES UR STRIP-MCNC: NEGATIVE MG/DL
LEUKOCYTE ESTERASE UR QL STRIP: NEGATIVE
LIPASE SERPL-CCNC: 15 U/L (ref 11–82)
LYMPHOCYTES # BLD AUTO: 1.11 THOUSANDS/ÂΜL (ref 0.6–4.47)
LYMPHOCYTES NFR BLD AUTO: 17 % (ref 14–44)
MCH RBC QN AUTO: 31.3 PG (ref 26.8–34.3)
MCHC RBC AUTO-ENTMCNC: 32.7 G/DL (ref 31.4–37.4)
MCV RBC AUTO: 96 FL (ref 82–98)
MONOCYTES # BLD AUTO: 1.16 THOUSAND/ÂΜL (ref 0.17–1.22)
MONOCYTES NFR BLD AUTO: 18 % (ref 4–12)
MUCOUS THREADS UR QL AUTO: ABNORMAL
NEUTROPHILS # BLD AUTO: 4.24 THOUSANDS/ÂΜL (ref 1.85–7.62)
NEUTS SEG NFR BLD AUTO: 63 % (ref 43–75)
NITRITE UR QL STRIP: NEGATIVE
NON-SQ EPI CELLS URNS QL MICRO: ABNORMAL /HPF
NRBC BLD AUTO-RTO: 0 /100 WBCS
P AXIS: 79 DEGREES
PH UR STRIP.AUTO: 6 [PH]
PLATELET # BLD AUTO: 149 THOUSANDS/UL (ref 149–390)
PMV BLD AUTO: 8.5 FL (ref 8.9–12.7)
POTASSIUM SERPL-SCNC: 4 MMOL/L (ref 3.5–5.3)
PR INTERVAL: 234 MS
PROT SERPL-MCNC: 6.7 G/DL (ref 6.4–8.4)
PROT UR STRIP-MCNC: NEGATIVE MG/DL
QRS AXIS: 69 DEGREES
QRSD INTERVAL: 126 MS
QT INTERVAL: 404 MS
QTC INTERVAL: 432 MS
RBC # BLD AUTO: 4.09 MILLION/UL (ref 3.81–5.12)
RBC #/AREA URNS AUTO: ABNORMAL /HPF
SODIUM SERPL-SCNC: 136 MMOL/L (ref 135–147)
SP GR UR STRIP.AUTO: <1.005 (ref 1–1.03)
T WAVE AXIS: 37 DEGREES
UROBILINOGEN UR STRIP-ACNC: <2 MG/DL
VENTRICULAR RATE: 69 BPM
WBC # BLD AUTO: 6.61 THOUSAND/UL (ref 4.31–10.16)
WBC #/AREA URNS AUTO: ABNORMAL /HPF

## 2025-04-18 PROCEDURE — 96374 THER/PROPH/DIAG INJ IV PUSH: CPT

## 2025-04-18 PROCEDURE — 72125 CT NECK SPINE W/O DYE: CPT

## 2025-04-18 PROCEDURE — 74177 CT ABD & PELVIS W/CONTRAST: CPT

## 2025-04-18 PROCEDURE — 99285 EMERGENCY DEPT VISIT HI MDM: CPT

## 2025-04-18 PROCEDURE — 36415 COLL VENOUS BLD VENIPUNCTURE: CPT | Performed by: EMERGENCY MEDICINE

## 2025-04-18 PROCEDURE — 80053 COMPREHEN METABOLIC PANEL: CPT | Performed by: EMERGENCY MEDICINE

## 2025-04-18 PROCEDURE — 85025 COMPLETE CBC W/AUTO DIFF WBC: CPT | Performed by: EMERGENCY MEDICINE

## 2025-04-18 PROCEDURE — 97167 OT EVAL HIGH COMPLEX 60 MIN: CPT

## 2025-04-18 PROCEDURE — 83690 ASSAY OF LIPASE: CPT | Performed by: EMERGENCY MEDICINE

## 2025-04-18 PROCEDURE — 81001 URINALYSIS AUTO W/SCOPE: CPT | Performed by: EMERGENCY MEDICINE

## 2025-04-18 PROCEDURE — 71260 CT THORAX DX C+: CPT

## 2025-04-18 PROCEDURE — 94664 DEMO&/EVAL PT USE INHALER: CPT

## 2025-04-18 PROCEDURE — 99223 1ST HOSP IP/OBS HIGH 75: CPT

## 2025-04-18 PROCEDURE — 93010 ELECTROCARDIOGRAM REPORT: CPT | Performed by: INTERNAL MEDICINE

## 2025-04-18 PROCEDURE — 97163 PT EVAL HIGH COMPLEX 45 MIN: CPT

## 2025-04-18 PROCEDURE — 71045 X-RAY EXAM CHEST 1 VIEW: CPT

## 2025-04-18 PROCEDURE — 84484 ASSAY OF TROPONIN QUANT: CPT | Performed by: EMERGENCY MEDICINE

## 2025-04-18 PROCEDURE — 93005 ELECTROCARDIOGRAM TRACING: CPT

## 2025-04-18 PROCEDURE — 99223 1ST HOSP IP/OBS HIGH 75: CPT | Performed by: INTERNAL MEDICINE

## 2025-04-18 PROCEDURE — 70450 CT HEAD/BRAIN W/O DYE: CPT

## 2025-04-18 RX ORDER — METHOCARBAMOL 500 MG/1
1000 TABLET, FILM COATED ORAL ONCE
Status: COMPLETED | OUTPATIENT
Start: 2025-04-18 | End: 2025-04-18

## 2025-04-18 RX ORDER — HYDROMORPHONE HCL/PF 1 MG/ML
0.5 SYRINGE (ML) INJECTION ONCE
Refills: 0 | Status: COMPLETED | OUTPATIENT
Start: 2025-04-18 | End: 2025-04-18

## 2025-04-18 RX ORDER — OXYCODONE HYDROCHLORIDE 5 MG/1
5 TABLET ORAL EVERY 6 HOURS PRN
Refills: 0 | Status: DISCONTINUED | OUTPATIENT
Start: 2025-04-18 | End: 2025-04-21 | Stop reason: HOSPADM

## 2025-04-18 RX ORDER — POLYETHYLENE GLYCOL 3350 17 G/17G
17 POWDER, FOR SOLUTION ORAL DAILY PRN
Status: DISCONTINUED | OUTPATIENT
Start: 2025-04-18 | End: 2025-04-21 | Stop reason: HOSPADM

## 2025-04-18 RX ORDER — ACETAMINOPHEN 325 MG/1
975 TABLET ORAL EVERY 8 HOURS SCHEDULED
Status: DISCONTINUED | OUTPATIENT
Start: 2025-04-18 | End: 2025-04-21 | Stop reason: HOSPADM

## 2025-04-18 RX ORDER — LIDOCAINE 50 MG/G
1 PATCH TOPICAL ONCE
Status: COMPLETED | OUTPATIENT
Start: 2025-04-18 | End: 2025-04-18

## 2025-04-18 RX ORDER — HEPARIN SODIUM 5000 [USP'U]/ML
5000 INJECTION, SOLUTION INTRAVENOUS; SUBCUTANEOUS EVERY 8 HOURS SCHEDULED
Status: DISCONTINUED | OUTPATIENT
Start: 2025-04-18 | End: 2025-04-21 | Stop reason: HOSPADM

## 2025-04-18 RX ORDER — SENNOSIDES 8.6 MG
1 TABLET ORAL
Status: DISCONTINUED | OUTPATIENT
Start: 2025-04-18 | End: 2025-04-21 | Stop reason: HOSPADM

## 2025-04-18 RX ORDER — LEVOTHYROXINE SODIUM 25 UG/1
25 TABLET ORAL DAILY
Status: DISCONTINUED | OUTPATIENT
Start: 2025-04-19 | End: 2025-04-21 | Stop reason: HOSPADM

## 2025-04-18 RX ORDER — HYDROMORPHONE HCL IN WATER/PF 6 MG/30 ML
0.2 PATIENT CONTROLLED ANALGESIA SYRINGE INTRAVENOUS EVERY 4 HOURS PRN
Status: DISCONTINUED | OUTPATIENT
Start: 2025-04-18 | End: 2025-04-21 | Stop reason: HOSPADM

## 2025-04-18 RX ORDER — LIDOCAINE 50 MG/G
1 PATCH TOPICAL DAILY
Status: DISCONTINUED | OUTPATIENT
Start: 2025-04-19 | End: 2025-04-21 | Stop reason: HOSPADM

## 2025-04-18 RX ORDER — DOCUSATE SODIUM 100 MG/1
100 CAPSULE, LIQUID FILLED ORAL 2 TIMES DAILY
Status: DISCONTINUED | OUTPATIENT
Start: 2025-04-18 | End: 2025-04-21 | Stop reason: HOSPADM

## 2025-04-18 RX ADMIN — ACETAMINOPHEN 975 MG: 325 TABLET, FILM COATED ORAL at 13:55

## 2025-04-18 RX ADMIN — LIDOCAINE 1 PATCH: 700 PATCH TOPICAL at 09:16

## 2025-04-18 RX ADMIN — Medication 2.5 MG: at 14:01

## 2025-04-18 RX ADMIN — IOHEXOL 90 ML: 350 INJECTION, SOLUTION INTRAVENOUS at 09:44

## 2025-04-18 RX ADMIN — DOCUSATE SODIUM 100 MG: 100 CAPSULE, LIQUID FILLED ORAL at 16:55

## 2025-04-18 RX ADMIN — HYDROMORPHONE HYDROCHLORIDE 0.5 MG: 1 INJECTION, SOLUTION INTRAMUSCULAR; INTRAVENOUS; SUBCUTANEOUS at 09:16

## 2025-04-18 RX ADMIN — SENNOSIDES 8.6 MG: 8.6 TABLET, FILM COATED ORAL at 22:08

## 2025-04-18 RX ADMIN — HEPARIN SODIUM 5000 UNITS: 5000 INJECTION INTRAVENOUS; SUBCUTANEOUS at 22:09

## 2025-04-18 RX ADMIN — METHOCARBAMOL 1000 MG: 500 TABLET ORAL at 09:16

## 2025-04-18 RX ADMIN — Medication 2.5 MG: at 22:09

## 2025-04-18 RX ADMIN — HEPARIN SODIUM 5000 UNITS: 5000 INJECTION INTRAVENOUS; SUBCUTANEOUS at 13:55

## 2025-04-18 RX ADMIN — ACETAMINOPHEN 975 MG: 325 TABLET, FILM COATED ORAL at 22:08

## 2025-04-18 NOTE — ASSESSMENT & PLAN NOTE
Continue bowel regimen while utilizing opioids to prevent opioid-induced constipation.    Bowel regimen:  Colace 100 mg 3 times daily.  Senokot 8.6 mg tablet, 1 tablet daily at bedtime.

## 2025-04-18 NOTE — ASSESSMENT & PLAN NOTE
Lab Results   Component Value Date    EGFR 50 04/18/2025    EGFR 59 11/29/2024    EGFR 77 05/20/2024    CREATININE 0.98 04/18/2025    CREATININE 0.86 11/29/2024    CREATININE 0.69 05/20/2024     Review, baseline creatinine 0.8-1.0.  Cr within baseline.   Continue to avoid and/or limit nephrotoxins.  Continue to monitor via daily BMP.

## 2025-04-18 NOTE — PHYSICAL THERAPY NOTE
PHYSICAL THERAPY EVAL  Physical Therapy Evaluation    Performed at least 2 patient identifiers during session:  Patient Active Problem List   Diagnosis    Full thickness rotator cuff tear    Localized, primary osteoarthritis of shoulder region    Cervical spondylosis    Acquired hypothyroidism    Syncope    CHICAS (dyspnea on exertion)    Chronic kidney disease, stage 3a (HCC)    Closed fracture of multiple ribs of right side    Constipation    Ambulatory dysfunction       Past Medical History:   Diagnosis Date    Anxiety     Disease of thyroid gland        Past Surgical History:   Procedure Laterality Date    AUGMENTATION BREAST       SECTION      MASTECTOMY      SHOULDER SURGERY            25 1415   PT Last Visit   PT Visit Date 25   Note Type   Note type Evaluation   Pain Assessment   Pain Assessment Tool 0-10   Pain Score 8   Pain Location/Orientation Location: [facility]   Hospital Pain Intervention(s) Medication (See MAR);Repositioned   Restrictions/Precautions   Other Precautions Chair Alarm;Bed Alarm;Fall Risk;Pain   Home Living   Type of Home House   Home Layout Two level;Bed/bath upstairs;1/2 bath on main level;Stairs to enter with rails  (5STE)   Bathroom Shower/Tub Tub/shower unit   Bathroom Equipment Grab bars in shower   Home Equipment Cane   Additional Comments Does not use DME   Prior Function   Level of [place] Independent with ADLs;Independent with functional mobility;Needs assistance with IADLS   Lives With Alone   Receives Help From Family   IADLs Family/Friend/Other provides transportation;Independent with meal prep;Independent with medication management   Falls in the last 6 months 1 to 4   Vocational Retired   General   Family/Caregiver Present No   Cognition   Overall Cognitive Status WFL   Arousal/Participation Alert   Orientation Level Oriented X4   Memory Within functional limits  "  Following Commands Follows all commands and directions without difficulty   Subjective   Subjective \"I have so much pain.\"   RLE Assessment   RLE Assessment WFL   LLE Assessment   LLE Assessment WFL   Bed Mobility   Additional Comments Received OOB in chair   Transfers   Sit to Stand 4  Minimal assistance   Additional items Assist x 1;Armrests;Increased time required;Verbal cues   Stand to Sit 4  Minimal assistance   Additional items Assist x 1;Armrests;Verbal cues   Additional Comments (S)  Sitting BP with LE's elevated: 116/59 standing 73/41 sitting 94/45 sitting with LE's elevated 127/57   Balance   Static Sitting Fair +   Dynamic Sitting Fair +   Static Standing Fair   Endurance Deficit   Endurance Deficit Yes   Endurance Deficit Description Limited by medical status   Activity Tolerance   Activity Tolerance Treatment limited secondary to medical complications (Comment)   Medical Staff Made Aware Mikala JONES   Nurse Made Aware RN, Dot   Assessment   Prognosis Good   Problem List Decreased strength;Decreased endurance;Impaired balance;Decreased mobility;Pain   Assessment Patient is a 89y/o F who presented after a fall. Found to have R 10th and 11th rib fractures. Patient resides alone in a 2SH with steps to enter. She is independent without an AD. Current medical status includes pain, fall risk, orthostatic hypotension, decreased strength, balance, endurance and mobility. Patient reports 8/10 rib pain. Educated on bracing with pillow when coughing. Patient required min A for transfers. Stood for approx 1 minute for BP check. Patient with orthostatic hypotension. Unable to assess further mobility. At this time, recommending level 2 resources. Moderate intensity. The patient's AM-PAC Basic Mobility Inpatient Short Form Raw Score is 14. A Raw score of less than or equal to 17 suggests the patient may benefit from discharge to post-acute rehabilitation services. Please also refer to the recommendation of the " Physical Therapist for safe discharge planning.   Barriers to Discharge Inaccessible home environment;Decreased caregiver support   Goals   Patient Goals To have less pain   STG Expiration Date 04/25/25   Short Term Goal #1 1. Perform supine<>sit with HOB flat without use of bedrails ind 2. perform sit<>stand transfers mod I 3. Ambulate 200ft Ind 4. Ascend/descend 12 steps nonreciprocal pattern mod I level   PT Treatment Day 0   Plan   Treatment/Interventions Functional transfer training;LE strengthening/ROM;Therapeutic exercise;Endurance training;Patient/family training;Equipment eval/education;Bed mobility;Gait training;Spoke to nursing;OT   PT Frequency 3-5x/wk   Discharge Recommendation   Rehab Resource Intensity Level, PT II (Moderate Resource Intensity)   Additional Comments Pending progress   AM-PAC Basic Mobility Inpatient   Turning in Flat Bed Without Bedrails 3   Lying on Back to Sitting on Edge of Flat Bed Without Bedrails 3   Moving Bed to Chair 3   Standing Up From Chair Using Arms 3   Walk in Room 1   Climb 3-5 Stairs With Railing 1   Basic Mobility Inpatient Raw Score 14   Basic Mobility Standardized Score 35.55   [facility] Highest Level Of Mobility   -[facility] Goal 4: Move to chair/commode   -[facility] Achieved 5: Stand (1 or more minutes)   End of Consult   Patient Position at End of Consult Bedside chair;Bed/Chair alarm activated;All needs within reach     Lanette Montanez, PT             Patient Name: Aide Yanes  Today's Date: 4/18/2025

## 2025-04-18 NOTE — PLAN OF CARE
Problem: PHYSICAL THERAPY ADULT  Goal: Performs mobility at highest level of function for planned discharge setting.  See evaluation for individualized goals.  Description: Treatment/Interventions: Functional transfer training, LE strengthening/ROM, Therapeutic exercise, Endurance training, Patient/family training, Equipment eval/education, Bed mobility, Gait training, Spoke to nursing, OT          See flowsheet documentation for full assessment, interventions and recommendations.  Note: Prognosis: Good  Problem List: Decreased strength, Decreased endurance, Impaired balance, Decreased mobility, Pain  Assessment: Patient is a 89y/o F who presented after a fall. Found to have R 10th and 11th rib fractures. Patient resides alone in a 2SH with steps to enter. She is independent without an AD. Current medical status includes pain, fall risk, orthostatic hypotension, decreased strength, balance, endurance and mobility. Patient reports 8/10 rib pain. Educated on bracing with pillow when coughing. Patient required min A for transfers. Stood for approx 1 minute for BP check. Patient with orthostatic hypotension. Unable to assess further mobility. At this time, recommending level 2 resources. Moderate intensity. The patient's AM-PAC Basic Mobility Inpatient Short Form Raw Score is 14. A Raw score of less than or equal to 17 suggests the patient may benefit from discharge to post-acute rehabilitation services. Please also refer to the recommendation of the Physical Therapist for safe discharge planning.  Barriers to Discharge: Inaccessible home environment, Decreased caregiver support     Rehab Resource Intensity Level, PT: II (Moderate Resource Intensity)    See flowsheet documentation for full assessment.

## 2025-04-18 NOTE — ASSESSMENT & PLAN NOTE
Lab Results   Component Value Date    EGFR 50 04/18/2025    EGFR 59 11/29/2024    EGFR 77 05/20/2024    CREATININE 0.98 04/18/2025    CREATININE 0.86 11/29/2024    CREATININE 0.69 05/20/2024   Estimated Creatinine Clearance: 30.6 mL/min (by C-G formula based on SCr of 0.98 mg/dL).   Will attempt to minimize renally excreted pain medications.

## 2025-04-18 NOTE — ASSESSMENT & PLAN NOTE
"Patient presented to ED after worsening pain in right rib cage s/p mechanical fall on 4/16.  Initially, patient was seen in urgent care, where she was given IM pain injection.  Now, pain continued to worsen requiring ED evaluation.  CT c/a/p (4/18): \"Nondisplaced fractures, right posterolateral 10th and 11th ribs. No pneumothorax. Trace right effusion. Chronic compression deformity of T11.\"     Will order pain regimen.   Continue Tylenol 975, every 8 hours scheduled.  Continue oxycodone 2.5 mg, every 6 hours as needed for moderate pain.  Continue oxycodone 5 mg, every 6 hours as needed for severe pain.  Continue hydromorphone 0.2 mg, every 4 hours as needed for breakthrough pain.  Will order lidocaine patch, daily.   Continue rib fracture protocol.  Will consult PT/OT.  Continue fall precautions.   Continue coughing and deep breathing.   Continue incentive spirometry.   "

## 2025-04-18 NOTE — PLAN OF CARE
Problem: OCCUPATIONAL THERAPY ADULT  Goal: Performs self-care activities at highest level of function for planned discharge setting.  See evaluation for individualized goals.  Description: Treatment Interventions: ADL retraining, Functional transfer training, Endurance training, Equipment evaluation/education, Patient/family training, Compensatory technique education, Continued evaluation          See flowsheet documentation for full assessment, interventions and recommendations.   Note: Limitation: Decreased ADL status, Decreased endurance, Decreased self-care trans, Decreased high-level ADLs (increased pain)  Prognosis: Good  Assessment: Pt is a 89 y.o. female seen for OT evaluation at [facility], admitted 4/18/2025 w/ Closed fracture of multiple ribs of right side. OT completed extensive review of pt's medical and social history. Comorbidities affecting pt's functional performance at time of assessment include: constipation, ambulatory dysfunction, h/o RCT of R shoulder, syncope, CHICAS. Personal factors affecting pt at time of IE include: steps to enter environment, limited home support, difficulty performing ADLS, difficulty performing IADLS , and environment. Prior to admission, pt was living alone in a 2Sh with 5 MARTITA.  Pt was I w/  ADLS and w/ IADLS, (-) drove, & required use of no DME/AD PTA. Upon evaluation: Pt requires Min Ax1 for functional transfers, HIEN for functional mobility, Min A for UB ADLs and Mod A for LB ADLS 2* the following deficits impacting occupational performance: decreased strength, decreased balance, decreased tolerance, and increased pain. Full objective findings from OT assessment regarding body systems outlined above. Pt to benefit from continued skilled OT tx while in the hospital to address deficits as defined above and maximize level of functional independence w/ ADL's and functional mobility. Occupational Performance areas to address include: grooming, bathing/shower,  toilet hygiene, dressing, functional mobility, and clothing management. Based on findings, pt is of high complexity. The patient's raw score on the AM-PAC Daily Activity inpatient short form is 17, standardized score is 37.26, less than 39.4. Patients at this level are likely to benefit from DC to post-acute rehabilitation services. However, please refer to therapist recommendation for discharge planning given other factors that may influence destination. At this time, OT recommendations at time of discharge are DC with Level II - Moderate Rehab Resource Intensity.     Rehab Resource Intensity Level, OT: II (Moderate Resource Intensity) (Pending progress)

## 2025-04-18 NOTE — PLAN OF CARE
Problem: Prexisting or High Potential for Compromised Skin Integrity  Goal: Skin integrity is maintained or improved  Description: INTERVENTIONS:- Identify patients at risk for skin breakdown- Assess and monitor skin integrity- Assess and monitor nutrition and hydration status- Monitor labs - Assess for incontinence - Turn and reposition patient- Assist with mobility/ambulation- Relieve pressure over bony prominences- Avoid friction and shearing- Provide appropriate hygiene as needed including keeping skin clean and dry- Evaluate need for skin moisturizer/barrier cream- Collaborate with interdisciplinary team - Patient/family teaching- Consider wound care consult   Outcome: Progressing     Problem: PAIN - ADULT  Goal: Verbalizes/displays adequate comfort level or baseline comfort level  Description: Interventions:- Encourage patient to monitor pain and request assistance- Assess pain using appropriate pain scale- Administer analgesics based on type and severity of pain and evaluate response- Implement non-pharmacological measures as appropriate and evaluate response- Consider cultural and social influences on pain and pain management- Notify physician/advanced practitioner if interventions unsuccessful or patient reports new pain  Outcome: Progressing     Problem: INFECTION - ADULT  Goal: Absence or prevention of progression during hospitalization  Description: INTERVENTIONS:- Assess and monitor for signs and symptoms of infection- Monitor lab/diagnostic results- Monitor all insertion sites, i.e. indwelling lines, tubes, and drains- Monitor endotracheal if appropriate and nasal secretions for changes in amount and color- [place] appropriate cooling/warming therapies per order- Administer medications as ordered- Instruct and encourage patient and family to use good hand hygiene technique- Identify and instruct in appropriate isolation precautions for identified infection/condition  Outcome: Progressing  Goal:  Absence of fever/infection during neutropenic period  Description: INTERVENTIONS:- Monitor WBC  Outcome: Progressing     Problem: SAFETY ADULT  Goal: Patient will remain free of falls  Description: INTERVENTIONS:- Educate patient/family on patient safety including physical limitations- Instruct patient to call for assistance with activity - Consult OT/PT to assist with strengthening/mobility - Keep Call bell within reach- Keep bed low and locked with side rails adjusted as appropriate- Keep care items and personal belongings within reach- Initiate and maintain comfort rounds- Make Fall Risk Sign visible to staff- Offer Toileting every  Hours, in advance of need- Initiate/Maintain alarm- Obtain necessary fall risk management equipment: - Apply yellow socks and bracelet for high fall risk patients- Consider moving patient to room near nurses station  Outcome: Progressing  Goal: Maintain or return to baseline ADL function  Description: INTERVENTIONS:-  Assess patient's ability to carry out ADLs; assess patient's baseline for ADL function and identify physical deficits which impact ability to perform ADLs (bathing, care of mouth/teeth, toileting, grooming, dressing, etc.)- Assess/evaluate cause of self-care deficits - Assess range of motion- Assess patient's mobility; develop plan if impaired- Assess patient's need for assistive devices and provide as appropriate- Encourage maximum independence but intervene and supervise when necessary- Involve family in performance of ADLs- Assess for home care needs following discharge - Consider OT consult to assist with ADL evaluation and planning for discharge- Provide patient education as appropriate  Outcome: Progressing  Goal: Maintains/Returns to pre admission functional level  Description: INTERVENTIONS:- Perform AM-PAC 6 Click Basic Mobility/ Daily Activity assessment daily.- Set and communicate daily mobility goal to care team and patient/family/caregiver. - Collaborate  with rehabilitation services on mobility goals if consulted- Perform Range of Motion  times a day.- Reposition patient every  hours.- Dangle patient  times a day- Stand patient  times a day- Ambulate patient  times a day- Out of bed to chair  times a day - Out o times a day- Out of bed for toileting- Record patient progress and toleration of activity level   Outcome: Progressing     Problem: DISCHARGE PLANNING  Goal: Discharge to home or other facility with appropriate resources  Description: INTERVENTIONS:- Identify barriers to discharge w/patient and caregiver- Arrange for needed discharge resources and transportation as appropriate- Identify discharge learning needs (meds, wound care, etc.)- Arrange for interpretive services to assist at discharge as needed- Refer to Case Management Department for coordinating discharge planning if the patient needs post-hospital services based on physician/advanced practitioner order or complex needs related to functional status, cognitive ability, or social support system  Outcome: Progressing     Problem: Knowledge Deficit  Goal: Patient/family/caregiver demonstrates understanding of disease process, treatment plan, medications, and discharge instructions  Description: Complete learning assessment and assess knowledge base.Interventions:- Provide teaching at level of understanding- Provide teaching via preferred learning methods  Outcome: Progressing

## 2025-04-18 NOTE — DISCHARGE SUPPORT
Case Management Assessment & Discharge Planning Note    Patient name Aide GRIMALDO Joaqíun  Location /-01 MRN [id number]  : 1934 Date 2025       Current Admission Date: 2025  Current Admission Diagnosis:Closed fracture of multiple ribs of right side   Patient Active Problem List    Diagnosis Date Noted Date Diagnosed    Closed fracture of multiple ribs of right side 2025     Constipation 2025     Ambulatory dysfunction 2025     Acute pain due to trauma 2025     Chronic kidney disease, stage 3a (HCC) 2024     Acquired hypothyroidism 2024     Syncope 2024     CHICAS (dyspnea on exertion) 2024     Cervical spondylosis 2023     Full thickness rotator cuff tear 2023     Localized, primary osteoarthritis of shoulder region 2023       LOS (days): 0  Geometric Mean LOS (GMLOS) (days): 2.2  Days to GMLOS:2   Facility Authorization Initiated  [facility] received request for auth from : Daria hammond  Authorization Request Submitted for: SNF  Requested Start of Care Date: 25  Facility Name: [facility] NPI: [id number]  Facility MD: Dr. Bill Franklin  Facility MD NPI: [id number]  Authorization initiated by contacting insurance: [place] [employer]  Phone # Called: [phone] opt 4 opt 2  Clinicals submitted via: Phone - given verbally to nurse   notified: daria hammond         and Facility Authorization Approved  [place] [facility] received approved auth for: [facility]  Insurance: [place] [email or web address]  Authorization Obtained Via: Phone  Name/Phone # of Rep who called in determination: [email or web address]  Facility Name: [facility]  Approved Authorization Number: [id number]  Start of Care Date: 25  Next Review Date: 25  Submit Next Review to: call [phone]   notified: daria hammond     Updates to authorization status will be noted in chart.    Please reach out to CM for updates on any clinical information.

## 2025-04-18 NOTE — ED PROVIDER NOTES
"Emergency Department Trauma Note  Aide Yanes 89 y.o. female MRN: [id number]  Unit/Bed#: /-01 Encounter: [id number]      Trauma Alert: Trauma Acuity: Trauma Evaluation  Model of Arrival: Mode of Arrival: Direct from scene via    Trauma Team: Current Providers  Attending Provider: Luís Haro DO  Attending Provider: Jared Galicia MD  Registered Nurse: Valentine Bautista, RN  Registered Nurse: Cb Vargas RN  Registered Nurse: Esperanza Schmid RN  Patient Care Assistant: Alyx Patino  Consulting Physician: Acute Pain Service  Physical Therapist: Lanette Montanez PT  Consultants:     None      History of Present Illness     Chief Complaint:   Chief Complaint   Patient presents with    Fall     Pt arrives with daughter and states \"I fell Wednesday. I was trying to move a peddle stool and fell forward on the side and hit my head on a cushion chair\" Denies any loc blood thinners. Pt was seen by . Pt is complaining of right abd pain.      HPI:  Aide Yanes is a 89 y.o. female who presents with [facility] fall.  Mechanism:Details of Incident: pt fell striking head and back on chair          89-year-old female presents for evaluation of right sided flank and right lower quadrant abdominal pain.  Patient reports she had a mechanical fall 2 days ago and landed on her right side and also hit the right side of her head on a chair.  No loss of consciousness.  Reports severe pain over right flank region.  X-ray performed at urgent care revealed isolated rib fracture.      Review of Systems   Constitutional:  Negative for fever.   Gastrointestinal:  Positive for abdominal pain.   Genitourinary:  Positive for flank pain.       Historical Information     Immunizations:   Immunization History   Administered Date(s) Administered    COVID-19 Pfizer vac (Kwesi-sucrose, gray cap) 12 yr+ IM 01/12/2022       Past Medical History:   Diagnosis Date    Anxiety     Disease of thyroid gland        Family History "   Problem Relation Age of Onset    Breast cancer Mother         after menopuse  b/l mastectomy  pt states  3 sisters    Breast cancer Daughter     Ovarian cancer Neg Hx      Past Surgical History:   Procedure Laterality Date    AUGMENTATION BREAST       SECTION      MASTECTOMY      SHOULDER SURGERY       Social History     Tobacco Use    Smoking status: Never    Smokeless tobacco: Never   Vaping Use    Vaping status: Never Used   Substance Use Topics    Alcohol use: Not Currently    Drug use: Never     E-Cigarette/Vaping    E-Cigarette Use Never User      E-Cigarette/Vaping Substances    Nicotine No     THC No     CBD No     Flavoring No     Other No     Unknown No        Family History: non-contributory    Meds/Allergies   Prior to Admission Medications   Prescriptions Last Dose Informant Patient Reported? Taking?   Multiple Vitamins-Minerals (CENTRUM SILVER PO) 2025 Self Yes Yes   Sig: Take by mouth   Turmeric (QC TUMERIC COMPLEX PO) Not Taking Self Yes No   Sig: Take by mouth   Patient not taking: Reported on 7/10/2024   levothyroxine 25 mcg tablet 2025 Self Yes Yes   Sig: Take 25 mcg by mouth daily      Facility-Administered Medications: None       Allergies   Allergen Reactions    Hylan G-F 20 Other (See Comments)    Influenza Virus Vaccine Other (See Comments)       PHYSICAL EXAM    PE limited by: none    Objective   Vitals:   First set: Temperature: 97.7 °F (36.5 °C) (25 0858)  Pulse: 77 (25 0858)  Respirations: 20 (25 0858)  Blood Pressure: 109/55 (25 0858)  SpO2: 98 % (25 0858)    Primary Survey:   (A) Airway: intact  (B) Breathing: normal  (C) Circulation: Pulses:   normal  (D) Disabliity:  GCS Total:  15  (E) Expose:  Completed    Secondary Survey: (Click on Physical Exam tab above)  Physical Exam  Vitals and nursing note reviewed.   Constitutional:       Appearance: She is well-developed.   HENT:      Head: Normocephalic and atraumatic.      Right Ear:  External ear normal.      Left Ear: External ear normal.      Nose: Nose normal.   Eyes:      General: No scleral icterus.  Cardiovascular:      Rate and Rhythm: Normal rate.   Pulmonary:      Effort: Pulmonary effort is normal. No respiratory distress.   Abdominal:      General: There is no distension.      Tenderness: There is abdominal tenderness.      Comments: Tender in right lower quadrant   Musculoskeletal:         General: No deformity. Normal range of motion.      Cervical back: Normal range of motion.      Comments: Tender to palpation of right posterior lateral ribs   Skin:     Findings: No rash.   Neurological:      General: No focal deficit present.      Mental Status: She is alert and oriented to person, place, and time.   Psychiatric:         Mood and Affect: Mood normal.         Cervical spine cleared by clinical criteria? No (imaging required)      Invasive Devices       Peripheral Intravenous Line  Duration             Peripheral IV 04/18/25 Left;Proximal;Ventral (anterior) Forearm <1 day                    Lab Results:   Results Reviewed       Procedure Component Value Units Date/Time    HS Troponin I 2hr [[id number]]  (Normal) Collected: 04/18/25 1142    Lab Status: Final result Specimen: Blood from Arm, Left Updated: 04/18/25 1212     hs TnI 2hr 7 ng/L      Delta 2hr hsTnI -1 ng/L     Urine Microscopic [[id number]]  (Abnormal) Collected: 04/18/25 1037    Lab Status: Final result Specimen: Urine, Other Updated: 04/18/25 1115     RBC, UA 0-1 /hpf      WBC, UA None Seen /hpf      Epithelial Cells Occasional /hpf      Bacteria, UA None Seen /hpf      MUCUS THREADS None Seen     Ca Oxalate Khloe, UA Occasional /hpf     UA w Reflex to Microscopic w Reflex to Culture [[id number]]  (Abnormal) Collected: 04/18/25 1037    Lab Status: Final result Specimen: Urine, Other Updated: 04/18/25 1114     Color, UA Yellow     Clarity, UA Clear     Specific Gravity, UA <1.005     pH, UA 6.0     Leukocytes, UA Negative      Nitrite, UA Negative     Protein, UA Negative mg/dl      Glucose, UA Negative mg/dl      Ketones, UA Negative mg/dl      Urobilinogen, UA <2.0 mg/dl      Bilirubin, UA Negative     Occult Blood, UA Small    HS Troponin I 4hr [[id number]]     Lab Status: No result Specimen: Blood     HS Troponin 0hr (reflex protocol) [[id number]]  (Normal) Collected: 04/18/25 0914    Lab Status: Final result Specimen: Blood from Arm, Left Updated: 04/18/25 0947     hs TnI 0hr 8 ng/L     Comprehensive metabolic panel [[id number]]  (Abnormal) Collected: 04/18/25 0914    Lab Status: Final result Specimen: Blood from Arm, Left Updated: 04/18/25 0940     Sodium 136 mmol/L      Potassium 4.0 mmol/L      Chloride 103 mmol/L      CO2 27 mmol/L      ANION GAP 6 mmol/L      BUN 31 mg/dL      Creatinine 0.98 mg/dL      Glucose 94 mg/dL      Calcium 9.2 mg/dL      AST 29 U/L      ALT 18 U/L      Alkaline Phosphatase 45 U/L      Total Protein 6.7 g/dL      Albumin 3.9 g/dL      Total Bilirubin 0.67 mg/dL      eGFR 50 ml/min/1.73sq m     Narrative:      National Kidney Disease Foundation guidelines for Chronic Kidney Disease (CKD):     Stage 1 with normal or high GFR (GFR > 90 mL/min/1.73 square meters)    Stage 2 Mild CKD (GFR = 60-89 mL/min/1.73 square meters)    Stage 3A Moderate CKD (GFR = 45-59 mL/min/1.73 square meters)    Stage 3B Moderate CKD (GFR = 30-44 mL/min/1.73 square meters)    Stage 4 Severe CKD (GFR = 15-29 mL/min/1.73 square meters)    Stage 5 End Stage CKD (GFR <15 mL/min/1.73 square meters)  Note: GFR calculation is accurate only with a steady state creatinine    Lipase [[id number]]  (Normal) Collected: 04/18/25 0914    Lab Status: Final result Specimen: Blood from Arm, Left Updated: 04/18/25 0940     Lipase 15 u/L     CBC and differential [[id number]]  (Abnormal) Collected: 04/18/25 0914    Lab Status: Final result Specimen: Blood from Arm, Left Updated: 04/18/25 0924     WBC 6.61 Thousand/uL      RBC 4.09 Million/uL       Hemoglobin 12.8 g/dL      Hematocrit 39.2 %      MCV 96 fL      MCH 31.3 pg      MCHC 32.7 g/dL      RDW 13.2 %      MPV 8.5 fL      Platelets 149 Thousands/uL      nRBC 0 /100 WBCs      Segmented % 63 %      Immature Grans % 0 %      Lymphocytes % 17 %      Monocytes % 18 %      Eosinophils Relative 1 %      Basophils Relative 1 %      Absolute Neutrophils 4.24 Thousands/µL      Absolute Immature Grans 0.02 Thousand/uL      Absolute Lymphocytes 1.11 Thousands/µL      Absolute Monocytes 1.16 Thousand/µL      Eosinophils Absolute 0.04 Thousand/µL      Basophils Absolute 0.04 Thousands/µL                    Imaging Studies:   Direct to CT: Yes  TRAUMA - CT head wo contrast   Final Result by Reyes Maria MD (04/18 1006)      No acute intracranial abnormality.                  Workstation performed: [id number]         TRAUMA - CT spine cervical wo contrast   Final Result by Reyes Maria MD (04/18 1009)      No cervical spine fracture or traumatic malalignment.                  Workstation performed: [id number]         TRAUMA - CT chest abdomen pelvis w contrast   Final Result by Reyes Maria MD (04/18 1027)      1. Nondisplaced fractures, right posterolateral 10th and 11th ribs. No pneumothorax. Trace right effusion.   2. Chronic compression deformity of T11   3. No acute traumatic injury in the abdomen or pelvis   4. Increase stool within the rectum      The study was marked in EPIC for immediate notification.                  Workstation performed: [id number]         XR Trauma chest portable   Final Result by Airam Crow MD (04/18 0932)      No acute cardiopulmonary disease.            Workstation performed: [id number]               Procedures  Procedures         ED Course  ED Course as of 04/18/25 1414   Fri Apr 18, 2025   0915 Procedure Note: EKG  Date/Time: 04/18/25 9:15 AM   Performed by: Luís Haro  Authorized by: Luís Haro  ECG interpreted by me, the ED Provider: yes   The EKG  demonstrates:  Rate 69  Rhythm Sinus with 1st degree block  QTc 432  No ST elevations/depressions             Medical Decision Making  89-year-old female presenting with mechanical fall 2 days ago.  Given known rib fracture and worsening pain, concern for other traumatic injury such as pneumothorax, hemoperitoneum, intracranial process.  Trauma evaluation called.  Obtain CT head, chest abdomen pelvis.  Symptom control as needed.    CT reveals multiple rib fractures.  Pain control as needed.  Incentive spirometry.  Admission for pain control and possible rehab.    Amount and/or Complexity of Data Reviewed  Labs: ordered.  Radiology: ordered.    Risk  Prescription drug management.  Decision regarding hospitalization.                Disposition  [facility] Transfer: No  Final diagnoses:   Multiple rib fractures   Pleural effusion     Time reflects when diagnosis was documented in both MDM as applicable and the Disposition within this note       Time User Action Codes Description Comment    4/18/2025 12:03 PM Luís Haro [S22.49XA] Multiple rib fractures     4/18/2025 12:04 PM Luís Haro [J90] Pleural effusion           ED Disposition       ED Disposition   Admit    Condition   Stable    Date/Time   Fri Apr 18, 2025 12:03 PM    Comment   Case was discussed with BIJAN and the patient's admission status was agreed to be Admission Status: inpatient status to the service of Dr. Galicia .               Follow-up Information    None       Current Discharge Medication List        CONTINUE these medications which have NOT CHANGED    Details   levothyroxine 25 mcg tablet Take 25 mcg by mouth daily      Multiple Vitamins-Minerals (CENTRUM SILVER PO) Take by mouth      Turmeric (QC TUMERIC COMPLEX PO) Take by mouth           No discharge procedures on file.    PDMP Review       None            ED Provider  Electronically Signed by           Luís Haro DO  04/18/25 8547

## 2025-04-18 NOTE — H&P
"H&P - Hospitalist   Name: Aide Yanes 89 y.o. female I MRN: [id number]  Unit/Bed#: -01 I Date of Admission: 4/18/2025   Date of Service: 4/18/2025 I Hospital Day: 0     Assessment & Plan  Closed fracture of multiple ribs of right side  Patient presented to ED after worsening pain in right rib cage s/p mechanical fall on 4/16.  Initially, patient was seen in urgent care, where she was given IM pain injection.  Now, pain continued to worsen requiring ED evaluation.  CT c/a/p (4/18): \"Nondisplaced fractures, right posterolateral 10th and 11th ribs. No pneumothorax. Trace right effusion. Chronic compression deformity of T11.\"     Will order pain regimen.   Continue Tylenol 975, every 8 hours scheduled.  Continue oxycodone 2.5 mg, every 6 hours as needed for moderate pain.  Continue oxycodone 5 mg, every 6 hours as needed for severe pain.  Continue hydromorphone 0.2 mg, every 4 hours as needed for breakthrough pain.  Will order lidocaine patch, daily.   Continue rib fracture protocol.  Will consult PT/OT.  Continue fall precautions.   Continue coughing and deep breathing.   Continue incentive spirometry.   Ambulatory dysfunction  Patient presented after mechanical fall off of a elliptical.  Patient now with worsening ambulatory dysfunction, likely due to to pain.  Will consult PT/OT.   Continue fall precautions.  Continue further management, as described above.  Constipation  Per review, CT c/a/p scan showing increased stool in rectum.  Will order bowel regimen.  Colace 100 mg, twice daily  Senna 8.6 mg, nightly  MiraLAX daily PRN  Encourage ambulation, as tolerated.  Chronic kidney disease, stage 3a (HCC)  Lab Results   Component Value Date    EGFR 50 04/18/2025    EGFR 59 11/29/2024    EGFR 77 05/20/2024    CREATININE 0.98 04/18/2025    CREATININE 0.86 11/29/2024    CREATININE 0.69 05/20/2024     Review, baseline creatinine 0.8-1.0.  Cr within baseline.   Continue to avoid and/or limit nephrotoxins.  Continue " "to monitor via daily BMP.  Acquired hypothyroidism  Continue levothyroxine 25 mcg, daily.      VTE Pharmacologic Prophylaxis: VTE Score: 3 Moderate Risk (Score 3-4) - Pharmacological DVT Prophylaxis Ordered: heparin.  Code Status: Level 3 - DNAR and DNI   Discussion with family: Updated  (daughter) at bedside.    Anticipated Length of Stay: Patient will be admitted on an inpatient basis with an anticipated length of stay of greater than 2 midnights secondary to pain control, PT/OT evaluations, and pending rehabilitation placement.    History of Present Illness   Chief Complaint: \"I don't know why I am having so much pain in my right side.\"     Aide Yanes is a 89 y.o. female with a PMH of hypothyroidism, anxiety who presents s/p mechanical fall on .  The patient explaining that she was utilizing an elliptical device to strengthen her legs, where she became off balanced and sustained mechanical fall.  The patient explains that she did fall into a leather recliner, but experienced pain in her right chest wall.  The patient was seen at an urgent care, but had received pain medication and sent home. Unfortunately, the patient's pain continued to worsen and patient with ambulatory dysfunction, as she is incredibly limited due to pain.  The patient's family expressing that due to worsening pain and ambulatory dysfunction, thought that it be best to be evaluated in ED for further recommendations.  In ED, patient had CT c/a/p completed which showed fractures of 10th/11th ribs in the right posterior lateral rib cage.  Patient to be admitted for further evaluation, pain control, and PT/OT consultation.    Review of Systems    Historical Information   Past Medical History:   Diagnosis Date    Anxiety     Disease of thyroid gland      Past Surgical History:   Procedure Laterality Date    AUGMENTATION BREAST       SECTION      MASTECTOMY      SHOULDER SURGERY       Social History     Tobacco Use    " Smoking status: Never    Smokeless tobacco: Never   Vaping Use    Vaping status: Never Used   Substance and Sexual Activity    Alcohol use: Not Currently    Drug use: Never    Sexual activity: Not Currently     E-Cigarette/Vaping    E-Cigarette Use Never User      E-Cigarette/Vaping Substances    Nicotine No     THC No     CBD No     Flavoring No     Other No     Unknown No        Social History:  Marital Status:    Patient Pre-hospital Living Situation: Home  Patient Pre-hospital Level of Mobility: walks  Patient Pre-hospital Diet Restrictions: None    Meds/Allergies   I have reviewed home medications with patient personally.  Prior to Admission medications    Medication Sig Start Date End Date Taking? Authorizing Provider   levothyroxine 25 mcg tablet Take 25 mcg by mouth daily    Historical Provider, MD   Multiple Vitamins-Minerals (CENTRUM SILVER PO) Take by mouth    Historical Provider, MD   Turmeric (QC TUMERIC COMPLEX PO) Take by mouth  Patient not taking: Reported on 7/10/2024    Historical Provider, MD     Allergies   Allergen Reactions    Hylan G-F 20 Other (See Comments)    Influenza Virus Vaccine Other (See Comments)       Objective :  Temp:  [97.7 °F (36.5 °C)] 97.7 °F (36.5 °C)  HR:  [64-86] 72  BP: ()/(55-79) 97/64  Resp:  [14-21] 20  SpO2:  [75 %-99 %] 94 %  O2 Device: None (Room air)    Physical Exam  Vitals and nursing note reviewed.   Constitutional:       General: She is awake. She is not in acute distress.     Appearance: Normal appearance. She is not ill-appearing.   HENT:      Head: Normocephalic and atraumatic.   Eyes:      General: No scleral icterus.     Conjunctiva/sclera: Conjunctivae normal.      Comments: On exam, patient wears glasses.   Cardiovascular:      Rate and Rhythm: Normal rate and regular rhythm.      Heart sounds: Normal heart sounds, S1 normal and S2 normal. No murmur heard.  Pulmonary:      Effort: Pulmonary effort is normal. No respiratory distress.       "Breath sounds: Normal breath sounds. No wheezing, rhonchi or rales.   Chest:      Chest wall: Tenderness (R rib cage) present.      Comments: On exam, patient with pain to palpation on right lateral aspect of chest wall.   Abdominal:      General: Bowel sounds are normal. There is no distension.      Tenderness: There is no abdominal tenderness.   Musculoskeletal:      Cervical back: Normal range of motion.      Right lower leg: No edema.      Left lower leg: No edema.   Skin:     General: Skin is warm and dry.      Comments: On exam, no evidence of open wounds/lesions on exposed skin.   Neurological:      Mental Status: She is alert and oriented to person, place, and time.      Sensory: Sensation is intact.      Motor: Weakness (generalized) present.   Psychiatric:         Attention and Perception: Attention normal.         Mood and Affect: Mood normal.         Speech: Speech normal.         Behavior: Behavior is cooperative.        Lines/Drains:            Lab Results: I have reviewed the following results:  Results from last 7 days   Lab Units 04/18/25  0914   WBC Thousand/uL 6.61   HEMOGLOBIN g/dL 12.8   HEMATOCRIT % 39.2   PLATELETS Thousands/uL 149   SEGS PCT % 63   LYMPHO PCT % 17   MONO PCT % 18*   EOS PCT % 1     Results from last 7 days   Lab Units 04/18/25  0914   SODIUM mmol/L 136   POTASSIUM mmol/L 4.0   CHLORIDE mmol/L 103   CO2 mmol/L 27   BUN mg/dL 31*   CREATININE mg/dL 0.98   ANION GAP mmol/L 6   CALCIUM mg/dL 9.2   ALBUMIN g/dL 3.9   TOTAL BILIRUBIN mg/dL 0.67   ALK PHOS U/L 45   ALT U/L 18   AST U/L 29   GLUCOSE RANDOM mg/dL 94             No results found for: \"HGBA1C\"              Administrative Statements       ** Please Note: This note has been constructed using a voice recognition system. **    "

## 2025-04-18 NOTE — ASSESSMENT & PLAN NOTE
"Patient sustained right sided 10th and 11th rib fractures following a fall.    CT c/a/p (4/18): \"Nondisplaced fractures, right posterolateral 10th and 11th ribs. No pneumothorax. Trace right effusion. Chronic compression deformity of T11.\"     Rib Fracture Evaluation:  Chest tube: no  Respiratory Co-morbidities: General debility  SpO2:   SPO2 RA Rest      Flowsheet Row ED to Hosp-Admission (Current) from 4/18/2025 in [facility] Med Surg Unit   SpO2 97 %   SpO2 Activity At Rest   O2 Device None (Room air)   O2 Flow Rate --                                                                             Incentive Spirometer: Incentive Spirometry Achieved (mL): 1250 mL   Platelet Count:   Results from last 7 days   Lab Units 04/18/25  0914   PLATELETS Thousands/uL 149     Coags:     Home anticoagulants: none   VTE covered by:  heparin (porcine), Subcutaneous, 5,000 Units at 04/18/25 6619        "

## 2025-04-18 NOTE — PROGRESS NOTES
Patient:    MRN:  [id number]    Aryan Request ID:  [id number]    Level of care reserved:  Skilled Nursing Facility    Partner Reserved:  Jamilah Grady [facility], [place] [address] [phone]    Clinical needs requested:    Geography searched:  10 miles around [phone]    Start of Service:    Request sent:  3:25pm EDT on 4/18/2025 by Daria Huff    Partner reserved:  3:48pm EDT on 4/18/2025 by Daria Huff    Choice list shared:  3:48pm EDT on 4/18/2025 by Daria Huff

## 2025-04-18 NOTE — OCCUPATIONAL THERAPY NOTE
Occupational Therapy Evaluation     Patient Name: Aide Yanes  Today's Date: 2025  Problem List  Principal Problem:    Closed fracture of multiple ribs of right side  Active Problems:    Acquired hypothyroidism    Chronic kidney disease, stage 3a (HCC)    Constipation    Ambulatory dysfunction    Acute pain due to trauma    Past Medical History  Past Medical History:   Diagnosis Date    Anxiety     Disease of thyroid gland      Past Surgical History  Past Surgical History:   Procedure Laterality Date    AUGMENTATION BREAST       SECTION      MASTECTOMY      SHOULDER SURGERY          25 1411   OT Last Visit   OT Visit Date 25   Note Type   Note type Evaluation   Pain Assessment   Pain Assessment Tool 0-10   Pain Score 8   Pain Location/Orientation Location: Rib Cage   Patient's Stated Pain Goal No pain   Hospital Pain Intervention(s) Ambulation/increased activity;Repositioned   Restrictions/Precautions   Weight Bearing Precautions Per Order No   Other Precautions Chair Alarm;Bed Alarm;Fall Risk;Pain   Home Living   Type of Home House   Home Layout Two level;Stairs to enter with rails;Bed/bath upstairs;1/2 bath on main level  (5 MARTITA)   Bathroom Shower/Tub Tub/shower unit   Bathroom Equipment Grab bars in shower   Home Equipment Cane   Additional Comments Does not use DME   Prior Function   Level of [place] Independent with ADLs;Independent with functional mobility;Independent with IADLS   Lives With Alone   Receives Help From Family   IADLs Family/Friend/Other provides transportation;Independent with meal prep;Independent with medication management  (Buys pre-cooked meals)   Falls in the last 6 months 1 to 4   Vocational Retired   Comments 1 fall on Wednesday - states she was using pedal exerciser   Lifestyle   Autonomy IND with ADLs   Reciprocal Relationships Lives alone, supportive children   Service to Others Retired schoolbus    Intrinsic Gratification Enjoys getting her  "nails done   General   Family/Caregiver Present No   Subjective   Subjective \"This pain is horrible\"   ADL   Eating Assistance 7  Independent   Grooming Assistance 5  Supervision/Setup   UB Bathing Assistance 5  Supervision/Setup   LB Bathing Assistance 3  Moderate Assistance   UB Dressing Assistance 4  Minimal Assistance   LB Dressing Assistance 3  Moderate Assistance   Toileting Assistance  3  Moderate Assistance   Bed Mobility   Supine to Sit Unable to assess   Additional Comments Received OOB in chair   Transfers   Sit to Stand 4  Minimal assistance   Additional items Assist x 1;Armrests;Increased time required;Verbal cues   Stand to Sit 4  Minimal assistance   Additional items Assist x 1;Armrests;Increased time required;Verbal cues   Additional Comments Sitting BP with LE's elevated: 116/59; standing 73/41; sitting 94/45; sitting with LE's elevated 127/57   Balance   Static Sitting Fair +   Dynamic Sitting Fair +   Static Standing Fair   Activity Tolerance   Activity Tolerance Treatment limited secondary to medical complications (Comment)  (Orthostatic Hypotension)   Medical Staff Made Aware PT Kim   Nurse Made Aware RN Dot   RUE Assessment   RUE Assessment X  (AROM/MMT deferred 2/2 rib fx/pain)   LUE Assessment   LUE Assessment X  (AROM/MMT deferred 2/2 rib fx/pain - 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 female seen for OT evaluation at [facility], admitted 4/18/2025 w/ Closed fracture of multiple ribs of right side. OT completed extensive review of pt's " medical and social history. Comorbidities affecting pt's functional performance at time of assessment include: constipation, ambulatory dysfunction, h/o RCT of R shoulder, syncope, CHICAS. Personal factors affecting pt at time of IE include: steps to enter environment, limited home support, difficulty performing ADLS, difficulty performing IADLS , and environment. Prior to admission, pt was living alone in a 2Sh with 5 MARTITA.  Pt was I w/  ADLS and w/ IADLS, (-) drove, & required use of no DME/AD PTA. Upon evaluation: Pt requires Min Ax1 for functional transfers, HIEN for functional mobility, Min A for UB ADLs and Mod A for LB ADLS 2* the following deficits impacting occupational performance: decreased strength, decreased balance, decreased tolerance, and increased pain. Full objective findings from OT assessment regarding body systems outlined above. Pt to benefit from continued skilled OT tx while in the hospital to address deficits as defined above and maximize level of functional independence w/ ADL's and functional mobility. Occupational Performance areas to address include: grooming, bathing/shower, toilet hygiene, dressing, functional mobility, and clothing management. Based on findings, pt is of high complexity. The patient's raw score on the AM-PAC Daily Activity inpatient short form is 17, standardized score is 37.26, less than 39.4. Patients at this level are likely to benefit from DC to post-acute rehabilitation services. However, please refer to therapist recommendation for discharge planning given other factors that may influence destination. At this time, OT recommendations at time of discharge are DC with Level II - Moderate Rehab Resource Intensity.   Goals   Patient Goals To have less pain   Plan   Treatment Interventions ADL retraining;Functional transfer training;Endurance training;Equipment evaluation/education;Patient/family training;Compensatory technique education;Continued evaluation   Goal  Expiration Date 04/28/25   OT Treatment Day 0   OT Frequency 3-5x/wk   Discharge Recommendation   Rehab Resource Intensity Level, OT II (Moderate Resource Intensity)  (Pending progress)   AM-PAC Daily Activity Inpatient   Lower Body Dressing 2   Bathing 2   Toileting 3   Upper Body Dressing 3   Grooming 3   Eating 4   Daily Activity Raw Score 17   Daily Activity Standardized Score (Calc for Raw Score >=11) 37.26   AM-PAC Applied Cognition Inpatient   Following a Speech/Presentation 4   Understanding Ordinary Conversation 4   Taking Medications 4   Remembering Where Things Are Placed or Put Away 4   Remembering List of 4-5 Errands 4   Taking Care of Complicated Tasks 4   Applied Cognition Raw Score 24   Applied Cognition Standardized Score 62.21   End of Consult   Education Provided Yes   Patient Position at End of Consult Bedside chair;All needs within reach;Bed/Chair alarm activated   Nurse Communication Nurse aware of consult     Pt will achieve the following goals within 10 days.    *Pt will complete grooming with IND.    *Pt will complete UB bathing and dressing with IND.    *Pt will complete LB bathing and dressing with Mod I & AD PRN.    *Pt will complete toileting w/ Mod I w/ G hygiene/thoroughness using AD PRN    *Pt will verbalize and demonstrate understanding in use of compensatory techniques and use of long handled AE for increased safety and independence during LB ADL tasks.       *Pt will verbalize and demonstrate good body mechanics and joint protection techniques during  ADLs/ IADLs with no verbal cues.    *Pt will complete bed mobility independently, with bed flat and no side rail to prep for purposeful tasks    *Pt will perform functional transfers with on/off all surfaces with Mod I using DME as needed w/ G balance/safety.    *Pt will improve functional mobility during ADL/IADL/leisure tasks to Mod I using DME as needed w/ G balance/safety.    *Pt will demonstrate 100% carryover of precautions s/p  review w/o cues w/ Mod I w/ G tolerance/participation t/o functional ADL/IADL/leisure tasks.    Mikala Mooney, OTR/L

## 2025-04-18 NOTE — CONSULTS
"Consultation - Acute Pain   Name: Aied Yanes 89 y.o. female I MRN: [id number]  Unit/Bed#: -01 I Date of Admission: 4/18/2025   Date of Service: 4/18/2025 I Hospital Day: 0   Consult Acute Pain Service (>/= 64 y/o with at Least 1 Rib Fracture)  Consult performed by: MAXINE Majano  Consult ordered by: MAXINE Sosa        Physician Requesting Evaluation: Jared Galicia MD   Reason for Evaluation / Principal Problem: rib fx protocol    Assessment & Plan  Acute pain due to trauma  Multimodal analgesia:  Tylenol 975 mg every 8 hours scheduled  Lidoderm patch, 12 hours on/12 hours off  Oxycodone 2.5 mg every 6 hours as needed for moderate pain  Oxycodone 5 mg every 6 hours as needed for severe pain  IV Dilaudid 0.2 mg every 4 hours as needed for breakthrough pain  Narcan as needed for respiratory depression/opioid reversal    Closely monitor for oversedation/respiratory depression following administration of opioid pain analgesics.  If pain remains well-controlled on oral regimen recommend discontinuation of IV opioids within the next 24 hours.  Closed fracture of multiple ribs of right side  Patient sustained right sided 10th and 11th rib fractures following a fall.    CT c/a/p (4/18): \"Nondisplaced fractures, right posterolateral 10th and 11th ribs. No pneumothorax. Trace right effusion. Chronic compression deformity of T11.\"     Rib Fracture Evaluation:  Chest tube: no  Respiratory Co-morbidities: General debility  SpO2:   SPO2 RA Rest      Flowsheet Row ED to Hosp-Admission (Current) from 4/18/2025 in [facility] Med Surg Unit   SpO2 97 %   SpO2 Activity At Rest   O2 Device None (Room air)   O2 Flow Rate --                                                                             Incentive Spirometer: Incentive Spirometry Achieved (mL): 1250 mL   Platelet Count:   Results from last 7 days   Lab Units 04/18/25  0914   PLATELETS Thousands/uL 149     Coags:     Home " anticoagulants: none   VTE covered by:  heparin (porcine), Subcutaneous, 5,000 Units at 04/18/25 1355        Chronic kidney disease, stage 3a (HCC)  Lab Results   Component Value Date    EGFR 50 04/18/2025    EGFR 59 11/29/2024    EGFR 77 05/20/2024    CREATININE 0.98 04/18/2025    CREATININE 0.86 11/29/2024    CREATININE 0.69 05/20/2024   Estimated Creatinine Clearance: 30.6 mL/min (by C-G formula based on SCr of 0.98 mg/dL).   Will attempt to minimize renally excreted pain medications.  Constipation  Continue bowel regimen while utilizing opioids to prevent opioid-induced constipation.    Bowel regimen:  Colace 100 mg 3 times daily.  Senokot 8.6 mg tablet, 1 tablet daily at bedtime.      Patient reports improvement in pain following administration of low-dose oxycodone.  She has been up with physical therapy.  Reports mild intermittent shortness of breath which is her baseline, inspiring <500 mL on spirometry.  Patient may continue the current analgesic regimen as outlined above. APS will sign off at this time, however please reach out for any further concerns from an acute pain standpoint.      APS will sign off at this time. Thank you for the consult. All opioids and other analgesics to be written at discretion of primary team. Please contact Acute Pain Service - via [employer] from [phone] with additional questions or concerns. See [employer] or [employer] for additional contacts and after hours information.    History of Present Illness    HPI: Aide Yanes is a 89 y.o. year old female who presented following a mechanical fall resulting in right-sided rib fractures #10 and #11.  APS consulted as a part of the rib fracture protocol for assistance in posttraumatic pain management.    Patient seen via telehealth consultation this afternoon where she was sitting up in the chair without acute distress.  She reports moderate right-sided posterior chest wall pain currently which has improved somewhat following  administration of low-dose oxycodone.  She reports some mild intermittent shortness of breath which she states is her baseline, and currently is maintaining oxygen saturations on room air.  She is able to inspire just under 500 mL with spirometry and has been up with physical therapy this afternoon.  She has no other acute complaints currently.    Current pain location(s): Pain Score: 8  Pain Location/Orientation: Location: Rib Cage  Pain Scale: Pain Assessment Tool: 0-10      Pain History: None  I have reviewed the patient's controlled substance dispensing history in the Prescription Drug Monitoring Program in compliance with the [facility] regulations before prescribing any controlled substances.     Review of Systems   Cardiovascular:  Positive for chest pain (R posterior chest wall).   All other systems reviewed and are negative.    Medical History Review: I have reviewed the patient's PMH, PSH, Social History, Family History, Meds, and Allergies     Objective :  Temp:  [97.7 °F (36.5 °C)-97.9 °F (36.6 °C)] 97.9 °F (36.6 °C)  HR:  [64-86] 73  BP: ()/(41-79) 127/57  Resp:  [14-21] 20  SpO2:  [75 %-99 %] 97 %  O2 Device: None (Room air)    Physical Exam  Vitals reviewed.   Cardiovascular:      Rate and Rhythm: Normal rate.   Pulmonary:      Effort: Pulmonary effort is normal. No respiratory distress.   Chest:      Chest wall: Tenderness present.   Musculoskeletal:      Cervical back: Normal range of motion.   Neurological:      Mental Status: She is alert.      GCS: GCS eye subscore is 4. GCS verbal subscore is 5. GCS motor subscore is 6.   Psychiatric:         Mood and Affect: Mood normal.         Behavior: Behavior normal.          Lab Results: I have reviewed the following results:  Estimated Creatinine Clearance: 30.6 mL/min (by C-G formula based on SCr of 0.98 mg/dL).  Lab Results   Component Value Date    WBC 6.61 04/18/2025    HGB 12.8 04/18/2025    HCT 39.2 04/18/2025     04/18/2025         Component  Value Date/Time    K 4.0 04/18/2025 0914     04/18/2025 0914    CO2 27 04/18/2025 0914    BUN 31 (H) 04/18/2025 0914    CREATININE 0.98 04/18/2025 0914         Component Value Date/Time    CALCIUM 9.2 04/18/2025 0914    ALKPHOS 45 04/18/2025 0914    AST 29 04/18/2025 0914    ALT 18 04/18/2025 0914    TP 6.7 04/18/2025 0914    ALB 3.9 04/18/2025 0914       Imaging Results Review: I reviewed radiology reports from this admission including: CT abdomen/pelvis.  Other Study Results Review: No additional pertinent studies reviewed.

## 2025-04-18 NOTE — ASSESSMENT & PLAN NOTE
Per review, CT c/a/p scan showing increased stool in rectum.  Will order bowel regimen.  Colace 100 mg, twice daily  Senna 8.6 mg, nightly  MiraLAX daily PRN  Encourage ambulation, as tolerated.

## 2025-04-18 NOTE — ASSESSMENT & PLAN NOTE
Patient presented after mechanical fall off of a elliptical.  Patient now with worsening ambulatory dysfunction, likely due to to pain.  Will consult PT/OT.   Continue fall precautions.  Continue further management, as described above.

## 2025-04-18 NOTE — ASSESSMENT & PLAN NOTE
Multimodal analgesia:  Tylenol 975 mg every 8 hours scheduled  Lidoderm patch, 12 hours on/12 hours off  Oxycodone 2.5 mg every 6 hours as needed for moderate pain  Oxycodone 5 mg every 6 hours as needed for severe pain  IV Dilaudid 0.2 mg every 4 hours as needed for breakthrough pain  Narcan as needed for respiratory depression/opioid reversal    Closely monitor for oversedation/respiratory depression following administration of opioid pain analgesics.  If pain remains well-controlled on oral regimen recommend discontinuation of IV opioids within the next 24 hours.

## 2025-04-18 NOTE — CASE MANAGEMENT
Case Management Assessment & Discharge Planning Note    Patient name Aide GRIMALDO Joaquín  Location /-01 MRN [id number]  : 1934 Date 2025       Current Admission Date: 2025  Current Admission Diagnosis:Closed fracture of multiple ribs of right side   Patient Active Problem List    Diagnosis Date Noted Date Diagnosed    Closed fracture of multiple ribs of right side 2025     Constipation 2025     Ambulatory dysfunction 2025     Acute pain due to trauma 2025     Chronic kidney disease, stage 3a (HCC) 2024     Acquired hypothyroidism 2024     Syncope 2024     CHICAS (dyspnea on exertion) 2024     Cervical spondylosis 2023     Full thickness rotator cuff tear 2023     Localized, primary osteoarthritis of shoulder region 2023       LOS (days): 0  Geometric Mean LOS (GMLOS) (days): 2.2  Days to GMLOS:2     OBJECTIVE:              Current admission status: Inpatient       [address]   [address] [address]  Phone: [phone] Fax: [phone]    Primary Care Provider: MAXINE Worthy    Primary Insurance: BLUE CROSS [employer] REP  Secondary Insurance:     ASSESSMENT:  Active Health Care Proxies       Ashlie Villafuerte Health Care Representative - Daughter   Primary Phone: [phone] (Mobile)                 Advance Directives  Does patient have a Health Care POA?: Yes  Does patient have Advance Directives?: Yes  Advance Directives: Power of  for health care  Primary Contact: Asiya (son)         Readmission Root Cause  30 Day Readmission: No    Patient Information  Admitted from:: Home  Mental Status: Alert  During Assessment patient was accompanied by: Not accompanied during assessment  Assessment information provided by:: Patient  Primary Caregiver: Self  Support Systems: Daughter, Son, Family members  Home entry access options.  Select all that apply.: Stairs  Number of steps to enter home.: 5  Type of Current Residence: 2 story home  Upon entering residence, is there a bedroom on the main floor (no further steps)?: No  A bedroom is located on the following floor levels of residence (select all that apply):: 2nd Floor  Upon entering residence, is there a bathroom on the main floor (no further steps)?: No  Indicate which floors of current residence have a bathroom (select all the apply):: 2nd Floor  Number of steps to 2nd floor from main floor: One Flight  Living Arrangements: Lives Alone    Activities of Daily Living Prior to Admission  Functional Status: Independent  Completes ADLs independently?: Yes  Ambulates independently?: Yes  Does patient use assisted devices?: No  Does patient currently own DME?: No  Does patient have a history of Outpatient Therapy (PT/OT)?: No  Does the patient have a history of Short-Term Rehab?: No  Does patient have a history of HHC?: Yes (unsure of agency)  Does patient currently have HHC?: No    Patient Information Continued  Does patient have prescription coverage?: Yes  Can the patient afford their medications and any related supplies (such as glucometers or test strips)?: Yes  Does patient receive dialysis treatments?: No  Does patient have a history of substance abuse?: No  Does patient have a history of Mental Health Diagnosis?: No      DISCHARGE DETAILS:    Discharge planning discussed with:: patient  Freedom of Choice: Yes  Comments - Freedom of Choice: discussed therapies recommendation for rehab; pt is requesting [facility].  CM contacted family/caregiver?: No- see comments (reports being in contact with family)  Were Treatment Team discharge recommendations reviewed with patient/caregiver?: Yes  Did patient/caregiver verbalize understanding of patient care needs?: Yes  Were patient/caregiver advised of the risks associated with not following Treatment Team discharge recommendations?:  Yes    Requested Home Health Care         Is the patient interested in HHC at discharge?: No    DME Referral Provided  Referral made for DME?: No    Other Referral/Resources/Interventions Provided:  Interventions: Short Term Rehab  Referral Comments: Referral to SNF's    Treatment Team Recommendation: Short Term Rehab  Discharge Destination Plan:: Short Term Rehab  Transport at Discharge : Wheelchair van     Additional Comments: Met with pt to discuss the role of CM and to discuss any help pt may need prior to dc. Pt lives alone in a 2 story home with 5 MARTITA. Pt performed ADL's indptly pta, no use of DME. Hx of HHC but unsure of agency. No hx of mental health or D&A treatment. Pt's preferred pharmacy is [facility] in [place]. CM discussed therapies recommendation for rehab; pt is agreeable and prefers [facility]. [facility] referrals sent. [facility] accepted. Task sent to CM DC support to request authorization.

## 2025-04-19 LAB
ANION GAP SERPL CALCULATED.3IONS-SCNC: 6 MMOL/L (ref 4–13)
APTT PPP: 55 SECONDS (ref 23–34)
BUN SERPL-MCNC: 24 MG/DL (ref 5–25)
CALCIUM SERPL-MCNC: 8.6 MG/DL (ref 8.4–10.2)
CHLORIDE SERPL-SCNC: 106 MMOL/L (ref 96–108)
CO2 SERPL-SCNC: 26 MMOL/L (ref 21–32)
CREAT SERPL-MCNC: 0.8 MG/DL (ref 0.6–1.3)
ERYTHROCYTE [DISTWIDTH] IN BLOOD BY AUTOMATED COUNT: 13.3 % (ref 11.6–15.1)
GFR SERPL CREATININE-BSD FRML MDRD: 65 ML/MIN/1.73SQ M
GLUCOSE SERPL-MCNC: 91 MG/DL (ref 65–140)
HCT VFR BLD AUTO: 35.5 % (ref 34.8–46.1)
HGB BLD-MCNC: 11.7 G/DL (ref 11.5–15.4)
INR PPP: 1.08 (ref 0.85–1.19)
MCH RBC QN AUTO: 31.6 PG (ref 26.8–34.3)
MCHC RBC AUTO-ENTMCNC: 33 G/DL (ref 31.4–37.4)
MCV RBC AUTO: 96 FL (ref 82–98)
PLATELET # BLD AUTO: 143 THOUSANDS/UL (ref 149–390)
PMV BLD AUTO: 8.7 FL (ref 8.9–12.7)
POTASSIUM SERPL-SCNC: 3.5 MMOL/L (ref 3.5–5.3)
PROTHROMBIN TIME: 14.5 SECONDS (ref 12.3–15)
RBC # BLD AUTO: 3.7 MILLION/UL (ref 3.81–5.12)
SODIUM SERPL-SCNC: 138 MMOL/L (ref 135–147)
WBC # BLD AUTO: 5.19 THOUSAND/UL (ref 4.31–10.16)

## 2025-04-19 PROCEDURE — 85730 THROMBOPLASTIN TIME PARTIAL: CPT

## 2025-04-19 PROCEDURE — 85027 COMPLETE CBC AUTOMATED: CPT

## 2025-04-19 PROCEDURE — 80048 BASIC METABOLIC PNL TOTAL CA: CPT

## 2025-04-19 PROCEDURE — 85610 PROTHROMBIN TIME: CPT

## 2025-04-19 PROCEDURE — 99232 SBSQ HOSP IP/OBS MODERATE 35: CPT | Performed by: INTERNAL MEDICINE

## 2025-04-19 RX ADMIN — ACETAMINOPHEN 975 MG: 325 TABLET, FILM COATED ORAL at 21:02

## 2025-04-19 RX ADMIN — Medication 2.5 MG: at 08:13

## 2025-04-19 RX ADMIN — DOCUSATE SODIUM 100 MG: 100 CAPSULE, LIQUID FILLED ORAL at 08:13

## 2025-04-19 RX ADMIN — ACETAMINOPHEN 975 MG: 325 TABLET, FILM COATED ORAL at 13:59

## 2025-04-19 RX ADMIN — ACETAMINOPHEN 975 MG: 325 TABLET, FILM COATED ORAL at 06:02

## 2025-04-19 RX ADMIN — HEPARIN SODIUM 5000 UNITS: 5000 INJECTION INTRAVENOUS; SUBCUTANEOUS at 14:00

## 2025-04-19 RX ADMIN — POLYETHYLENE GLYCOL 3350 17 G: 17 POWDER, FOR SOLUTION ORAL at 12:41

## 2025-04-19 RX ADMIN — LEVOTHYROXINE SODIUM 25 MCG: 25 TABLET ORAL at 06:02

## 2025-04-19 RX ADMIN — HEPARIN SODIUM 5000 UNITS: 5000 INJECTION INTRAVENOUS; SUBCUTANEOUS at 21:02

## 2025-04-19 RX ADMIN — MULTIPLE VITAMINS W/ MINERALS TAB 1 TABLET: TAB ORAL at 08:13

## 2025-04-19 RX ADMIN — SENNOSIDES 8.6 MG: 8.6 TABLET, FILM COATED ORAL at 21:02

## 2025-04-19 RX ADMIN — LIDOCAINE 5% 1 PATCH: 700 PATCH TOPICAL at 08:14

## 2025-04-19 RX ADMIN — HEPARIN SODIUM 5000 UNITS: 5000 INJECTION INTRAVENOUS; SUBCUTANEOUS at 06:03

## 2025-04-19 NOTE — ASSESSMENT & PLAN NOTE
Lab Results   Component Value Date    EGFR 65 04/19/2025    EGFR 50 04/18/2025    EGFR 59 11/29/2024    CREATININE 0.80 04/19/2025    CREATININE 0.98 04/18/2025    CREATININE 0.86 11/29/2024     Per chart review, baseline creatinine 0.8-1.0.  Cr within baseline.   Continue to avoid and/or limit nephrotoxins.  Continue to monitor via daily BMP.

## 2025-04-19 NOTE — UTILIZATION REVIEW
"Initial Clinical Review    Admission: Date/Time/Statement:   Admission Orders (From admission, onward)       Ordered        04/18/25 1204  INPATIENT ADMISSION  Once                          Orders Placed This Encounter   Procedures    INPATIENT ADMISSION     Standing Status:   Standing     Number of Occurrences:   1     Level of Care:   Med Surg [16]     Estimated length of stay:   More than 2 Midnights     Certification:   I certify that inpatient services are medically necessary for this patient for a duration of greater than two midnights. See H&P and MD Progress Notes for additional information about the patient's course of treatment.     ED Arrival Information       Expected   -    Arrival   4/18/2025 08:50    Acuity   Emergent              Means of arrival   Walk-In    Escorted by   Family Member    Service   Hospitalist    Admission type   Emergency              Arrival complaint   FALL    head strike - no thinners  back/side pain             Chief Complaint   Patient presents with    Fall     Pt arrives with daughter and states \"I fell Wednesday. I was trying to move a peddle stool and fell forward on the side and hit my head on a cushion chair\" Denies any loc blood thinners. Pt was seen by . Pt is complaining of right abd pain.        Initial Presentation: 89 y.o. female to ED via walk-in from home   Present to ED s/p mechanical fall on 4/16.  The patient explaining that she was utilizing an elliptical device to strengthen her legs, where she became off balanced and sustained mechanical fall.  The patient explains that she did fall into a leather recliner, but experienced pain in her right chest wall.  The patient was seen at an urgent care, but had received pain medication and sent home. Unfortunately, the patient's pain continued to worsen and patient with ambulatory dysfunction, as she is incredibly limited due to pain.  The patient's family expressing that due to worsening pain and ambulatory " dysfunction, thought that it be best to be evaluated in ED for further recommendations.  Patient lives at home alone, is independent.   PMHX hypothyroidism, anxiety   Admitted to [facility] with DX: Closed fracture of multiple ribs of right side   on exam: tachypnea; hypotensive; pain to palpation on right lateral aspect of chest wall. Pain 8/10   CT c/a/p completed which showed fractures of 10th/11th ribs in the right posterior lateral rib cage.   PLAN: pain control (see below); I/S; PT/ OT eval - tx; CM consulted       Anticipated Length of Stay/Certification Statement: Patient will be admitted on an inpatient basis with an anticipated length of stay of greater than 2 midnights secondary to pain control, PT/OT evaluations, and pending rehabilitation placement.       Date: 4/19/25      Day 2   The patient expressing that she is feeling much better this morning, reporting that he pain is much more controlled compared to yesterday prior to admission. Pain 5/10 . The patient expressing that she is ready to go to rehab, but concerned that she has not had a bowel movement in multiple days. PT/ OT recome rehab  Plan: pain control (see below); I/S; CM following       Date: 4/20/25     Day 3: Has surpassed a 2nd midnight with active treatments and services. Require additional inpatient hospital stay due to pending insurance authorization and discharge rehabilitation facility.   The patient expressing that she is feeling good this morning, where she is noting limited amounts of pain in her ribs. The patient expressing that she is feeling better ambulating in the room with the staff. The patient expressed that she had two bowel movements yesterday, where she endorses less constipation.   Plan: pain control (see below); I/S; CM following      ED Treatment-Medication Administration from 04/18/2025 0850 to 04/18/2025 8799         Date/Time Order Dose Route Action     04/18/2025 8382 HYDROmorphone (DILAUDID) injection 0.5 mg 0.5 mg  Intravenous Given     04/18/2025 0916 methocarbamol (ROBAXIN) tablet 1,000 mg 1,000 mg Oral Given     04/18/2025 0916 lidocaine (LIDODERM) 5 % patch 1 patch 1 patch Topical Medication Applied     04/18/2025 0944 iohexol (OMNIPAQUE) 350 MG/ML injection (MULTI-DOSE) 100 mL 90 mL Intravenous Given            Scheduled Medications:  acetaminophen, 975 mg, Oral, Q8H TITI  docusate sodium, 100 mg, Oral, BID  heparin (porcine), 5,000 Units, Subcutaneous, Q8H TITI  levothyroxine, 25 mcg, Oral, Daily  lidocaine, 1 patch, Topical, Daily  multivitamin-minerals, 1 tablet, Oral, Daily  senna, 1 tablet, Oral, HS      Continuous IV Infusions: None       PRN Meds:  HYDROmorphone, 0.2 mg, Intravenous, Q4H PRN  naloxone, 0.04 mg, Intravenous, Q1MIN PRN  oxyCODONE, 2.5 mg, Oral, Q6H PRN: 4/18 x2; 4/19 x1   Or  oxyCODONE, 5 mg, Oral, Q6H PRN  polyethylene glycol, 17 g, Oral, Daily PRN      ED Triage Vitals [04/18/25 0858]   Temperature Pulse Respirations Blood Pressure SpO2 Pain Score   97.7 °F (36.5 °C) 77 20 109/55 98 % 8     Weight (last 2 days)       Date/Time Weight    04/18/25 1319 50.8 (111.99)            Vital Signs (last 3 days)       Date/Time Temp Pulse Resp BP MAP (mmHg) SpO2 O2 Device Patient Position - Orthostatic VS [place] Coma Scale Score Pain    04/20/25 1410 -- -- -- -- -- -- -- -- -- 3    04/20/25 12:28:20 -- -- -- 128/61 83 -- -- -- -- --    04/20/25 12:26:56 -- 75 -- 101/60 74 99 % -- -- -- --    04/20/25 0815 -- -- -- -- -- 94 % None (Room air) -- 15 No Pain    04/20/25 07:09:04 -- 69 -- 135/61 86 97 % -- -- -- --    04/19/25 1950 -- -- -- -- -- 94 % None (Room air) -- 15 3    04/19/25 15:00:42 97.7 °F (36.5 °C) 62 -- 134/66 89 99 % None (Room air) Lying -- --    04/19/25 1359 -- -- -- -- -- -- -- -- -- Med Not Given for Pain - for MAR use only    04/19/25 0830 -- -- -- -- -- 99 % None (Room air) -- 15 --    04/19/25 0813 -- -- -- -- -- -- -- -- -- 5    04/19/25 07:34:52 97.8 °F (36.6 °C) 73 19 100/51 92 96 % -- --  -- --    04/18/25 2209 -- -- -- -- -- -- -- -- -- 5 04/18/25 2208 -- -- -- -- -- -- -- -- -- 5 04/18/25 21:22:56 97.5 °F (36.4 °C) 66 -- 134/69 91 98 % None (Room air) Lying -- --    04/18/25 21:21:52 97.5 °F (36.4 °C) 68 -- 134/69 91 99 % -- -- -- --    04/18/25 2000 -- -- -- -- -- -- None (Room air) -- 15 4 04/18/25 1415 -- -- -- -- -- -- -- -- -- 8 04/18/25 14:12:29 -- 73 -- 127/57 80 97 % -- Sitting - Orthostatic VS -- --    04/18/25 1411 -- -- -- -- -- -- -- -- -- 8 04/18/25 14:10:03 -- 83 -- 94/45 61 98 % -- Sitting - Orthostatic VS -- --    04/18/25 14:08:41 -- -- -- 73/41 52 -- -- Standing - Orthostatic VS -- --    04/18/25 14:04:09 -- 76 -- 116/59 78 97 % -- Sitting - Orthostatic VS -- --    04/18/25 1403 97.9 °F (36.6 °C) 73 20 116/59 -- -- -- -- -- --    04/18/25 1401 -- -- -- -- -- -- -- -- -- 8 04/18/25 14:00:48 -- 73 -- 116/59 78 98 % -- -- -- --    04/18/25 1355 -- -- -- -- -- -- -- -- -- 8 04/18/25 1346 -- -- -- -- -- 94 % -- -- -- --    04/18/25 1345 -- 72 -- -- -- 75 % -- -- -- --    04/18/25 1330 -- -- -- -- -- -- -- -- 15 --    04/18/25 1319 -- -- -- -- -- -- -- -- -- 7    04/18/25 12:38:36 -- 86 -- 97/64 75 99 % -- -- -- --    04/18/25 1200 -- 67 20 115/68 -- 98 % -- -- 15 --    04/18/25 1130 -- 67 20 130/78 -- 98 % -- -- 15 --    04/18/25 1100 -- 64 14 125/72 -- 98 % -- -- 15 --    04/18/25 1030 -- 78 18 122/71 -- 98 % -- -- 15 --    04/18/25 1015 -- 72 16 142/79 -- 97 % -- -- 15 --    04/18/25 1000 -- 74 21 147/70 -- 98 % -- -- 15 --    04/18/25 0945 -- 78 18 120/68 -- 97 % -- -- 15 --    04/18/25 0930 -- 78 18 125/65 -- 98 % -- -- 15 7 04/18/25 0916 -- -- -- -- -- -- -- -- -- 7    04/18/25 0915 -- 66 20 160/74 -- 98 % -- -- -- --    04/18/25 0858 97.7 °F (36.5 °C) 77 20 109/55 -- 98 % None (Room air) Sitting -- 8              Pertinent Labs/Diagnostic Test Results:   Radiology:  TRAUMA - CT head wo contrast   Final Interpretation by Reyes Maria MD (04/18 1009)       No acute intracranial abnormality.                  Workstation performed: [id number]         TRAUMA - CT spine cervical wo contrast   Final Interpretation by Reyes Maria MD (04/18 1009)      No cervical spine fracture or traumatic malalignment.                  Workstation performed: [id number]         TRAUMA - CT chest abdomen pelvis w contrast   Final Interpretation by Reyes Maria MD (04/18 1027)      1. Nondisplaced fractures, right posterolateral 10th and 11th ribs. No pneumothorax. Trace right effusion.   2. Chronic compression deformity of T11   3. No acute traumatic injury in the abdomen or pelvis   4. Increase stool within the rectum      The study was marked in EPIC for immediate notification.                  Workstation performed: [id number]         XR Trauma chest portable   Final Interpretation by Airam Crow MD (04/18 0932)      No acute cardiopulmonary disease.            Workstation performed: [id number]           Cardiology:  ECG 12 lead   Final Result by Anya Garcia MD (04/18 7618)   Sinus rhythm with 1st degree A-V block   Non-specific intra-ventricular conduction block   Abnormal ECG   When compared with ECG of 29-Nov-2024 11:03,   Non-specific intra-ventricular conduction block has replaced Right bundle    branch block   Confirmed by Anya Garcia ([id number]) on 4/18/2025 1:48:15 PM           Results from last 7 days   Lab Units 04/20/25 0623 04/19/25  0553 04/18/25  0914   WBC Thousand/uL 4.82 5.19 6.61   HEMOGLOBIN g/dL 12.2 11.7 12.8   HEMATOCRIT % 38.1 35.5 39.2   PLATELETS Thousands/uL 140* 143* 149   TOTAL NEUT ABS Thousands/µL  --   --  4.24        Results from last 7 days   Lab Units 04/20/25 0623 04/19/25  0553 04/18/25  0914   SODIUM mmol/L 141 138 136   POTASSIUM mmol/L 3.8 3.5 4.0   CHLORIDE mmol/L 107 106 103   CO2 mmol/L 27 26 27   ANION GAP mmol/L 7 6 6   BUN mg/dL 17 24 31*   CREATININE mg/dL 0.80 0.80 0.98   EGFR ml/min/1.73sq m 65 65 50   CALCIUM  mg/dL 8.7 8.6 9.2     Results from last 7 days   Lab Units 04/18/25  0914   AST U/L 29   ALT U/L 18   ALK PHOS U/L 45   TOTAL PROTEIN g/dL 6.7   ALBUMIN g/dL 3.9   TOTAL BILIRUBIN mg/dL 0.67        Results from last 7 days   Lab Units 04/20/25  0623 04/19/25  0553 04/18/25  0914   GLUCOSE RANDOM mg/dL 90 91 94        Results from last 7 days   Lab Units 04/18/25  1142 04/18/25  0914   HS TNI 0HR ng/L  --  8   HS TNI 2HR ng/L 7  --    HSTNI D2 ng/L -1  --         Results from last 7 days   Lab Units 04/19/25  0553   PROTIME seconds 14.5   INR  1.08   PTT seconds 55*        Results from last 7 days   Lab Units 04/18/25  0914   LIPASE u/L 15        Results from last 7 days   Lab Units 04/18/25  1037   CLARITY UA  Clear   COLOR UA  Yellow   SPEC GRAV UA  <1.005*   PH UA  6.0   GLUCOSE UA mg/dl Negative   KETONES UA mg/dl Negative   BLOOD UA  Small*   PROTEIN UA mg/dl Negative   NITRITE UA  Negative   BILIRUBIN UA  Negative   UROBILINOGEN UA (BE) mg/dl <2.0   LEUKOCYTES UA  Negative   WBC UA /hpf None Seen   RBC UA /hpf 0-1   BACTERIA UA /hpf None Seen   EPITHELIAL CELLS WET PREP /hpf Occasional   MUCUS THREADS  None Seen        Past Medical History:   Diagnosis Date    Anxiety     Disease of thyroid gland      Present on Admission:   Chronic kidney disease, stage 3a (HCC)   Acquired hypothyroidism      Admitting Diagnosis: Back pain [M54.9]  Pleural effusion [J90]  Side pain [R10.9]  Multiple rib fractures [S22.49XA]  Age/Sex: 89 y.o. female    Network Utilization Review Department  ATTENTION: Please call with any questions or concerns to [phone] and carefully listen to the prompts so that you are directed to the right person. All voicemails are confidential.   For Discharge needs, contact Care Management DC Support Team at [phone] opt. [phone]  Send all requests for admission clinical reviews, approved or denied determinations and any other requests to dedicated fax number below belonging to the campus where the  patient is receiving treatment. List of dedicated fax numbers for the Facilities:  FACILITY NAME UR FAX NUMBER   ADMISSION DENIALS (Administrative/Medical Necessity) [phone]   DISCHARGE SUPPORT TEAM (NETWORK) [phone]   PARENT CHILD HEALTH (Maternity/NICU/Pediatrics) [phone]   [facility] [phone]   [facility] [phone]   [facility] [phone]   [facility] [phone]   [facility] [phone]   [facility] [phone]   [facility] [phone]   [facility] [phone]   [facility] [phone]   [facility] [phone]   [facility] [phone]   [facility] [phone]

## 2025-04-19 NOTE — ASSESSMENT & PLAN NOTE
"Patient presented to ED after worsening pain in right rib cage s/p mechanical fall on 4/16.  Initially, patient was seen in urgent care, where she was given IM pain injection.  The patient presented to the ED due to worsening pain and discomfort.   CT c/a/p (4/18): \"Nondisplaced fractures, right posterolateral 10th and 11th ribs. No pneumothorax. Trace right effusion. Chronic compression deformity of T11.\"     Continue pain regimen.    Tylenol 975, every 8 hours scheduled.  Oxycodone 2.5 mg, every 6 hours as needed for moderate pain.  Oxycodone 5 mg, every 6 hours as needed for severe pain.  Hydromorphone 0.2 mg, every 4 hours as needed for breakthrough pain.  Continue lidocaine patch, daily.   Continue rib fracture protocol.  Continue fall precautions.   Continue coughing and deep breathing.   Continue incentive spirometry.   " room air

## 2025-04-19 NOTE — PLAN OF CARE
Problem: Prexisting or High Potential for Compromised Skin Integrity  Goal: Skin integrity is maintained or improved  Description: INTERVENTIONS:- Identify patients at risk for skin breakdown- Assess and monitor skin integrity- Assess and monitor nutrition and hydration status- Monitor labs - Assess for incontinence - Turn and reposition patient- Assist with mobility/ambulation- Relieve pressure over bony prominences- Avoid friction and shearing- Provide appropriate hygiene as needed including keeping skin clean and dry- Evaluate need for skin moisturizer/barrier cream- Collaborate with interdisciplinary team - Patient/family teaching- Consider wound care consult   Outcome: Progressing     Problem: PAIN - ADULT  Goal: Verbalizes/displays adequate comfort level or baseline comfort level  Description: Interventions:- Encourage patient to monitor pain and request assistance- Assess pain using appropriate pain scale- Administer analgesics based on type and severity of pain and evaluate response- Implement non-pharmacological measures as appropriate and evaluate response- Consider cultural and social influences on pain and pain management- Notify physician/advanced practitioner if interventions unsuccessful or patient reports new pain  Outcome: Progressing     Problem: INFECTION - ADULT  Goal: Absence or prevention of progression during hospitalization  Description: INTERVENTIONS:- Assess and monitor for signs and symptoms of infection- Monitor lab/diagnostic results- Monitor all insertion sites, i.e. indwelling lines, tubes, and drains- Monitor endotracheal if appropriate and nasal secretions for changes in amount and color- [place] appropriate cooling/warming therapies per order- Administer medications as ordered- Instruct and encourage patient and family to use good hand hygiene technique- Identify and instruct in appropriate isolation precautions for identified infection/condition  Outcome: Progressing  Goal:  Absence of fever/infection during neutropenic period  Description: INTERVENTIONS:- Monitor WBC  Outcome: Progressing     Problem: SAFETY ADULT  Goal: Patient will remain free of falls  Description: INTERVENTIONS:- Educate patient/family on patient safety including physical limitations- Instruct patient to call for assistance with activity - Consult OT/PT to assist with strengthening/mobility - Keep Call bell within reach- Keep bed low and locked with side rails adjusted as appropriate- Keep care items and personal belongings within reach- Initiate and maintain comfort rounds- Make Fall Risk Sign visible to staff- Offer Toileting every 2 Hours, in advance of need- Initiate/Maintain bed alarm- Obtain necessary fall risk management equipment: yellow socks/RW- Apply yellow socks and bracelet for high fall risk patients- Consider moving patient to room near nurses station  Outcome: Progressing  Goal: Maintain or return to baseline ADL function  Description: INTERVENTIONS:-  Assess patient's ability to carry out ADLs; assess patient's baseline for ADL function and identify physical deficits which impact ability to perform ADLs (bathing, care of mouth/teeth, toileting, grooming, dressing, etc.)- Assess/evaluate cause of self-care deficits - Assess range of motion- Assess patient's mobility; develop plan if impaired- Assess patient's need for assistive devices and provide as appropriate- Encourage maximum independence but intervene and supervise when necessary- Involve family in performance of ADLs- Assess for home care needs following discharge - Consider OT consult to assist with ADL evaluation and planning for discharge- Provide patient education as appropriate  Outcome: Progressing  Goal: Maintains/Returns to pre admission functional level  Description: INTERVENTIONS:- Perform AM-PAC 6 Click Basic Mobility/ Daily Activity assessment daily.- Set and communicate daily mobility goal to care team and  patient/family/caregiver. - Collaborate with rehabilitation services on mobility goals if consulted- Perform Range of Motion 3 times a day.- Reposition patient every 3 hours.- Dangle patient 3 times a day- Stand patient 3 times a day- Ambulate patient 3 times a day- Out of bed to chair 3 times a day - Out of bed for meals 3 times a day- Out of bed for toileting- Record patient progress and toleration of activity level   Outcome: Progressing     Problem: DISCHARGE PLANNING  Goal: Discharge to home or other facility with appropriate resources  Description: INTERVENTIONS:- Identify barriers to discharge w/patient and caregiver- Arrange for needed discharge resources and transportation as appropriate- Identify discharge learning needs (meds, wound care, etc.)- Arrange for interpretive services to assist at discharge as needed- Refer to Case Management Department for coordinating discharge planning if the patient needs post-hospital services based on physician/advanced practitioner order or complex needs related to functional status, cognitive ability, or social support system  Outcome: Progressing     Problem: Knowledge Deficit  Goal: Patient/family/caregiver demonstrates understanding of disease process, treatment plan, medications, and discharge instructions  Description: Complete learning assessment and assess knowledge base.Interventions:- Provide teaching at level of understanding- Provide teaching via preferred learning methods  Outcome: Progressing

## 2025-04-19 NOTE — ASSESSMENT & PLAN NOTE
Patient presented after mechanical fall off of a elliptical.  Patient with ambulatory dysfunction, where patient feels she needs to get stronger.   PT/OT recommending Level II (moderate resource intensity).  Continue fall precautions.  Continue further management, as described above.

## 2025-04-19 NOTE — PROGRESS NOTES
"Progress Note - Hospitalist   Name: Aide Yanes 89 y.o. female I MRN: [id number]  Unit/Bed#: -01 I Date of Admission: 4/18/2025   Date of Service: 4/19/2025 I Hospital Day: 1    Assessment & Plan  Closed fracture of multiple ribs of right side  Patient presented to ED after worsening pain in right rib cage s/p mechanical fall on 4/16.  Initially, patient was seen in urgent care, where she was given IM pain injection.  The patient presented to the ED due to worsening pain and discomfort.   CT c/a/p (4/18): \"Nondisplaced fractures, right posterolateral 10th and 11th ribs. No pneumothorax. Trace right effusion. Chronic compression deformity of T11.\"     Continue pain regimen.    Tylenol 975, every 8 hours scheduled.  Oxycodone 2.5 mg, every 6 hours as needed for moderate pain.  Oxycodone 5 mg, every 6 hours as needed for severe pain.  Hydromorphone 0.2 mg, every 4 hours as needed for breakthrough pain.  Continue lidocaine patch, daily.   Continue rib fracture protocol.  Continue fall precautions.   Continue coughing and deep breathing.   Continue incentive spirometry.   Ambulatory dysfunction  Patient presented after mechanical fall off of a elliptical.  Patient with ambulatory dysfunction, where patient feels she needs to get stronger.   PT/OT recommending Level II (moderate resource intensity).  Continue fall precautions.  Continue further management, as described above.  Constipation  Per review, CT c/a/p scan showing increased stool in rectum.  Continue bowel regimen.  Colace 100 mg, twice daily  Senna 8.6 mg, nightly  MiraLAX daily PRN  Encourage ambulation, as tolerated.  Chronic kidney disease, stage 3a (HCC)  Lab Results   Component Value Date    EGFR 65 04/19/2025    EGFR 50 04/18/2025    EGFR 59 11/29/2024    CREATININE 0.80 04/19/2025    CREATININE 0.98 04/18/2025    CREATININE 0.86 11/29/2024     Per chart review, baseline creatinine 0.8-1.0.  Cr within baseline.   Continue to avoid and/or limit " nephrotoxins.  Continue to monitor via daily BMP.  Acquired hypothyroidism  Continue levothyroxine 25 mcg, daily.    VTE Pharmacologic Prophylaxis: VTE Score: 3 Moderate Risk (Score 3-4) - Pharmacological DVT Prophylaxis Ordered: heparin.    Mobility:   Basic Mobility Inpatient Raw Score: 15  JH-HLM Goal: 4: Move to chair/commode  JH-HLM Achieved: 4: Move to chair/commode  JH-HLM Goal achieved. Continue to encourage appropriate mobility.    Patient Centered Rounds: I performed bedside rounds with nursing staff today.   Discussions with Specialists or Other Care Team Provider: Case Management     Education and Discussions with Family / Patient: Patient declined call to .     Current Length of Stay: 1 day(s)  Current Patient Status: Inpatient   Certification Statement: The patient will continue to require additional inpatient hospital stay due to constipation and continued pain control.  Discharge Plan: Anticipate discharge in 24-48 hrs to rehab facility.    Code Status: Level 3 - DNAR and DNI    Subjective   The patient was seen and examined at the bedside this morning. The patient was sitting up on the edge of the bed and eating her lunch. The patient expressing that she is feeling much better this morning, reporting that he pain is much more controlled compared to yesterday prior to admission. The patient expressing that she is ready to go to rehab, but concerned that she has not had a bowel movement in multiple days. The patient expressed that she did not have a bowel movement for about 3-4 days prior to admission. Usually, the patient utilizes MiraLAX at home, but has not recently.  The patient does not endorse abdominal pain, discomfort, nausea, vomiting or, or distention.    Per nursing, no acute events overnight.    Objective :  Temp:  [97.5 °F (36.4 °C)-97.9 °F (36.6 °C)] 97.8 °F (36.6 °C)  HR:  [66-83] 73  BP: ()/(41-69) 100/51  Resp:  [19-20] 19  SpO2:  [75 %-99 %] 99 %  O2 Device: None  (Room air)    Body mass index is 19.22 kg/m².     Input and Output Summary (last 24 hours):     Intake/Output Summary (Last 24 hours) at 4/19/2025 1340  Last data filed at 4/19/2025 1231  Gross per 24 hour   Intake 420 ml   Output --   Net 420 ml       Physical Exam  Vitals and nursing note reviewed.   Constitutional:       General: She is awake. She is not in acute distress.     Appearance: Normal appearance. She is not ill-appearing.   HENT:      Head: Normocephalic and atraumatic.   Eyes:      General: No scleral icterus.     Conjunctiva/sclera: Conjunctivae normal.      Comments: On exam, patient wears glasses.   Cardiovascular:      Rate and Rhythm: Normal rate and regular rhythm.      Heart sounds: Normal heart sounds, S1 normal and S2 normal. No murmur heard.  Pulmonary:      Effort: Pulmonary effort is normal. No respiratory distress.      Breath sounds: Normal breath sounds. No wheezing, rhonchi or rales.   Chest:      Chest wall: Tenderness (R rib cage) present.      Comments: On exam, patient with pain to palpation on right lateral aspect of chest wall.   Abdominal:      General: Bowel sounds are normal. There is no distension.      Tenderness: There is no abdominal tenderness.   Musculoskeletal:      Cervical back: Normal range of motion.      Right lower leg: No edema.      Left lower leg: No edema.   Skin:     General: Skin is warm and dry.      Comments: On exam, no evidence of open wounds/lesions on exposed skin.   Neurological:      Mental Status: She is alert and oriented to person, place, and time.      Sensory: Sensation is intact.      Motor: Weakness (generalized) present.   Psychiatric:         Attention and Perception: Attention normal.         Mood and Affect: Mood normal.         Speech: Speech normal.         Behavior: Behavior is cooperative.       Lines/Drains:        Lab Results: I have reviewed the following results:   Results from last 7 days   Lab Units 04/19/25  0553 04/18/25  0914    WBC Thousand/uL 5.19 6.61   HEMOGLOBIN g/dL 11.7 12.8   HEMATOCRIT % 35.5 39.2   PLATELETS Thousands/uL 143* 149   SEGS PCT %  --  63   LYMPHO PCT %  --  17   MONO PCT %  --  18*   EOS PCT %  --  1     Results from last 7 days   Lab Units 04/19/25  0553 04/18/25  0914   SODIUM mmol/L 138 136   POTASSIUM mmol/L 3.5 4.0   CHLORIDE mmol/L 106 103   CO2 mmol/L 26 27   BUN mg/dL 24 31*   CREATININE mg/dL 0.80 0.98   ANION GAP mmol/L 6 6   CALCIUM mg/dL 8.6 9.2   ALBUMIN g/dL  --  3.9   TOTAL BILIRUBIN mg/dL  --  0.67   ALK PHOS U/L  --  45   ALT U/L  --  18   AST U/L  --  29   GLUCOSE RANDOM mg/dL 91 94     Results from last 7 days   Lab Units 04/19/25  0553   INR  1.08                   Recent Cultures (last 7 days):               Last 24 Hours Medication List:     Current Facility-Administered Medications:     acetaminophen (TYLENOL) tablet 975 mg, Q8H TITI    docusate sodium (COLACE) capsule 100 mg, BID    heparin (porcine) subcutaneous injection 5,000 Units, Q8H TITI    HYDROmorphone HCl (DILAUDID) injection 0.2 mg, Q4H PRN    levothyroxine tablet 25 mcg, Daily    lidocaine (LIDODERM) 5 % patch 1 patch, Daily    multivitamin-minerals (CENTRUM) tablet 1 tablet, Daily    naloxone (NARCAN) 0.04 mg/mL syringe 0.04 mg, Q1MIN PRN    oxyCODONE (ROXICODONE) split tablet 2.5 mg, Q6H PRN **OR** oxyCODONE (ROXICODONE) IR tablet 5 mg, Q6H PRN    polyethylene glycol (MIRALAX) packet 17 g, Daily PRN    senna (SENOKOT) tablet 8.6 mg, HS    Administrative Statements   Today, Patient Was Seen By: MAXINE Sosa      **Please Note: This note may have been constructed using a voice recognition system.**

## 2025-04-19 NOTE — ASSESSMENT & PLAN NOTE
Per review, CT c/a/p scan showing increased stool in rectum.  Continue bowel regimen.  Colace 100 mg, twice daily  Senna 8.6 mg, nightly  MiraLAX daily PRN  Encourage ambulation, as tolerated.

## 2025-04-19 NOTE — PLAN OF CARE
Problem: Prexisting or High Potential for Compromised Skin Integrity  Goal: Skin integrity is maintained or improved  Description: INTERVENTIONS:- Identify patients at risk for skin breakdown- Assess and monitor skin integrity- Assess and monitor nutrition and hydration status- Monitor labs - Assess for incontinence - Turn and reposition patient- Assist with mobility/ambulation- Relieve pressure over bony prominences- Avoid friction and shearing- Provide appropriate hygiene as needed including keeping skin clean and dry- Evaluate need for skin moisturizer/barrier cream- Collaborate with interdisciplinary team - Patient/family teaching- Consider wound care consult   Outcome: Progressing     Problem: PAIN - ADULT  Goal: Verbalizes/displays adequate comfort level or baseline comfort level  Description: Interventions:- Encourage patient to monitor pain and request assistance- Assess pain using appropriate pain scale- Administer analgesics based on type and severity of pain and evaluate response- Implement non-pharmacological measures as appropriate and evaluate response- Consider cultural and social influences on pain and pain management- Notify physician/advanced practitioner if interventions unsuccessful or patient reports new pain  Outcome: Progressing

## 2025-04-20 LAB
ANION GAP SERPL CALCULATED.3IONS-SCNC: 7 MMOL/L (ref 4–13)
BUN SERPL-MCNC: 17 MG/DL (ref 5–25)
CALCIUM SERPL-MCNC: 8.7 MG/DL (ref 8.4–10.2)
CHLORIDE SERPL-SCNC: 107 MMOL/L (ref 96–108)
CO2 SERPL-SCNC: 27 MMOL/L (ref 21–32)
CREAT SERPL-MCNC: 0.8 MG/DL (ref 0.6–1.3)
ERYTHROCYTE [DISTWIDTH] IN BLOOD BY AUTOMATED COUNT: 13 % (ref 11.6–15.1)
GFR SERPL CREATININE-BSD FRML MDRD: 65 ML/MIN/1.73SQ M
GLUCOSE SERPL-MCNC: 90 MG/DL (ref 65–140)
HCT VFR BLD AUTO: 38.1 % (ref 34.8–46.1)
HGB BLD-MCNC: 12.2 G/DL (ref 11.5–15.4)
MCH RBC QN AUTO: 30.7 PG (ref 26.8–34.3)
MCHC RBC AUTO-ENTMCNC: 32 G/DL (ref 31.4–37.4)
MCV RBC AUTO: 96 FL (ref 82–98)
PLATELET # BLD AUTO: 140 THOUSANDS/UL (ref 149–390)
PMV BLD AUTO: 8.3 FL (ref 8.9–12.7)
POTASSIUM SERPL-SCNC: 3.8 MMOL/L (ref 3.5–5.3)
RBC # BLD AUTO: 3.98 MILLION/UL (ref 3.81–5.12)
SODIUM SERPL-SCNC: 141 MMOL/L (ref 135–147)
WBC # BLD AUTO: 4.82 THOUSAND/UL (ref 4.31–10.16)

## 2025-04-20 PROCEDURE — 85027 COMPLETE CBC AUTOMATED: CPT

## 2025-04-20 PROCEDURE — 80048 BASIC METABOLIC PNL TOTAL CA: CPT

## 2025-04-20 PROCEDURE — 99232 SBSQ HOSP IP/OBS MODERATE 35: CPT | Performed by: INTERNAL MEDICINE

## 2025-04-20 RX ADMIN — MULTIPLE VITAMINS W/ MINERALS TAB 1 TABLET: TAB ORAL at 08:12

## 2025-04-20 RX ADMIN — HEPARIN SODIUM 5000 UNITS: 5000 INJECTION INTRAVENOUS; SUBCUTANEOUS at 14:10

## 2025-04-20 RX ADMIN — DOCUSATE SODIUM 100 MG: 100 CAPSULE, LIQUID FILLED ORAL at 17:12

## 2025-04-20 RX ADMIN — HEPARIN SODIUM 5000 UNITS: 5000 INJECTION INTRAVENOUS; SUBCUTANEOUS at 06:03

## 2025-04-20 RX ADMIN — DOCUSATE SODIUM 100 MG: 100 CAPSULE, LIQUID FILLED ORAL at 08:12

## 2025-04-20 RX ADMIN — ACETAMINOPHEN 975 MG: 325 TABLET, FILM COATED ORAL at 14:10

## 2025-04-20 RX ADMIN — ACETAMINOPHEN 975 MG: 325 TABLET, FILM COATED ORAL at 06:03

## 2025-04-20 RX ADMIN — LEVOTHYROXINE SODIUM 25 MCG: 25 TABLET ORAL at 06:03

## 2025-04-20 RX ADMIN — ACETAMINOPHEN 975 MG: 325 TABLET, FILM COATED ORAL at 21:12

## 2025-04-20 RX ADMIN — HEPARIN SODIUM 5000 UNITS: 5000 INJECTION INTRAVENOUS; SUBCUTANEOUS at 21:12

## 2025-04-20 RX ADMIN — SENNOSIDES 8.6 MG: 8.6 TABLET, FILM COATED ORAL at 21:12

## 2025-04-20 RX ADMIN — LIDOCAINE 5% 1 PATCH: 700 PATCH TOPICAL at 08:12

## 2025-04-20 NOTE — ASSESSMENT & PLAN NOTE
Per review, CT c/a/p scan showing increased stool in rectum.  Continue bowel regimen.  Colace 100 mg, twice daily  Senna 8.6 mg, nightly  MiraLAX daily PRN  Encourage ambulation, as tolerated.  Per review, patient now having bowel movements and feeling less constipated.

## 2025-04-20 NOTE — DISCHARGE SUPPORT
Sultana from [facility] called and stated that the SOC was approved for today 4/20 and the NRD is 4/24 and to call P:[phone] with updates.Ref # [id number]   notified: Dorian ATKINSON

## 2025-04-20 NOTE — ASSESSMENT & PLAN NOTE
Lab Results   Component Value Date    EGFR 65 04/20/2025    EGFR 65 04/19/2025    EGFR 50 04/18/2025    CREATININE 0.80 04/20/2025    CREATININE 0.80 04/19/2025    CREATININE 0.98 04/18/2025     Per chart review, baseline creatinine 0.8-1.0.  Cr within baseline.   Continue to avoid and/or limit nephrotoxins.  Continue to monitor via daily BMP.

## 2025-04-20 NOTE — DISCHARGE SUPPORT
Per CM, pt received an approval on 4/18 for [facility] but could not go. CM states that pt may be able to go today and to see if we can change SOC or if we need to new OT/PT notes to resubmit. Called [facility] [phone] and submitted message to Licha for call back.  CM aware: Dorian ATKINSON

## 2025-04-20 NOTE — PLAN OF CARE
Problem: Prexisting or High Potential for Compromised Skin Integrity  Goal: Skin integrity is maintained or improved  Description: INTERVENTIONS:- Identify patients at risk for skin breakdown- Assess and monitor skin integrity- Assess and monitor nutrition and hydration status- Monitor labs - Assess for incontinence - Turn and reposition patient- Assist with mobility/ambulation- Relieve pressure over bony prominences- Avoid friction and shearing- Provide appropriate hygiene as needed including keeping skin clean and dry- Evaluate need for skin moisturizer/barrier cream- Collaborate with interdisciplinary team - Patient/family teaching- Consider wound care consult   Outcome: Progressing     Problem: PAIN - ADULT  Goal: Verbalizes/displays adequate comfort level or baseline comfort level  Description: Interventions:- Encourage patient to monitor pain and request assistance- Assess pain using appropriate pain scale- Administer analgesics based on type and severity of pain and evaluate response- Implement non-pharmacological measures as appropriate and evaluate response- Consider cultural and social influences on pain and pain management- Notify physician/advanced practitioner if interventions unsuccessful or patient reports new pain  Outcome: Progressing     Problem: INFECTION - ADULT  Goal: Absence or prevention of progression during hospitalization  Description: INTERVENTIONS:- Assess and monitor for signs and symptoms of infection- Monitor lab/diagnostic results- Monitor all insertion sites, i.e. indwelling lines, tubes, and drains- Monitor endotracheal if appropriate and nasal secretions for changes in amount and color- [place] appropriate cooling/warming therapies per order- Administer medications as ordered- Instruct and encourage patient and family to use good hand hygiene technique- Identify and instruct in appropriate isolation precautions for identified infection/condition  Outcome: Progressing  Goal:  Absence of fever/infection during neutropenic period  Description: INTERVENTIONS:- Monitor WBC  Outcome: Progressing     Problem: SAFETY ADULT  Goal: Patient will remain free of falls  Description: INTERVENTIONS:- Educate patient/family on patient safety including physical limitations- Instruct patient to call for assistance with activity - Consult OT/PT to assist with strengthening/mobility - Keep Call bell within reach- Keep bed low and locked with side rails adjusted as appropriate- Keep care items and personal belongings within reach- Initiate and maintain comfort rounds- Make Fall Risk Sign visible to staff- Offer Toileting every x Hours, in advance of need- Initiate/Maintain xalarm- Obtain necessary fall risk management equipment: x- Apply yellow socks and bracelet for high fall risk patients- Consider moving patient to room near nurses station  Outcome: Progressing  Goal: Maintain or return to baseline ADL function  Description: INTERVENTIONS:-  Assess patient's ability to carry out ADLs; assess patient's baseline for ADL function and identify physical deficits which impact ability to perform ADLs (bathing, care of mouth/teeth, toileting, grooming, dressing, etc.)- Assess/evaluate cause of self-care deficits - Assess range of motion- Assess patient's mobility; develop plan if impaired- Assess patient's need for assistive devices and provide as appropriate- Encourage maximum independence but intervene and supervise when necessary- Involve family in performance of ADLs- Assess for home care needs following discharge - Consider OT consult to assist with ADL evaluation and planning for discharge- Provide patient education as appropriate  Outcome: Progressing  Goal: Maintains/Returns to pre admission functional level  Description: INTERVENTIONS:- Perform AM-PAC 6 Click Basic Mobility/ Daily Activity assessment daily.- Set and communicate daily mobility goal to care team and patient/family/caregiver. - Collaborate  with rehabilitation services on mobility goals if consulted- Perform Range of Motion x times a day.- Reposition patient every x hours.- Dangle patient x times a day- Stand patient x times a day- Ambulate patient x times a day- Out of bed to chair x times a day - Out of bed for meals x times a day- Out of bed for toileting- Record patient progress and toleration of activity level   Outcome: Progressing     Problem: DISCHARGE PLANNING  Goal: Discharge to home or other facility with appropriate resources  Description: INTERVENTIONS:- Identify barriers to discharge w/patient and caregiver- Arrange for needed discharge resources and transportation as appropriate- Identify discharge learning needs (meds, wound care, etc.)- Arrange for interpretive services to assist at discharge as needed- Refer to Case Management Department for coordinating discharge planning if the patient needs post-hospital services based on physician/advanced practitioner order or complex needs related to functional status, cognitive ability, or social support system  Outcome: Progressing     Problem: Knowledge Deficit  Goal: Patient/family/caregiver demonstrates understanding of disease process, treatment plan, medications, and discharge instructions  Description: Complete learning assessment and assess knowledge base.Interventions:- Provide teaching at level of understanding- Provide teaching via preferred learning methods  Outcome: Progressing

## 2025-04-20 NOTE — ASSESSMENT & PLAN NOTE
"Patient presented to ED after worsening pain in right rib cage s/p mechanical fall on 4/16.  Initially, patient was seen in urgent care, where she was given IM pain injection.  The patient presented to the ED due to worsening pain and discomfort.   CT c/a/p (4/18): \"Nondisplaced fractures, right posterolateral 10th and 11th ribs. No pneumothorax. Trace right effusion. Chronic compression deformity of T11.\"     Continue pain regimen, as appears pain is well-controlled.   Tylenol 975, every 8 hours scheduled.  Oxycodone 2.5 mg, every 6 hours as needed for moderate pain.  Oxycodone 5 mg, every 6 hours as needed for severe pain.  Hydromorphone 0.2 mg, every 4 hours as needed for breakthrough pain.  Continue lidocaine patch, daily.   Continue rib fracture protocol.  Continue fall precautions.   Continue coughing and deep breathing.   Continue incentive spirometry.   "

## 2025-04-20 NOTE — PROGRESS NOTES
"Progress Note - Hospitalist   Name: Aide Yanes 89 y.o. female I MRN: [id number]  Unit/Bed#: -01 I Date of Admission: 4/18/2025   Date of Service: 4/20/2025 I Hospital Day: 2    Assessment & Plan  Closed fracture of multiple ribs of right side  Patient presented to ED after worsening pain in right rib cage s/p mechanical fall on 4/16.  Initially, patient was seen in urgent care, where she was given IM pain injection.  The patient presented to the ED due to worsening pain and discomfort.   CT c/a/p (4/18): \"Nondisplaced fractures, right posterolateral 10th and 11th ribs. No pneumothorax. Trace right effusion. Chronic compression deformity of T11.\"     Continue pain regimen, as appears pain is well-controlled.   Tylenol 975, every 8 hours scheduled.  Oxycodone 2.5 mg, every 6 hours as needed for moderate pain.  Oxycodone 5 mg, every 6 hours as needed for severe pain.  Hydromorphone 0.2 mg, every 4 hours as needed for breakthrough pain.  Continue lidocaine patch, daily.   Continue rib fracture protocol.  Continue fall precautions.   Continue coughing and deep breathing.   Continue incentive spirometry.   Ambulatory dysfunction  Patient presented after mechanical fall off of a elliptical.  Patient with ambulatory dysfunction, where patient feels she needs to get stronger.   PT/OT recommending Level II (moderate resource intensity).  Continue fall precautions.  Continue further management, as described above.  Constipation  Per review, CT c/a/p scan showing increased stool in rectum.  Continue bowel regimen.  Colace 100 mg, twice daily  Senna 8.6 mg, nightly  MiraLAX daily PRN  Encourage ambulation, as tolerated.  Per review, patient now having bowel movements and feeling less constipated.   Chronic kidney disease, stage 3a (HCC)  Lab Results   Component Value Date    EGFR 65 04/20/2025    EGFR 65 04/19/2025    EGFR 50 04/18/2025    CREATININE 0.80 04/20/2025    CREATININE 0.80 04/19/2025    CREATININE 0.98 " 04/18/2025     Per chart review, baseline creatinine 0.8-1.0.  Cr within baseline.   Continue to avoid and/or limit nephrotoxins.  Continue to monitor via daily BMP.  Acquired hypothyroidism  Continue levothyroxine 25 mcg, daily.    VTE Pharmacologic Prophylaxis: VTE Score: 3 Moderate Risk (Score 3-4) - Pharmacological DVT Prophylaxis Ordered: heparin.    Mobility:   Basic Mobility Inpatient Raw Score: 19  JH-HLM Goal: 6: Walk 10 steps or more  JH-HLM Achieved: 6: Walk 10 steps or more  JH-HLM Goal achieved. Continue to encourage appropriate mobility.    Patient Centered Rounds: I performed bedside rounds with nursing staff today.   Discussions with Specialists or Other Care Team Provider: Case Management     Education and Discussions with Family / Patient: Updated  (daughter) via phone.    Current Length of Stay: 2 day(s)  Current Patient Status: Inpatient   Certification Statement: The patient will continue to require additional inpatient hospital stay due to pending insurance authorization and discharge rehabilitation facility.  Discharge Plan: Anticipate discharge tomorrow to rehab facility.    Code Status: Level 3 - DNAR and DNI    Subjective   The patient was seen and examined at the bedside this morning. The patient expressing that she is feeling good this morning, where she is noting limited amounts of pain in her ribs. The patient expressing that she is feeling better ambulating in the room with the staff. The patient expressing that the food is not the best, but in good spirits otherwise. The patient expressed that she had two bowel movements yesterday, where she endorses less constipation.     Per nursing, no acute events overnight.     Objective :  Temp:  [97.7 °F (36.5 °C)] 97.7 °F (36.5 °C)  HR:  [62-75] 75  BP: (101-135)/(60-66) 128/61  SpO2:  [94 %-99 %] 99 %  O2 Device: None (Room air)    Body mass index is 19.22 kg/m².     Input and Output Summary (last 24 hours):     Intake/Output  Summary (Last 24 hours) at 4/20/2025 1422  Last data filed at 4/20/2025 0817  Gross per 24 hour   Intake 1040 ml   Output 800 ml   Net 240 ml       Physical Exam  Vitals and nursing note reviewed.   Constitutional:       General: She is awake. She is not in acute distress.     Appearance: Normal appearance. She is not ill-appearing.   HENT:      Head: Normocephalic and atraumatic.   Eyes:      General: No scleral icterus.     Conjunctiva/sclera: Conjunctivae normal.      Comments: On exam, patient wears glasses.   Cardiovascular:      Rate and Rhythm: Normal rate and regular rhythm.      Heart sounds: Normal heart sounds, S1 normal and S2 normal. No murmur heard.  Pulmonary:      Effort: Pulmonary effort is normal. No respiratory distress.      Breath sounds: Normal breath sounds. No wheezing, rhonchi or rales.   Chest:      Chest wall: Tenderness (R rib cage) present.   Abdominal:      General: Bowel sounds are normal. There is no distension.      Tenderness: There is no abdominal tenderness.   Musculoskeletal:      Cervical back: Normal range of motion.      Right lower leg: No edema.      Left lower leg: No edema.   Skin:     General: Skin is warm and dry.      Comments: On exam, no evidence of open wounds/lesions on exposed skin.   Neurological:      Mental Status: She is alert and oriented to person, place, and time.      Sensory: Sensation is intact.      Motor: Weakness (generalized) present.   Psychiatric:         Attention and Perception: Attention normal.         Mood and Affect: Mood normal.         Speech: Speech normal.         Behavior: Behavior is cooperative.       Lines/Drains:      Lab Results: I have reviewed the following results:   Results from last 7 days   Lab Units 04/20/25  0623 04/19/25  0553 04/18/25  0914   WBC Thousand/uL 4.82   < > 6.61   HEMOGLOBIN g/dL 12.2   < > 12.8   HEMATOCRIT % 38.1   < > 39.2   PLATELETS Thousands/uL 140*   < > 149   SEGS PCT %  --   --  63   LYMPHO PCT %  --    --  17   MONO PCT %  --   --  18*   EOS PCT %  --   --  1    < > = values in this interval not displayed.     Results from last 7 days   Lab Units 04/20/25  0623 04/19/25  0553 04/18/25  0914   SODIUM mmol/L 141   < > 136   POTASSIUM mmol/L 3.8   < > 4.0   CHLORIDE mmol/L 107   < > 103   CO2 mmol/L 27   < > 27   BUN mg/dL 17   < > 31*   CREATININE mg/dL 0.80   < > 0.98   ANION GAP mmol/L 7   < > 6   CALCIUM mg/dL 8.7   < > 9.2   ALBUMIN g/dL  --   --  3.9   TOTAL BILIRUBIN mg/dL  --   --  0.67   ALK PHOS U/L  --   --  45   ALT U/L  --   --  18   AST U/L  --   --  29   GLUCOSE RANDOM mg/dL 90   < > 94    < > = values in this interval not displayed.     Results from last 7 days   Lab Units 04/19/25  0553   INR  1.08                   Recent Cultures (last 7 days):               Last 24 Hours Medication List:     Current Facility-Administered Medications:     acetaminophen (TYLENOL) tablet 975 mg, Q8H TITI    docusate sodium (COLACE) capsule 100 mg, BID    heparin (porcine) subcutaneous injection 5,000 Units, Q8H TITI    HYDROmorphone HCl (DILAUDID) injection 0.2 mg, Q4H PRN    levothyroxine tablet 25 mcg, Daily    lidocaine (LIDODERM) 5 % patch 1 patch, Daily    multivitamin-minerals (CENTRUM) tablet 1 tablet, Daily    naloxone (NARCAN) 0.04 mg/mL syringe 0.04 mg, Q1MIN PRN    oxyCODONE (ROXICODONE) split tablet 2.5 mg, Q6H PRN **OR** oxyCODONE (ROXICODONE) IR tablet 5 mg, Q6H PRN    polyethylene glycol (MIRALAX) packet 17 g, Daily PRN    senna (SENOKOT) tablet 8.6 mg, HS    Administrative Statements   Today, Patient Was Seen By: MAXINE Sosa      **Please Note: This note may have been constructed using a voice recognition system.**

## 2025-04-20 NOTE — PLAN OF CARE
Problem: Prexisting or High Potential for Compromised Skin Integrity  Goal: Skin integrity is maintained or improved  Description: INTERVENTIONS:- Identify patients at risk for skin breakdown- Assess and monitor skin integrity- Assess and monitor nutrition and hydration status- Monitor labs - Assess for incontinence - Turn and reposition patient- Assist with mobility/ambulation- Relieve pressure over bony prominences- Avoid friction and shearing- Provide appropriate hygiene as needed including keeping skin clean and dry- Evaluate need for skin moisturizer/barrier cream- Collaborate with interdisciplinary team - Patient/family teaching- Consider wound care consult   Outcome: Progressing     Problem: PAIN - ADULT  Goal: Verbalizes/displays adequate comfort level or baseline comfort level  Description: Interventions:- Encourage patient to monitor pain and request assistance- Assess pain using appropriate pain scale- Administer analgesics based on type and severity of pain and evaluate response- Implement non-pharmacological measures as appropriate and evaluate response- Consider cultural and social influences on pain and pain management- Notify physician/advanced practitioner if interventions unsuccessful or patient reports new pain  Outcome: Progressing     Problem: SAFETY ADULT  Goal: Patient will remain free of falls  Description: INTERVENTIONS:- Educate patient/family on patient safety including physical limitations- Instruct patient to call for assistance with activity - Consult OT/PT to assist with strengthening/mobility - Keep Call bell within reach- Keep bed low and locked with side rails adjusted as appropriate- Keep care items and personal belongings within reach- Initiate and maintain comfort rounds- Make Fall Risk Sign visible to staff- Offer Toileting every 2 Hours, in advance of need- Initiate/Maintain bed/chair alarm- Obtain necessary fall risk management equipment: - Apply yellow socks and bracelet  for high fall risk patients- Consider moving patient to room near nurses station  Outcome: Progressing

## 2025-04-21 VITALS
BODY MASS INDEX: 19.12 KG/M2 | DIASTOLIC BLOOD PRESSURE: 70 MMHG | SYSTOLIC BLOOD PRESSURE: 136 MMHG | TEMPERATURE: 97.5 F | RESPIRATION RATE: 18 BRPM | WEIGHT: 111.99 LBS | OXYGEN SATURATION: 97 % | HEART RATE: 69 BPM | HEIGHT: 64 IN

## 2025-04-21 LAB
ANION GAP SERPL CALCULATED.3IONS-SCNC: 3 MMOL/L (ref 4–13)
BUN SERPL-MCNC: 17 MG/DL (ref 5–25)
CALCIUM SERPL-MCNC: 8.6 MG/DL (ref 8.4–10.2)
CHLORIDE SERPL-SCNC: 109 MMOL/L (ref 96–108)
CO2 SERPL-SCNC: 27 MMOL/L (ref 21–32)
CREAT SERPL-MCNC: 0.76 MG/DL (ref 0.6–1.3)
ERYTHROCYTE [DISTWIDTH] IN BLOOD BY AUTOMATED COUNT: 13.2 % (ref 11.6–15.1)
GFR SERPL CREATININE-BSD FRML MDRD: 69 ML/MIN/1.73SQ M
GLUCOSE SERPL-MCNC: 88 MG/DL (ref 65–140)
HCT VFR BLD AUTO: 35.2 % (ref 34.8–46.1)
HGB BLD-MCNC: 11.7 G/DL (ref 11.5–15.4)
MCH RBC QN AUTO: 31.8 PG (ref 26.8–34.3)
MCHC RBC AUTO-ENTMCNC: 33.2 G/DL (ref 31.4–37.4)
MCV RBC AUTO: 96 FL (ref 82–98)
PLATELET # BLD AUTO: 143 THOUSANDS/UL (ref 149–390)
PMV BLD AUTO: 8.7 FL (ref 8.9–12.7)
POTASSIUM SERPL-SCNC: 3.6 MMOL/L (ref 3.5–5.3)
RBC # BLD AUTO: 3.68 MILLION/UL (ref 3.81–5.12)
SODIUM SERPL-SCNC: 139 MMOL/L (ref 135–147)
WBC # BLD AUTO: 5.38 THOUSAND/UL (ref 4.31–10.16)

## 2025-04-21 PROCEDURE — 85027 COMPLETE CBC AUTOMATED: CPT | Performed by: INTERNAL MEDICINE

## 2025-04-21 PROCEDURE — 80048 BASIC METABOLIC PNL TOTAL CA: CPT | Performed by: INTERNAL MEDICINE

## 2025-04-21 PROCEDURE — 99239 HOSP IP/OBS DSCHRG MGMT >30: CPT

## 2025-04-21 RX ORDER — POLYETHYLENE GLYCOL 3350 17 G/17G
17 POWDER, FOR SOLUTION ORAL DAILY PRN
Start: 2025-04-21

## 2025-04-21 RX ORDER — LIDOCAINE 50 MG/G
1 PATCH TOPICAL DAILY
Start: 2025-04-22

## 2025-04-21 RX ORDER — SENNOSIDES 8.6 MG
8.6 TABLET ORAL
Start: 2025-04-21

## 2025-04-21 RX ORDER — OXYCODONE HYDROCHLORIDE 5 MG/1
2.5 TABLET ORAL EVERY 6 HOURS PRN
Qty: 6 TABLET | Refills: 0 | Status: SHIPPED | OUTPATIENT
Start: 2025-04-21 | End: 2025-04-24

## 2025-04-21 RX ORDER — ACETAMINOPHEN 325 MG/1
975 TABLET ORAL EVERY 8 HOURS SCHEDULED
Start: 2025-04-21 | End: 2025-04-28

## 2025-04-21 RX ORDER — DOCUSATE SODIUM 100 MG/1
100 CAPSULE, LIQUID FILLED ORAL 2 TIMES DAILY
Status: CANCELLED
Start: 2025-04-21 | End: 2025-05-21

## 2025-04-21 RX ADMIN — DOCUSATE SODIUM 100 MG: 100 CAPSULE, LIQUID FILLED ORAL at 10:05

## 2025-04-21 RX ADMIN — HEPARIN SODIUM 5000 UNITS: 5000 INJECTION INTRAVENOUS; SUBCUTANEOUS at 13:16

## 2025-04-21 RX ADMIN — HEPARIN SODIUM 5000 UNITS: 5000 INJECTION INTRAVENOUS; SUBCUTANEOUS at 05:27

## 2025-04-21 RX ADMIN — ACETAMINOPHEN 975 MG: 325 TABLET, FILM COATED ORAL at 05:27

## 2025-04-21 RX ADMIN — LEVOTHYROXINE SODIUM 25 MCG: 25 TABLET ORAL at 05:27

## 2025-04-21 RX ADMIN — LIDOCAINE 5% 1 PATCH: 700 PATCH TOPICAL at 10:05

## 2025-04-21 RX ADMIN — MULTIPLE VITAMINS W/ MINERALS TAB 1 TABLET: TAB ORAL at 10:05

## 2025-04-21 RX ADMIN — ACETAMINOPHEN 975 MG: 325 TABLET, FILM COATED ORAL at 13:16

## 2025-04-21 NOTE — PLAN OF CARE
Problem: Prexisting or High Potential for Compromised Skin Integrity  Goal: Skin integrity is maintained or improved  Description: INTERVENTIONS:- Identify patients at risk for skin breakdown- Assess and monitor skin integrity- Assess and monitor nutrition and hydration status- Monitor labs - Assess for incontinence - Turn and reposition patient- Assist with mobility/ambulation- Relieve pressure over bony prominences- Avoid friction and shearing- Provide appropriate hygiene as needed including keeping skin clean and dry- Evaluate need for skin moisturizer/barrier cream- Collaborate with interdisciplinary team - Patient/family teaching- Consider wound care consult   Outcome: Progressing     Problem: PAIN - ADULT  Goal: Verbalizes/displays adequate comfort level or baseline comfort level  Description: Interventions:- Encourage patient to monitor pain and request assistance- Assess pain using appropriate pain scale- Administer analgesics based on type and severity of pain and evaluate response- Implement non-pharmacological measures as appropriate and evaluate response- Consider cultural and social influences on pain and pain management- Notify physician/advanced practitioner if interventions unsuccessful or patient reports new pain  Outcome: Progressing     Problem: INFECTION - ADULT  Goal: Absence or prevention of progression during hospitalization  Description: INTERVENTIONS:- Assess and monitor for signs and symptoms of infection- Monitor lab/diagnostic results- Monitor all insertion sites, i.e. indwelling lines, tubes, and drains- Monitor endotracheal if appropriate and nasal secretions for changes in amount and color- Hope appropriate cooling/warming therapies per order- Administer medications as ordered- Instruct and encourage patient and family to use good hand hygiene technique- Identify and instruct in appropriate isolation precautions for identified infection/condition  Outcome: Progressing  Goal:  Absence of fever/infection during neutropenic period  Description: INTERVENTIONS:- Monitor WBC  Outcome: Progressing     Problem: SAFETY ADULT  Goal: Patient will remain free of falls  Description: INTERVENTIONS:- Educate patient/family on patient safety including physical limitations- Instruct patient to call for assistance with activity - Consult OT/PT to assist with strengthening/mobility - Keep Call bell within reach- Keep bed low and locked with side rails adjusted as appropriate- Keep care items and personal belongings within reach- Initiate and maintain comfort rounds- Make Fall Risk Sign visible to staff- Offer Toileting every 2 Hours, in advance of need- Initiate/Maintain alarm- Obtain necessary fall risk management equipment: - Apply yellow socks and bracelet for high fall risk patients- Consider moving patient to room near nurses station  Outcome: Progressing  Goal: Maintain or return to baseline ADL function  Description: INTERVENTIONS:-  Assess patient's ability to carry out ADLs; assess patient's baseline for ADL function and identify physical deficits which impact ability to perform ADLs (bathing, care of mouth/teeth, toileting, grooming, dressing, etc.)- Assess/evaluate cause of self-care deficits - Assess range of motion- Assess patient's mobility; develop plan if impaired- Assess patient's need for assistive devices and provide as appropriate- Encourage maximum independence but intervene and supervise when necessary- Involve family in performance of ADLs- Assess for home care needs following discharge - Consider OT consult to assist with ADL evaluation and planning for discharge- Provide patient education as appropriate  Outcome: Progressing  Goal: Maintains/Returns to pre admission functional level  Description: INTERVENTIONS:- Perform AM-PAC 6 Click Basic Mobility/ Daily Activity assessment daily.- Set and communicate daily mobility goal to care team and patient/family/caregiver. - Collaborate  with rehabilitation services on mobility goals if consulted- Perform Range of Motion 3 times a day.- Reposition patient every 3 hours.- Dangle patient 3 times a day- Stand patient 3 times a day- Ambulate patient 3 times a day- Out of bed to chair 3 times a day - Out of bed for meals 3 times a day- Out of bed for toileting- Record patient progress and toleration of activity level   Outcome: Progressing     Problem: DISCHARGE PLANNING  Goal: Discharge to home or other facility with appropriate resources  Description: INTERVENTIONS:- Identify barriers to discharge w/patient and caregiver- Arrange for needed discharge resources and transportation as appropriate- Identify discharge learning needs (meds, wound care, etc.)- Arrange for interpretive services to assist at discharge as needed- Refer to Case Management Department for coordinating discharge planning if the patient needs post-hospital services based on physician/advanced practitioner order or complex needs related to functional status, cognitive ability, or social support system  Outcome: Progressing     Problem: Knowledge Deficit  Goal: Patient/family/caregiver demonstrates understanding of disease process, treatment plan, medications, and discharge instructions  Description: Complete learning assessment and assess knowledge base.Interventions:- Provide teaching at level of understanding- Provide teaching via preferred learning methods  Outcome: Progressing

## 2025-04-21 NOTE — ASSESSMENT & PLAN NOTE
Patient presented after mechanical fall off of a elliptical.  Patient with ambulatory dysfunction, where patient feels she needs to get stronger.   PT/OT recommending Level II (moderate resource intensity).  Continue further management, as described above.

## 2025-04-21 NOTE — ASSESSMENT & PLAN NOTE
Lab Results   Component Value Date    EGFR 69 04/21/2025    EGFR 65 04/20/2025    EGFR 65 04/19/2025    CREATININE 0.76 04/21/2025    CREATININE 0.80 04/20/2025    CREATININE 0.80 04/19/2025     Per chart review, baseline creatinine 0.8-1.0.  Cr stable.   Continue to avoid and/or limit nephrotoxins.

## 2025-04-21 NOTE — CASE MANAGEMENT
Case Management Discharge Planning Note    Patient name Aide Lyonsp  Location /-01 MRN [id number]  : 1934 Date 2025       Current Admission Date: 2025  Current Admission Diagnosis:Closed fracture of multiple ribs of right side   Patient Active Problem List    Diagnosis Date Noted Date Diagnosed    Closed fracture of multiple ribs of right side 2025     Constipation 2025     Ambulatory dysfunction 2025     Acute pain due to trauma 2025     Chronic kidney disease, stage 3a (HCC) 2024     Acquired hypothyroidism 2024     Syncope 2024     CHICAS (dyspnea on exertion) 2024     Cervical spondylosis 2023     Full thickness rotator cuff tear 2023     Localized, primary osteoarthritis of shoulder region 2023       LOS (days): 3  Geometric Mean LOS (GMLOS) (days): 2.2  Days to GMLOS:-0.8     OBJECTIVE:  Risk of Unplanned Readmission Score: 10.11         Current admission status: Inpatient   Preferred Pharmacy:   [facility] [address] [place] [id number]  Phone: [phone] Fax: [phone]    Primary Care Provider: MAXINE Worthy    Primary Insurance: [employer]  Secondary Insurance:     DISCHARGE DETAILS:     CM received a 3p  time. CM notified provider and RN via secure chat, facility via Aidin, and pt in person.     Number/Name of Dispatcher: [phone]  Transported by (Company and Unit #): [facility]  ETA of Transport (Date): 25  ETA of Transport (Time): 1500

## 2025-04-21 NOTE — ASSESSMENT & PLAN NOTE
"Patient presented to ED after worsening pain in right rib cage s/p mechanical fall on 4/16.  Initially, patient was seen in urgent care, where she was given IM pain injection.  The patient presented to the ED due to worsening pain and discomfort.   CT c/a/p (4/18): \"Nondisplaced fractures, right posterolateral 10th and 11th ribs. No pneumothorax. Trace right effusion. Chronic compression deformity of T11.\"     Continue pain regimen, as appears pain is well-controlled.   Tylenol 975, every 8 hours scheduled.  Oxycodone 2.5 mg, every 6 hours as needed for moderate/severe pain.  Continue lidocaine patch, daily.   Continue coughing and deep breathing.   Continue incentive spirometry on discharge.  Patient to be transferred to [facility] for further rehabilitation needs.  "

## 2025-04-21 NOTE — ASSESSMENT & PLAN NOTE
Per review, CT c/a/p scan showing increased stool in rectum.  Continue bowel regimen.  Colace 100 mg, twice daily  Senna 8.6 mg, nightly  MiraLAX daily PRN  Continue to encourage ambulation, as tolerated.

## 2025-04-21 NOTE — PLAN OF CARE
Problem: Prexisting or High Potential for Compromised Skin Integrity  Goal: Skin integrity is maintained or improved  Description: INTERVENTIONS:- Identify patients at risk for skin breakdown- Assess and monitor skin integrity- Assess and monitor nutrition and hydration status- Monitor labs - Assess for incontinence - Turn and reposition patient- Assist with mobility/ambulation- Relieve pressure over bony prominences- Avoid friction and shearing- Provide appropriate hygiene as needed including keeping skin clean and dry- Evaluate need for skin moisturizer/barrier cream- Collaborate with interdisciplinary team - Patient/family teaching- Consider wound care consult   Outcome: Progressing     Problem: PAIN - ADULT  Goal: Verbalizes/displays adequate comfort level or baseline comfort level  Description: Interventions:- Encourage patient to monitor pain and request assistance- Assess pain using appropriate pain scale- Administer analgesics based on type and severity of pain and evaluate response- Implement non-pharmacological measures as appropriate and evaluate response- Consider cultural and social influences on pain and pain management- Notify physician/advanced practitioner if interventions unsuccessful or patient reports new pain  Outcome: Progressing     Problem: INFECTION - ADULT  Goal: Absence or prevention of progression during hospitalization  Description: INTERVENTIONS:- Assess and monitor for signs and symptoms of infection- Monitor lab/diagnostic results- Monitor all insertion sites, i.e. indwelling lines, tubes, and drains- Monitor endotracheal if appropriate and nasal secretions for changes in amount and color- Stuart appropriate cooling/warming therapies per order- Administer medications as ordered- Instruct and encourage patient and family to use good hand hygiene technique- Identify and instruct in appropriate isolation precautions for identified infection/condition  Outcome: Progressing      Problem: SAFETY ADULT  Goal: Patient will remain free of falls  Description: INTERVENTIONS:- Educate patient/family on patient safety including physical limitations- Instruct patient to call for assistance with activity - Consult OT/PT to assist with strengthening/mobility - Keep Call bell within reach- Keep bed low and locked with side rails adjusted as appropriate- Keep care items and personal belongings within reach- Initiate and maintain comfort rounds- Make Fall Risk Sign visible to staff- Offer Toileting every 2 Hours, in advance of need- Initiate/Maintain bed/chair alarm- Obtain necessary fall risk management equipment: - Apply yellow socks and bracelet for high fall risk patients- Consider moving patient to room near nurses station  INTERVENTIONS:- Educate patient/family on patient safety including physical limitations- Instruct patient to call for assistance with activity - Consult OT/PT to assist with strengthening/mobility - Keep Call bell within reach- Keep bed low and locked with side rails adjusted as appropriate- Keep care items and personal belongings within reach- Initiate and maintain comfort rounds- Make Fall Risk Sign visible to staff- Offer Toileting every 2 Hours, in advance of need- Initiate/Maintain bed/chair alarm- Obtain necessary fall risk management equipment: - Apply yellow socks and bracelet for high fall risk patients- Consider moving patient to room near nurses station  Outcome: Progressing     Problem: Potential for Falls  Goal: Patient will remain free of falls  Description: INTERVENTIONS:- Educate patient/family on patient safety including physical limitations- Instruct patient to call for assistance with activity - Consult OT/PT to assist with strengthening/mobility - Keep Call bell within reach- Keep bed low and locked with side rails adjusted as appropriate- Keep care items and personal belongings within reach- Initiate and maintain comfort rounds- Make Fall Risk Sign visible  to staff- Offer Toileting every 2 Hours, in advance of need- Initiate/Maintain bed/chair alarm- Obtain necessary fall risk management equipment: - Apply yellow socks and bracelet for high fall risk patients- Consider moving patient to room near nurses station  INTERVENTIONS:- Educate patient/family on patient safety including physical limitations- Instruct patient to call for assistance with activity - Consult OT/PT to assist with strengthening/mobility - Keep Call bell within reach- Keep bed low and locked with side rails adjusted as appropriate- Keep care items and personal belongings within reach- Initiate and maintain comfort rounds- Make Fall Risk Sign visible to staff- Offer Toileting every 2 Hours, in advance of need- Initiate/Maintain bed/chair alarm- Obtain necessary fall risk management equipment: - Apply yellow socks and bracelet for high fall risk patients- Consider moving patient to room near nurses station  Outcome: Progressing

## 2025-04-21 NOTE — CASE MANAGEMENT
Case Management Discharge Planning Note    Patient name Aide Lyonsp  Location /-01 MRN [id number]  : 1934 Date 2025       Current Admission Date: 2025  Current Admission Diagnosis:Closed fracture of multiple ribs of right side   Patient Active Problem List    Diagnosis Date Noted Date Diagnosed    Closed fracture of multiple ribs of right side 2025     Constipation 2025     Ambulatory dysfunction 2025     Acute pain due to trauma 2025     Chronic kidney disease, stage 3a (HCC) 2024     Acquired hypothyroidism 2024     Syncope 2024     CHICAS (dyspnea on exertion) 2024     Cervical spondylosis 2023     Full thickness rotator cuff tear 2023     Localized, primary osteoarthritis of shoulder region 2023       LOS (days): 3  Geometric Mean LOS (GMLOS) (days): 2.2  Days to GMLOS:-0.7     OBJECTIVE:  Risk of Unplanned Readmission Score: 10.11         Current admission status: Inpatient   Preferred Pharmacy:   [facility] [address] [place] [address]  Phone: [phone] Fax: [phone]    Primary Care Provider: MAXINE Worthy    Primary Insurance: BLUE CROSS [employer] REP  Secondary Insurance:     DISCHARGE DETAILS:        IMM reviewed with patient, patient agrees with discharge determination.      IMM Given (Date):: 25 (1048am)  IMM Given to:: Patient          Accepting Facility Name, City & State : [facility]  Receiving Facility/Agency Phone Number: [phone]  Facility/Agency Fax Number: [phone]

## 2025-04-21 NOTE — DISCHARGE INSTR - AVS FIRST PAGE
Follow-Up:   Please follow-up with PCP within one week of discharge.    Medication Changes:   Start taking acetaminophen 975 mg, every 8 hours.  Start taking lidocaine patch, daily for 12 hours.  Start taking senna 8.6 mg, daily at bedtime.  Start taking MiraLAX 17 g, daily PRN.   Start taking oxycodone 2.5 mg, every 6 hours as needed for moderate/severe pain x 3-5 days.    Is a pleasure to take care of you in the hospital!    Please continue to stay well,  MAXINE Molina  [facility] Internal Medicine

## 2025-04-21 NOTE — DISCHARGE SUMMARY
"Discharge Summary - Hospitalist   Name: Aide Yanes 89 y.o. female I MRN: [id number]  Unit/Bed#: -01 I Date of Admission: 4/18/2025   Date of Service: 4/21/2025 I Hospital Day: 3     Assessment & Plan  Closed fracture of multiple ribs of right side  Patient presented to ED after worsening pain in right rib cage s/p mechanical fall on 4/16.  Initially, patient was seen in urgent care, where she was given IM pain injection.  The patient presented to the ED due to worsening pain and discomfort.   CT c/a/p (4/18): \"Nondisplaced fractures, right posterolateral 10th and 11th ribs. No pneumothorax. Trace right effusion. Chronic compression deformity of T11.\"     Continue pain regimen, as appears pain is well-controlled.   Tylenol 975, every 8 hours scheduled.  Oxycodone 2.5 mg, every 6 hours as needed for moderate/severe pain.  Continue lidocaine patch, daily.   Continue coughing and deep breathing.   Continue incentive spirometry on discharge.  Patient to be transferred to [facility] for further rehabilitation needs.  Ambulatory dysfunction  Patient presented after mechanical fall off of a elliptical.  Patient with ambulatory dysfunction, where patient feels she needs to get stronger.   PT/OT recommending Level II (moderate resource intensity).  Continue further management, as described above.  Constipation  Per review, CT c/a/p scan showing increased stool in rectum.  Continue bowel regimen.  Colace 100 mg, twice daily  Senna 8.6 mg, nightly  MiraLAX daily PRN  Continue to encourage ambulation, as tolerated.  Chronic kidney disease, stage 3a (HCC)  Lab Results   Component Value Date    EGFR 69 04/21/2025    EGFR 65 04/20/2025    EGFR 65 04/19/2025    CREATININE 0.76 04/21/2025    CREATININE 0.80 04/20/2025    CREATININE 0.80 04/19/2025     Per chart review, baseline creatinine 0.8-1.0.  Cr stable.   Continue to avoid and/or limit nephrotoxins.  Acquired hypothyroidism  Continue levothyroxine 25 mcg, daily.   " "  Medical Problems       Resolved Problems  Date Reviewed: 10/17/2024   None       Discharging Physician / Practitioner: MAXINE Sosa  PCP: MAXINE Worthy  Admission Date:   Admission Orders (From admission, onward)       Ordered        04/18/25 1204  INPATIENT ADMISSION  Once                          Discharge Date: 04/21/25    Consultations During Hospital Stay:  None    Procedures Performed:   None    Significant Findings / Test Results:   XR chest portable (4/18): \"No acute cardiopulmonary disease.\"   CT head wo contrast (4/18): \"No acute intracranial abnormality.\"   CT spine cervical wo contrast (4/18): \"No cervical spine fracture or traumatic malalignment.\"   CT c/a/p w contrast (4/18): \"1. Nondisplaced fractures, right posterolateral 10th and 11th ribs. No pneumothorax. Trace right effusion. 2. Chronic compression deformity of T11. 3. No acute traumatic injury in the abdomen or pelvis. 4. Increase stool within the rectum    Incidental Findings:   None      Test Results Pending at Discharge (will require follow up):   None      Outpatient Tests Requested:  I would recommend follow-up with PCP in 1 week.    Complications:  None     Reason for Admission: mechanical fall     Hospital Course:   Aide Yanes is a 89 y.o. female patient who originally presented to the hospital on 4/18/2025 after mechanical fall at home.  Patient explaining that she was utilizing her elliptical, when she fell off of the elliptical and landed on a leather recliner chair.  The patient explained that she was able to get up, but did not note any immediate pain.  However, the following day the patient explained that she was having worsening pain in the right side of her rib cage.  The patient's family determined that since patient fell, it was best that patient was to be evaluated due to continued pain.  The patient was evaluated in the ED, where CT c/a/p noted nondisplaced right posterior lateral 10th/11th rib " "fractures.  The patient was admitted for further evaluation by the PT/OT team.  The patient had completed PT/OT, which it was recommended the patient be discharged to rehabilitation facility.  The patient will be going to [facility] for further rehabilitation needs.    Please see above list of diagnoses and related plan for additional information.     Condition at Discharge: stable    Discharge Day Visit / Exam:   Subjective:  The patient was seen and examined at the bedside this morning. The patient expressed that she is feeling good, but still having some residual pain symptoms on the right side of her chest. The patient explaining that she has been up and OOB, where she has been ambulating within the room. The patient was explaining some sadness today, as she just noted that the Renee has passed away.     Per nursing, no acute events overnight.     Vitals: Blood Pressure: 136/70 (04/21/25 0730)  Pulse: 69 (04/21/25 0730)  Temperature: 97.5 °F (36.4 °C) (04/21/25 0730)  Temp Source: Temporal (04/21/25 0730)  Respirations: 18 (04/21/25 0730)  Height: 5' 4\" (162.6 cm) (04/18/25 1319)  Weight - Scale: 50.8 kg (111 lb 15.9 oz) (04/18/25 1319)  SpO2: 97 % (04/21/25 0730)    Physical Exam  Vitals and nursing note reviewed.   Constitutional:       General: She is awake. She is not in acute distress.     Appearance: Normal appearance. She is not ill-appearing.   HENT:      Head: Normocephalic and atraumatic.   Eyes:      General: No scleral icterus.     Conjunctiva/sclera: Conjunctivae normal.      Comments: On exam, patient wears glasses.   Cardiovascular:      Rate and Rhythm: Normal rate and regular rhythm.      Heart sounds: Normal heart sounds, S1 normal and S2 normal. No murmur heard.  Pulmonary:      Effort: Pulmonary effort is normal. No respiratory distress.      Breath sounds: Normal breath sounds. No wheezing, rhonchi or rales.   Chest:      Chest wall: Tenderness (R rib cage) present.   Abdominal:      General: " Bowel sounds are normal. There is no distension.      Tenderness: There is no abdominal tenderness.   Musculoskeletal:      Cervical back: Normal range of motion.      Right lower leg: No edema.      Left lower leg: No edema.   Skin:     General: Skin is warm and dry.      Comments: On exam, no evidence of open wounds/lesions on exposed skin. However, patient does have slight bruising, which is yellowing on the right rib cage.    Neurological:      Mental Status: She is alert and oriented to person, place, and time.      Sensory: Sensation is intact.      Motor: Weakness (generalized) present.   Psychiatric:         Attention and Perception: Attention normal.         Mood and Affect: Mood normal.         Speech: Speech normal.         Behavior: Behavior is cooperative.        Discussion with Family: Updated  (daughter) via phone.    Discharge instructions/Information to patient and family:   See after visit summary for information provided to patient and family.      Provisions for Follow-Up Care:  See after visit summary for information related to follow-up care and any pertinent home health orders.      Mobility at time of Discharge:   Basic Mobility Inpatient Raw Score: 19  JH-HLM Goal: 6: Walk 10 steps or more  JH-HLM Achieved: 6: Walk 10 steps or more  HLM Goal achieved. Continue to encourage appropriate mobility.     Disposition:   Other Skilled Nursing Facility at [facility]    Planned Readmission: No, no planned readmission.     Discharge Medications:  See after visit summary for reconciled discharge medications provided to patient and/or family.      Administrative Statements   Discharge Statement:  I have spent a total time of 60 minutes in caring for this patient on the day of the visit/encounter. >30 minutes of time was spent on: Diagnostic results, Risks and benefits of tx options, Instructions for management, Patient and family education, Importance of tx compliance, Counseling / Coordination  of care, Documenting in the medical record, Reviewing / ordering tests, medicine, procedures  , and Communicating with other healthcare professionals .    **Please Note: This note may have been constructed using a voice recognition system**

## 2025-04-21 NOTE — CASE MANAGEMENT
Case Management Discharge Planning Note    Patient name Aide Lyonsp  Location /-01 MRN [id number]  : 1934 Date 2025       Current Admission Date: 2025  Current Admission Diagnosis:Closed fracture of multiple ribs of right side   Patient Active Problem List    Diagnosis Date Noted Date Diagnosed    Closed fracture of multiple ribs of right side 2025     Constipation 2025     Ambulatory dysfunction 2025     Acute pain due to trauma 2025     Chronic kidney disease, stage 3a (HCC) 2024     Acquired hypothyroidism 2024     Syncope 2024     CHICAS (dyspnea on exertion) 2024     Cervical spondylosis 2023     Full thickness rotator cuff tear 2023     Localized, primary osteoarthritis of shoulder region 2023       LOS (days): 3  Geometric Mean LOS (GMLOS) (days): 2.2  Days to GMLOS:-0.7     OBJECTIVE:  Risk of Unplanned Readmission Score: 10.11         Current admission status: Inpatient   Preferred Pharmacy:   [address] [address]  Phone: [phone] Fax: [phone]    Primary Care Provider: MAXINE Worthy    Primary Insurance: BLUE CROSS  REP  Secondary Insurance:     DISCHARGE DETAILS:     CM received auth for pt to go to [facility]. CM updated facility via Aidin and provider via secure chat. Waiting to hear back from provider if pt is medically stable for discharge today.

## 2025-04-21 NOTE — CASE MANAGEMENT
Case Management Discharge Planning Note    Patient name Aide GRIMALDO Joaquín  Location /-01 MRN [id number]  : 1934 Date 2025       Current Admission Date: 2025  Current Admission Diagnosis:Closed fracture of multiple ribs of right side   Patient Active Problem List    Diagnosis Date Noted Date Diagnosed    Closed fracture of multiple ribs of right side 2025     Constipation 2025     Ambulatory dysfunction 2025     Acute pain due to trauma 2025     Chronic kidney disease, stage 3a (HCC) 2024     Acquired hypothyroidism 2024     Syncope 2024     CHICAS (dyspnea on exertion) 2024     Cervical spondylosis 2023     Full thickness rotator cuff tear 2023     Localized, primary osteoarthritis of shoulder region 2023       LOS (days): 3  Geometric Mean LOS (GMLOS) (days): 2.2  Days to GMLOS:-0.7     OBJECTIVE:  Risk of Unplanned Readmission Score: 10.11         Current admission status: Inpatient   Preferred Pharmacy:   [facility] [address] [place] [address]  Phone: [phone] Fax: [phone]    Primary Care Provider: MAXINE Worthy    Primary Insurance: BLUE CROSS [employer] REP  Secondary Insurance:     DISCHARGE DETAILS:        Per provider, pt is stable to go to [facility] today. CM updated facility via Aidin. CM requested [facility] via [employer]. Awaiting confirmed  time.     Accepting Facility Name, City & State : [facility]  Receiving Facility/Agency Phone Number: [phone]  Facility/Agency Fax Number: [phone]

## 2025-04-22 NOTE — CASE MANAGEMENT
Case Management Progress Note    Patient name Aide Lyonsp  Location /-01 MRN [id number]  : 1934 Date 2025       LOS (days): 3  Geometric Mean LOS (GMLOS) (days): 2.2  Days to GMLOS:-0.9        OBJECTIVE:        Current admission status: Inpatient  Preferred Pharmacy:   Pending sale to [facility] [address] [address]  Phone: [phone] Fax: [phone]    Primary Care Provider: MAXINE Worthy    Primary Insurance: BLUE CROSS [employer] REP  Secondary Insurance:     PROGRESS NOTE:    Notification made to OP CM Handoff: [facility] OP CM regarding discharge planning and disposition.   Spoke with Demi LOPES

## 2025-04-22 NOTE — UTILIZATION REVIEW
NOTIFICATION OF ADMISSION DISCHARGE   This is a Notification of Discharge from [facility]. Please be advised that this patient has been discharge from our facility. Below you will find the admission and discharge date and time including the patient’s disposition.   UTILIZATION REVIEW CONTACT:  Utilization Review Assistants  Network Utilization Review Department  Phone: [phone] x carefully listen to the prompts. All voicemails are confidential.  Email: [email or web address]     ADMISSION INFORMATION  PRESENTATION DATE: 4/18/2025  8:57 AM  OBERVATION ADMISSION DATE: N/A  INPATIENT ADMISSION DATE: 4/18/25 12:04 PM   DISCHARGE DATE: 4/21/2025  3:10 PM   DISPOSITION:Non [facility] SNF/TCU/SNU    Network Utilization Review Department  ATTENTION: Please call with any questions or concerns to [phone] and carefully listen to the prompts so that you are directed to the right person. All voicemails are confidential.   For Discharge needs, contact Care Management DC Support Team at [phone] opt. [phone]  Send all requests for admission clinical reviews, approved or denied determinations and any other requests to dedicated fax number below belonging to the campus where the patient is receiving treatment. List of dedicated fax numbers for the Facilities:  FACILITY NAME UR FAX NUMBER   ADMISSION DENIALS (Administrative/Medical Necessity) [phone]   DISCHARGE SUPPORT TEAM ([facility]) [phone]   PARENT CHILD HEALTH (Maternity/NICU/Pediatrics) [phone]   [facility] [phone]   General acute hospital [phone]   [facility] [phone]   [facility] [phone]   [facility] [phone]   [facility] [phone]   [facility] [phone]   [facility] [phone]   [facility]  [facility] [phone]   [facility] [phone]   [facility] [phone]   [facility] [phone]

## 2025-04-23 ENCOUNTER — NURSING HOME VISIT (OUTPATIENT)
Dept: FAMILY MEDICINE CLINIC | Facility: CLINIC | Age: OVER 89
End: 2025-04-23
Payer: COMMERCIAL

## 2025-04-23 DIAGNOSIS — E03.9 ACQUIRED HYPOTHYROIDISM: ICD-10-CM

## 2025-04-23 DIAGNOSIS — G89.11 ACUTE PAIN DUE TO TRAUMA: ICD-10-CM

## 2025-04-23 DIAGNOSIS — N18.31 CHRONIC KIDNEY DISEASE, STAGE 3A (HCC): ICD-10-CM

## 2025-04-23 DIAGNOSIS — R26.2 AMBULATORY DYSFUNCTION: ICD-10-CM

## 2025-04-23 DIAGNOSIS — S22.41XA CLOSED FRACTURE OF MULTIPLE RIBS OF RIGHT SIDE, INITIAL ENCOUNTER: Primary | ICD-10-CM

## 2025-04-23 PROCEDURE — 99305 1ST NF CARE MODERATE MDM 35: CPT | Performed by: FAMILY MEDICINE

## 2025-04-23 NOTE — PROGRESS NOTES
[facility]  [address] [place] [address]  Facility: [facility]    NAME: Aide Yanes  AGE: 89 y.o. SEX: female    DATE OF ENCOUNTER: 2025    Code status:  DNR w/ Hospitalization    Assessment and Plan     1. Closed fracture of multiple ribs of right side, initial encounter  2. Acquired hypothyroidism  3. Chronic kidney disease, stage 3a (HCC)  4. Ambulatory dysfunction  5. Acute pain due to trauma      All medications and routine orders were reviewed and updated as needed.    Plan discussed with: Family member    Chief Complaint     Seen for admission at Nursing Home    History of Present Illness     89-year-old female here after sustaining a fall resulting in multiple rib fractures on the right side.  She reports that her pain is under decent control.  She notes difficulty with constipation.  Her appetite is at baseline.  She has no dyspnea.  She normally lives alone and is independent with activities of daily living.  She has no dysuria.    HISTORY:  Past Medical History:   Diagnosis Date    Anxiety     Disease of thyroid gland      Past Surgical History:   Procedure Laterality Date    AUGMENTATION BREAST       SECTION      MASTECTOMY      SHOULDER SURGERY       Family History   Problem Relation Age of Onset    Breast cancer Mother         after menopuse  b/l mastectomy  pt states  3 sisters    Breast cancer Daughter     Ovarian cancer Neg Hx      Social History     Socioeconomic History    Marital status:      Spouse name: None    Number of children: None    Years of education: None    Highest education level: None   Occupational History    None   Tobacco Use    Smoking status: Never    Smokeless tobacco: Never   Vaping Use    Vaping status: Never Used   Substance and Sexual Activity    Alcohol use: Not Currently    Drug use: Never    Sexual activity: Not Currently   Other Topics Concern    None   Social History Narrative    None     Social Drivers of Health      Financial Resource Strain: Not on file   Food Insecurity: No Food Insecurity (2025)    Nursing - Inadequate Food Risk Classification     Worried About Running Out of Food in the Last Year: Not on file     Ran Out of Food in the Last Year: Not on file     Ran Out of Food in the Last Year: Never true   Transportation Needs: No Transportation Needs (2025)    Nursing - Transportation Risk Classification     Lack of Transportation: Not on file     Lack of Transportation: No   Physical Activity: Not on file   Stress: Not on file   Social Connections: Not on file   Intimate Partner Violence: Unknown (2025)    Nursing IPS     Feels Physically and Emotionally Safe: Not on file     Physically Hurt by Someone: Not on file     Humiliated or Emotionally Abused by Someone: Not on file     Physically Hurt by Someone: No     Hurt or Threatened by Someone: No   Housing Stability: Unknown (2025)    Nursing: Inadequate Housing Risk Classification     Has Housing: Not on file     Worried About Losing Housing: Not on file     Unable to Get Utilities: Not on file     Unable to Pay for Housing in the Last Year: No     Has Housin       Allergies:  Allergies   Allergen Reactions    Hylan G-F 20 Other (See Comments)    Influenza Virus Vaccine Other (See Comments)       Review of Systems     Review of Systems   Constitutional:  Negative for activity change, appetite change, chills, diaphoresis, fatigue and unexpected weight change.   HENT:  Negative for congestion, ear discharge, ear pain, hearing loss, nosebleeds and rhinorrhea.    Eyes:  Negative for pain, redness, itching and visual disturbance.   Respiratory:  Negative for cough, choking, chest tightness and shortness of breath.    Cardiovascular:  Positive for chest pain. Negative for leg swelling.   Gastrointestinal:  Negative for abdominal pain, blood in stool, constipation, diarrhea and nausea.   Endocrine: Negative for cold intolerance, polydipsia and  polyphagia.   Genitourinary:  Negative for dysuria, frequency, hematuria and urgency.   Musculoskeletal:  Negative for arthralgias, back pain, gait problem, joint swelling, neck pain and neck stiffness.   Skin:  Negative for color change and rash.   Allergic/Immunologic: Negative for environmental allergies and food allergies.   Neurological:  Positive for weakness. Negative for dizziness, tremors, seizures, speech difficulty, numbness and headaches.   Hematological:  Negative for adenopathy. Does not bruise/bleed easily.   Psychiatric/Behavioral:  Negative for behavioral problems, dysphoric mood, hallucinations and self-injury.        Medications and orders     All medications reviewed and updated in detention EMR.      Objective     Vitals: per nursing home record    Physical Exam  Constitutional:       Appearance: Normal appearance. She is well-developed.   HENT:      Head: Normocephalic and atraumatic.      Right Ear: External ear normal.      Left Ear: External ear normal.      Mouth/Throat:      Mouth: Mucous membranes are moist.      Pharynx: Oropharynx is clear. No oropharyngeal exudate.   Eyes:      General: No scleral icterus.        Right eye: No discharge.         Left eye: No discharge.      Extraocular Movements: Extraocular movements intact.      Conjunctiva/sclera: Conjunctivae normal.      Pupils: Pupils are equal, round, and reactive to light.   Neck:      Thyroid: No thyromegaly.   Cardiovascular:      Rate and Rhythm: Normal rate and regular rhythm.      Heart sounds: Murmur heard.      No gallop.   Pulmonary:      Effort: Pulmonary effort is normal. No respiratory distress.      Breath sounds: Normal breath sounds. No wheezing or rales.   Abdominal:      General: Bowel sounds are normal.      Palpations: Abdomen is soft. There is no mass.      Tenderness: There is no guarding or rebound.   Musculoskeletal:         General: Tenderness present. No deformity. Normal range of motion.       Cervical back: Normal range of motion and neck supple.   Lymphadenopathy:      Cervical: No cervical adenopathy.   Skin:     General: Skin is warm and dry.      Findings: No rash.   Neurological:      Mental Status: She is alert and oriented to person, place, and time.      Cranial Nerves: No cranial nerve deficit.      Motor: Weakness present.      Coordination: Coordination normal.      Deep Tendon Reflexes: Reflexes are normal and symmetric. Reflexes normal.   Psychiatric:         Mood and Affect: Mood normal.         Behavior: Behavior normal.         Thought Content: Thought content normal.         Judgment: Judgment normal.         Pertinent Laboratory/Diagnostic Studies:   The following labs/studies were reviewed please see chart or hospital paperwork for details.  Diagnostic studies from the hospital were reviewed    - Admit for PT OT and medical therapy.  Continue using Tylenol and the lidocaine patch.  She has oxycodone available as needed.  She will receive MiraLAX and prune juice on a daily basis    Bill Shelley DO  4/23/2025 12:32 PM

## 2025-04-24 ENCOUNTER — NURSING HOME VISIT (OUTPATIENT)
Dept: FAMILY MEDICINE CLINIC | Facility: CLINIC | Age: OVER 89
End: 2025-04-24
Payer: COMMERCIAL

## 2025-04-24 DIAGNOSIS — K59.00 CONSTIPATION, UNSPECIFIED CONSTIPATION TYPE: ICD-10-CM

## 2025-04-24 DIAGNOSIS — R26.2 AMBULATORY DYSFUNCTION: ICD-10-CM

## 2025-04-24 DIAGNOSIS — S22.41XD CLOSED FRACTURE OF MULTIPLE RIBS OF RIGHT SIDE WITH ROUTINE HEALING, SUBSEQUENT ENCOUNTER: Primary | ICD-10-CM

## 2025-04-24 DIAGNOSIS — G89.11 ACUTE PAIN DUE TO TRAUMA: ICD-10-CM

## 2025-04-24 PROCEDURE — 99308 SBSQ NF CARE LOW MDM 20: CPT | Performed by: FAMILY MEDICINE

## 2025-04-24 NOTE — PROGRESS NOTES
[facility]  [address] [place] [address]  Facility: [facility]    NAME: Aide Yanes  AGE: 89 y.o. SEX: female    DATE OF ENCOUNTER: 4/24/2025    Code status:  DNR w/ Hospitalization    Assessment and Plan     1. Closed fracture of multiple ribs of right side with routine healing, subsequent encounter  2. Constipation, unspecified constipation type  3. Acute pain due to trauma  4. Ambulatory dysfunction      All medications and routine orders were reviewed and updated as needed.    Plan discussed with: Patient    Chief Complaint     Interim evaluation    History of Present Illness     89-year-old female seen for interim evaluation.  She notes that her pain is under improved control.  She still has not moved her bowels and requests MiraLAX in prune juice.  She has been cleared to ambulate independently.  She is hopeful that she can return home soon.    The following portions of the patient's history were reviewed and updated as appropriate: current medications, past family history, past medical history, past social history, past surgical history and problem list.    Allergies:  Allergies   Allergen Reactions    Hylan G-F 20 Other (See Comments)    Influenza Virus Vaccine Other (See Comments)       Review of Systems     Review of Systems   Constitutional:  Negative for activity change, appetite change, chills, diaphoresis, fatigue and unexpected weight change.   HENT:  Negative for congestion, ear discharge, ear pain, hearing loss, nosebleeds and rhinorrhea.    Eyes:  Negative for pain, redness, itching and visual disturbance.   Respiratory:  Negative for cough, choking, chest tightness and shortness of breath.    Cardiovascular:  Positive for chest pain. Negative for leg swelling.   Gastrointestinal:  Negative for abdominal pain, blood in stool, constipation, diarrhea and nausea.   Endocrine: Negative for cold intolerance, polydipsia and polyphagia.   Genitourinary:  Negative  for dysuria, frequency, hematuria and urgency.   Musculoskeletal:  Positive for gait problem. Negative for arthralgias, back pain, joint swelling, neck pain and neck stiffness.   Skin:  Negative for color change and rash.   Allergic/Immunologic: Negative for environmental allergies and food allergies.   Neurological:  Positive for weakness. Negative for dizziness, tremors, seizures, speech difficulty, numbness and headaches.   Hematological:  Negative for adenopathy. Does not bruise/bleed easily.   Psychiatric/Behavioral:  Negative for behavioral problems, dysphoric mood, hallucinations and self-injury.        Medications and orders     All medications reviewed and updated in correction EMR.      Objective     Vitals: per nursing home records    Physical Exam  Constitutional:       Appearance: Normal appearance. She is well-developed.   HENT:      Head: Normocephalic and atraumatic.      Right Ear: External ear normal.      Left Ear: External ear normal.      Mouth/Throat:      Mouth: Mucous membranes are moist.      Pharynx: Oropharynx is clear. No oropharyngeal exudate.   Eyes:      General: No scleral icterus.        Right eye: No discharge.         Left eye: No discharge.      Extraocular Movements: Extraocular movements intact.      Conjunctiva/sclera: Conjunctivae normal.      Pupils: Pupils are equal, round, and reactive to light.   Neck:      Thyroid: No thyromegaly.   Cardiovascular:      Rate and Rhythm: Normal rate and regular rhythm.      Heart sounds: Murmur heard.      No gallop.   Pulmonary:      Effort: Pulmonary effort is normal. No respiratory distress.      Breath sounds: Normal breath sounds. No wheezing or rales.   Abdominal:      General: Bowel sounds are normal.      Palpations: Abdomen is soft. There is no mass.      Tenderness: There is no guarding or rebound.   Musculoskeletal:         General: Tenderness present. No deformity. Normal range of motion.      Cervical back: Normal range of  motion and neck supple.   Lymphadenopathy:      Cervical: No cervical adenopathy.   Skin:     General: Skin is warm and dry.      Findings: No rash.   Neurological:      Mental Status: She is alert and oriented to person, place, and time.      Cranial Nerves: No cranial nerve deficit.      Coordination: Coordination normal.      Deep Tendon Reflexes: Reflexes are normal and symmetric. Reflexes normal.   Psychiatric:         Mood and Affect: Mood normal.         Behavior: Behavior normal.         Thought Content: Thought content normal.         Judgment: Judgment normal.         Pertinent Laboratory/Diagnostic Studies:     The following studies were reviewed please see chart or hospital paperwork for details.    Space for lab dictation no new diagnostics    - Continue the current therapy plan and medication regimen.  She will take the MiraLAX daily    Bill Shelley DO  4/24/2025 12:44 PM

## 2025-04-28 ENCOUNTER — NURSING HOME VISIT (OUTPATIENT)
Dept: FAMILY MEDICINE CLINIC | Facility: CLINIC | Age: OVER 89
End: 2025-04-28
Payer: COMMERCIAL

## 2025-04-28 DIAGNOSIS — N18.31 CHRONIC KIDNEY DISEASE, STAGE 3A (HCC): ICD-10-CM

## 2025-04-28 DIAGNOSIS — S22.41XD CLOSED FRACTURE OF MULTIPLE RIBS OF RIGHT SIDE WITH ROUTINE HEALING, SUBSEQUENT ENCOUNTER: Primary | ICD-10-CM

## 2025-04-28 DIAGNOSIS — G89.11 ACUTE PAIN DUE TO TRAUMA: ICD-10-CM

## 2025-04-28 DIAGNOSIS — K59.00 CONSTIPATION, UNSPECIFIED CONSTIPATION TYPE: ICD-10-CM

## 2025-04-28 DIAGNOSIS — E03.9 ACQUIRED HYPOTHYROIDISM: ICD-10-CM

## 2025-04-28 PROCEDURE — 99315 NF DSCHRG MGMT 30 MIN/LESS: CPT | Performed by: FAMILY MEDICINE

## 2025-04-28 NOTE — PROGRESS NOTES
[facility]  [address] [place] [address]  Facility: [facility]    NAME: Aide Yanes  AGE: 89 y.o. SEX: female    DATE OF ENCOUNTER: 4/28/2025    Code status:  DNR w/ Hospitalization    Assessment and Plan     1. Closed fracture of multiple ribs of right side with routine healing, subsequent encounter  2. Acquired hypothyroidism  3. Chronic kidney disease, stage 3a (HCC)  4. Constipation, unspecified constipation type  5. Acute pain due to trauma      All medications and routine orders were reviewed and updated as needed.    Plan discussed with: Family member    Chief Complaint     Interim evaluation    History of Present Illness     The patient is discharging to home tomorrow.  She lives independently and her pain is under improved control.  Her constipation is resolved.  He has no dysuria.  She denies any dyspnea    The following portions of the patient's history were reviewed and updated as appropriate: current medications, past family history, past medical history, past social history, past surgical history and problem list.    Allergies:  Allergies   Allergen Reactions    Hylan G-F 20 Other (See Comments)    Influenza Virus Vaccine Other (See Comments)       Review of Systems     Review of Systems   Constitutional:  Negative for activity change, appetite change, chills, diaphoresis, fatigue and unexpected weight change.   HENT:  Negative for congestion, ear discharge, ear pain, hearing loss, nosebleeds and rhinorrhea.    Eyes:  Negative for pain, redness, itching and visual disturbance.   Respiratory:  Negative for cough, choking, chest tightness and shortness of breath.    Cardiovascular:  Positive for chest pain. Negative for leg swelling.   Gastrointestinal:  Negative for abdominal pain, blood in stool, constipation, diarrhea and nausea.   Endocrine: Negative for cold intolerance, polydipsia and polyphagia.   Genitourinary:  Negative for dysuria, frequency, hematuria  and urgency.   Musculoskeletal:  Negative for arthralgias, back pain, gait problem, joint swelling, neck pain and neck stiffness.   Skin:  Negative for color change and rash.   Allergic/Immunologic: Negative for environmental allergies and food allergies.   Neurological:  Positive for weakness. Negative for dizziness, tremors, seizures, speech difficulty, numbness and headaches.   Hematological:  Negative for adenopathy. Does not bruise/bleed easily.   Psychiatric/Behavioral:  Negative for behavioral problems, dysphoric mood, hallucinations and self-injury.        Medications and orders     All medications reviewed and updated in long-term EMR.      Objective     Vitals: per nursing home records    Physical Exam  Constitutional:       Appearance: Normal appearance. She is well-developed.   HENT:      Head: Normocephalic and atraumatic.      Right Ear: External ear normal.      Left Ear: External ear normal.      Mouth/Throat:      Mouth: Mucous membranes are moist.      Pharynx: Oropharynx is clear. No oropharyngeal exudate.   Eyes:      General: No scleral icterus.        Right eye: No discharge.         Left eye: No discharge.      Extraocular Movements: Extraocular movements intact.      Conjunctiva/sclera: Conjunctivae normal.      Pupils: Pupils are equal, round, and reactive to light.   Neck:      Thyroid: No thyromegaly.   Cardiovascular:      Rate and Rhythm: Normal rate and regular rhythm.      Heart sounds: Normal heart sounds. No murmur heard.     No gallop.   Pulmonary:      Effort: Pulmonary effort is normal. No respiratory distress.      Breath sounds: Normal breath sounds. No wheezing or rales.   Abdominal:      General: Bowel sounds are normal.      Palpations: Abdomen is soft. There is no mass.      Tenderness: There is no guarding or rebound.   Musculoskeletal:         General: Tenderness present. No deformity. Normal range of motion.      Cervical back: Normal range of motion and neck supple.    Lymphadenopathy:      Cervical: No cervical adenopathy.   Skin:     General: Skin is warm and dry.      Findings: No rash.   Neurological:      Mental Status: She is alert and oriented to person, place, and time.      Cranial Nerves: No cranial nerve deficit.      Coordination: Coordination normal.      Deep Tendon Reflexes: Reflexes are normal and symmetric. Reflexes normal.   Psychiatric:         Mood and Affect: Mood normal.         Behavior: Behavior normal.         Thought Content: Thought content normal.         Judgment: Judgment normal.         Pertinent Laboratory/Diagnostic Studies:     The following studies were reviewed please see chart or hospital paperwork for details.    Space for lab dictation no new diagnostics    - Discharge planning    Bill Shelley DO  4/28/2025 12:20 PM

## 2025-05-07 ENCOUNTER — TELEPHONE (OUTPATIENT)
Age: OVER 89
End: 2025-05-07

## 2025-05-07 NOTE — TELEPHONE ENCOUNTER
[facility]  called about referral for [facility] and they tried to contact her to try to set up home care services and she and her family are adamant that she does not need them.  They do no want another phone call about that from [facility].    Bill Shelley DO (Physician)  Family Medicine  Expand All Collapse All  [facility]  [address] [place] [address]  Facility: [facility]

## 2025-06-19 ENCOUNTER — OFFICE VISIT (OUTPATIENT)
Dept: OBGYN CLINIC | Facility: CLINIC | Age: OVER 89
End: 2025-06-19
Payer: COMMERCIAL

## 2025-06-19 VITALS — BODY MASS INDEX: 18.95 KG/M2 | HEIGHT: 64 IN | WEIGHT: 111 LBS

## 2025-06-19 DIAGNOSIS — M17.12 PRIMARY OSTEOARTHRITIS OF LEFT KNEE: Primary | ICD-10-CM

## 2025-06-19 PROCEDURE — 20610 DRAIN/INJ JOINT/BURSA W/O US: CPT | Performed by: STUDENT IN AN ORGANIZED HEALTH CARE EDUCATION/TRAINING PROGRAM

## 2025-06-19 PROCEDURE — 99213 OFFICE O/P EST LOW 20 MIN: CPT | Performed by: STUDENT IN AN ORGANIZED HEALTH CARE EDUCATION/TRAINING PROGRAM

## 2025-06-19 RX ORDER — TRIAMCINOLONE ACETONIDE 40 MG/ML
40 INJECTION, SUSPENSION INTRA-ARTICULAR; INTRAMUSCULAR
Status: COMPLETED | OUTPATIENT
Start: 2025-06-19 | End: 2025-06-19

## 2025-06-19 RX ORDER — BUPIVACAINE HYDROCHLORIDE 2.5 MG/ML
4 INJECTION, SOLUTION INFILTRATION; PERINEURAL
Status: COMPLETED | OUTPATIENT
Start: 2025-06-19 | End: 2025-06-19

## 2025-06-19 RX ORDER — TRAMADOL HYDROCHLORIDE 50 MG/1
TABLET ORAL
COMMUNITY
Start: 2025-04-17

## 2025-06-19 RX ORDER — ACETAMINOPHEN 325 MG/1
TABLET ORAL
COMMUNITY
Start: 2025-04-30

## 2025-06-19 RX ADMIN — BUPIVACAINE HYDROCHLORIDE 4 ML: 2.5 INJECTION, SOLUTION INFILTRATION; PERINEURAL at 10:30

## 2025-06-19 RX ADMIN — TRIAMCINOLONE ACETONIDE 40 MG: 40 INJECTION, SUSPENSION INTRA-ARTICULAR; INTRAMUSCULAR at 10:30

## 2025-06-19 NOTE — PROGRESS NOTES
"[facility]  [address]  [facility] [address]  [facility] [id number] [phone]       Aide Yanes  [id number]  11/5/1934    ORTHOPAEDIC SURGERY OUTPATIENT NOTE  6/19/2025    :  Assessment & Plan  Primary osteoarthritis of left knee  Discussed that pain and inflammation is due to osteoarthritic flare  Discussed that not much fluid in the knee, discussed fluid aspiration with corticosteroid injection   Fluid aspiration yielded 0 cc fluid, corticosteroid injection given without complication and was well tolerated  OTC pain medication as needed  Discussed that injection can be repeated every 3 months           Large joint arthrocentesis: L knee    Performed by: Markus Cannon DO  Authorized by: Markus Cannon DO    Universal Protocol:  Consent: Verbal consent obtained. Written consent not obtained  Risks and benefits: risks, benefits and alternatives were discussed  Consent given by: patient  Time out: Immediately prior to procedure a \"time out\" was called to verify the correct patient, procedure, equipment, support staff and site/side marked as required.  Timeout called at: 6/19/2025 10:55 AM.  Patient understanding: patient states understanding of the procedure being performed  Relevant documents: relevant documents present and verified  Test results: test results available and properly labeled  Site marked: the operative site was marked  Radiology Images displayed and confirmed. If images not available, report reviewed: imaging studies available  Patient identity confirmed: verbally with patient, provided demographic data and hospital-assigned identification number  Supporting Documentation  Indications: pain and joint swelling     Is this a Visco injection? NoProcedure Details  Location: knee - L knee  Preparation: Patient was prepped and draped in the usual sterile fashion  Needle size: 18 G  Ultrasound guidance: no  Approach: " "anterolateral  Medications administered: 4 mL bupivacaine 0.25 %; 40 mg triamcinolone acetonide 40 mg/mL    Aspirate amount: 0 mL  Patient tolerance: patient tolerated the procedure well with no immediate complications  Dressing:  Sterile dressing applied             Translation: No    HISTORY:  89 y.o. female  who is present in office for follow up, she notes that the last injection helped until 3 weeks ago and noticed increased swelling without injury. Her pain is a 4/10 dull ache. She denies any numbness or tingling. She is inquiring about repeat injection.     Surgical History:  None    Previous Injection(s): 9/12/24- left knee CSI      The following portions of the patient's history were reviewed and updated as appropriate: allergies, current medications, past family history, past social history, past surgical history and problem list.    Ht 5' 4\" (1.626 m)   Wt 50.3 kg (111 lb)   BMI 19.05 kg/m²    No results found for: \"HGBA1C\"      Past Medical History[1]    Past Surgical History[2]    Social History     Socioeconomic History    Marital status:      Spouse name: Not on file    Number of children: Not on file    Years of education: Not on file    Highest education level: Not on file   Occupational History    Not on file   Tobacco Use    Smoking status: Never    Smokeless tobacco: Never   Vaping Use    Vaping status: Never Used   Substance and Sexual Activity    Alcohol use: Not Currently    Drug use: Never    Sexual activity: Not Currently   Other Topics Concern    Not on file   Social History Narrative    Not on file     Social Drivers of Health     Financial Resource Strain: Not on file   Food Insecurity: No Food Insecurity (4/18/2025)    Nursing - Inadequate Food Risk Classification     Worried About Running Out of Food in the Last Year: Not on file     Ran Out of Food in the Last Year: Not on file     Ran Out of Food in the Last Year: Never true   Transportation Needs: No Transportation Needs " (4/18/2025)    Nursing - Transportation Risk Classification     Lack of Transportation: Not on file     Lack of Transportation: No   Physical Activity: Not on file   Stress: Not on file   Social Connections: Not on file   Intimate Partner Violence: Unknown (4/18/2025)    Nursing IPS     Feels Physically and Emotionally Safe: Not on file     Physically Hurt by Someone: Not on file     Humiliated or Emotionally Abused by Someone: Not on file     Physically Hurt by Someone: No     Hurt or Threatened by Someone: No   Housing Stability: Unknown (4/18/2025)    Nursing: Inadequate Housing Risk Classification     Has Housing: Not on file     Worried About Losing Housing: Not on file     Unable to Get Utilities: Not on file     Unable to Pay for Housing in the Last Year: No     Has Housing: No       Family History[3]     Patient's Medications   New Prescriptions    No medications on file   Previous Medications    ACETAMINOPHEN (TYLENOL) 325 MG TABLET    Every 6 hours    LEVOTHYROXINE 25 MCG TABLET    Take 25 mcg by mouth in the morning.    LIDOCAINE (LIDODERM) 5 %    Apply 1 patch topically over 12 hours daily Remove & Discard patch within 12 hours or as directed by MD    MULTIPLE VITAMINS-MINERALS (CENTRUM SILVER PO)    Take by mouth    POLYETHYLENE GLYCOL (MIRALAX) 17 G PACKET    Take 17 g by mouth daily as needed (constipation)    SENNA (SENOKOT) 8.6 MG    Take 1 tablet (8.6 mg total) by mouth daily at bedtime    TRAMADOL (ULTRAM) 50 MG TABLET    TAKE ONE TABLET BY MOUTH TWICE A DAY AS NEEDED FOR PAIN  SCALE SCORE 7 TO 10 OUT OF 10)   Modified Medications    No medications on file   Discontinued Medications    TURMERIC (QC TUMERIC COMPLEX PO)    Take by mouth       Allergies[4]       REVIEW OF SYSTEMS:  Constitutional: Negative.    HEENT: Negative.    Respiratory: Negative.    Skin: Negative.    Neurological: Negative.    Psychiatric/Behavioral: Negative.  Musculoskeletal: Negative except for that mentioned in the  "HPI.    Gen: No acute distress, resting comfortably in bed  HEENT: Eyes clear, moist mucus membranes, hearing intact  Respiratory: No audible wheezing or stridor  Cardiovascular: Well Perfused peripherally, 2+ distal pulse  Abdomen: nondistended, no peritoneal signs     PHYSICAL EXAM:      Left Knee Exam  Inspection:  Moderate knee swelling  Palpation:  +TTP lateral joint line,-medial joint line tenderness  ROM:  5 degrees shy of full extension, 95 degrees flexion  Strength:  Not tested.  Stability:  (-) Varus instability. (-) Valgus instability. (-) Lachman. (-) Posterior drawer.  Tests:  No pertinent positive or negative tests.  Patella:  (-) J-sign. (-) Patellar apprehension. (-) Patellar tilt. Patellar crepitus  Neurovascular:  Sensation intact in Ax/R/M/U nerve distributions.  2+ radial pulse.          Agustina Gutierrez PA-C    Scribe Attestation      I,:   am acting as a scribe while in the presence of the attending physician.:       I,:   personally performed the services described in this documentation    as scribed in my presence.:               Portions of the record may have been created with voice recognition software.  Occasional wrong word or \"sound a like\" substitutions may have occurred due to the inherent limitations of voice recognition software.  Read the chart carefully and recognize, using context, where substitutions have occurred. All patient's questions were answered to their satisfaction.           [1]   Past Medical History:  Diagnosis Date    Anxiety     Disease of thyroid gland    [2]   Past Surgical History:  Procedure Laterality Date    AUGMENTATION BREAST       SECTION      MASTECTOMY      SHOULDER SURGERY     [3]   Family History  Problem Relation Name Age of Onset    Breast cancer Mother          after menopuse  b/l mastectomy  pt states  3 sisters    Breast cancer Daughter      Ovarian cancer Neg Hx     [4]   Allergies  Allergen Reactions    Hylan G-F 20 Other (See Comments)    " Influenza Virus Vaccine Other (See Comments)